# Patient Record
Sex: MALE | Race: WHITE | NOT HISPANIC OR LATINO | Employment: FULL TIME | URBAN - METROPOLITAN AREA
[De-identification: names, ages, dates, MRNs, and addresses within clinical notes are randomized per-mention and may not be internally consistent; named-entity substitution may affect disease eponyms.]

---

## 2017-03-13 ENCOUNTER — HISTORICAL (OUTPATIENT)
Dept: LAB | Facility: HOSPITAL | Age: 52
End: 2017-03-13

## 2017-09-05 ENCOUNTER — HISTORICAL (OUTPATIENT)
Dept: LAB | Facility: HOSPITAL | Age: 52
End: 2017-09-05

## 2017-09-05 LAB
ABS NEUT (OLG): 5.89
BASOPHILS # BLD AUTO: 0.04 X10(3)/MCL
BASOPHILS NFR BLD AUTO: 0.5 %
EOSINOPHIL # BLD AUTO: 0.07 X10(3)/MCL
EOSINOPHIL NFR BLD AUTO: 0.8 %
ERYTHROCYTE [DISTWIDTH] IN BLOOD BY AUTOMATED COUNT: 16 %
HCT VFR BLD AUTO: 41.9 % (ref 39–49)
HGB BLD-MCNC: 13.3 GM/DL (ref 12.6–16.6)
IMM GRANULOCYTES # BLD AUTO: 0.04 10*3/UL (ref 0–0.1)
IMM GRANULOCYTES NFR BLD AUTO: 0.5 % (ref 0–1)
LYMPHOCYTES # BLD AUTO: 1.62 X10(3)/MCL
LYMPHOCYTES NFR BLD AUTO: 19.3 %
MCH RBC QN AUTO: 24.7 PG (ref 27–33)
MCHC RBC AUTO-ENTMCNC: 31.7 GM/DL (ref 32–35)
MCV RBC AUTO: 77.7 FL (ref 84–97)
MONOCYTES # BLD AUTO: 0.73 X10(3)/MCL
MONOCYTES NFR BLD AUTO: 8.7 %
NEUTROPHILS # BLD AUTO: 5.89 X10(3)/MCL
NEUTROPHILS NFR BLD AUTO: 70.2 %
PLATELET # BLD AUTO: 228 X10(3)/MCL (ref 151–368)
PMV BLD AUTO: 10 FL
PSA SERPL-MCNC: 1.97 NG/ML (ref 0–4)
RBC # BLD AUTO: 5.39 X10(6)/MCL (ref 4.3–5.6)
TESTOST SERPL-MCNC: 3194.5 NG/DL (ref 241–827)
WBC # SPEC AUTO: 8.39 X10(3)/MCL (ref 3.4–9.2)

## 2017-09-26 ENCOUNTER — HISTORICAL (OUTPATIENT)
Dept: LAB | Facility: HOSPITAL | Age: 52
End: 2017-09-26

## 2017-09-26 LAB — TESTOST SERPL-MCNC: 127 NG/DL (ref 241–827)

## 2018-01-22 ENCOUNTER — HISTORICAL (OUTPATIENT)
Dept: PREADMISSION TESTING | Facility: HOSPITAL | Age: 53
End: 2018-01-22

## 2018-03-15 ENCOUNTER — HISTORICAL (OUTPATIENT)
Dept: LAB | Facility: HOSPITAL | Age: 53
End: 2018-03-15

## 2018-03-15 LAB
EST. AVERAGE GLUCOSE BLD GHB EST-MCNC: 114 MG/DL
HBA1C MFR BLD: 5.6 % (ref 4.5–6.2)
HCT VFR BLD AUTO: 41.8 % (ref 39–49)
HGB BLD-MCNC: 13.3 GM/DL (ref 12.6–16.6)
PSA SERPL-MCNC: 2.47 NG/ML (ref 0–4)
TESTOST SERPL-MCNC: 1275.1 NG/DL (ref 241–827)

## 2018-05-14 ENCOUNTER — CLINICAL SUPPORT (OUTPATIENT)
Dept: INTERNAL MEDICINE | Facility: CLINIC | Age: 53
End: 2018-05-14

## 2018-05-14 VITALS — HEIGHT: 70 IN

## 2018-05-14 DIAGNOSIS — Z00.00 PHYSICAL EXAM: Primary | ICD-10-CM

## 2018-05-14 PROCEDURE — 99201 PR OFFICE/OUTPT VISIT,NEW,LEVL I: CPT | Mod: ,,, | Performed by: INTERNAL MEDICINE

## 2018-05-14 NOTE — PROGRESS NOTES
Rinku Herzog has presented today on behalf of [employer]. Rinku has completed a Routine or Annual (5 year) Physical Exam. Rinku has also completed Non-DOT Physical.     Yuan Davis

## 2018-10-22 ENCOUNTER — HISTORICAL (OUTPATIENT)
Dept: LAB | Facility: HOSPITAL | Age: 53
End: 2018-10-22

## 2018-10-22 LAB
ABS NEUT (OLG): 4.52
ALBUMIN SERPL-MCNC: 3.9 GM/DL (ref 3.4–5)
ALBUMIN/GLOB SERPL: 1.2 RATIO (ref 1.1–2)
ALP SERPL-CCNC: 56 UNIT/L (ref 46–116)
ALT SERPL-CCNC: 57 UNIT/L (ref 12–78)
AST SERPL-CCNC: 25 UNIT/L (ref 10–37)
BASOPHILS # BLD AUTO: 0.04 X10(3)/MCL
BASOPHILS NFR BLD AUTO: 0.6 %
BILIRUB SERPL-MCNC: 0.7 MG/DL (ref 0.2–1)
BILIRUBIN DIRECT+TOT PNL SERPL-MCNC: 0.15 MG/DL (ref 0–0.2)
BILIRUBIN DIRECT+TOT PNL SERPL-MCNC: 0.55 MG/DL
BUN SERPL-MCNC: 20 MG/DL (ref 7–18)
CALCIUM SERPL-MCNC: 8.8 MG/DL (ref 8.5–10.1)
CHLORIDE SERPL-SCNC: 103 MMOL/L (ref 98–107)
CHOLEST SERPL-MCNC: 163 MG/DL (ref 50–200)
CHOLEST/HDLC SERPL: 6 {RATIO} (ref 0–5)
CO2 SERPL-SCNC: 25.5 MMOL/L (ref 21–32)
CREAT SERPL-MCNC: 1.13 MG/DL (ref 0.7–1.3)
EOSINOPHIL # BLD AUTO: 0.11 X10(3)/MCL
EOSINOPHIL NFR BLD AUTO: 1.6 %
ERYTHROCYTE [DISTWIDTH] IN BLOOD BY AUTOMATED COUNT: 17 %
GLOBULIN SER-MCNC: 3.2 GM/DL (ref 2.4–3.5)
GLUCOSE SERPL-MCNC: 99 MG/DL (ref 74–106)
HCT VFR BLD AUTO: 45.9 % (ref 39–49)
HDLC SERPL-MCNC: 28 MG/DL (ref 35–60)
HGB BLD-MCNC: 14.3 GM/DL (ref 12.6–16.6)
IMM GRANULOCYTES # BLD AUTO: 0.06 10*3/UL (ref 0–0.1)
IMM GRANULOCYTES NFR BLD AUTO: 0.9 % (ref 0–1)
LDLC SERPL CALC-MCNC: 97 MG/DL (ref 50–140)
LYMPHOCYTES # BLD AUTO: 1.55 X10(3)/MCL
LYMPHOCYTES NFR BLD AUTO: 22.3 %
MCH RBC QN AUTO: 24.2 PG (ref 27–33)
MCHC RBC AUTO-ENTMCNC: 31.2 GM/DL (ref 32–35)
MCV RBC AUTO: 77.8 FL (ref 84–97)
MONOCYTES # BLD AUTO: 0.68 X10(3)/MCL
MONOCYTES NFR BLD AUTO: 9.8 %
NEUTROPHILS # BLD AUTO: 4.52 X10(3)/MCL
NEUTROPHILS NFR BLD AUTO: 64.8 %
PLATELET # BLD AUTO: 214 X10(3)/MCL (ref 151–368)
PMV BLD AUTO: 10 FL
POTASSIUM SERPL-SCNC: 3.9 MMOL/L (ref 3.5–5.1)
PROT SERPL-MCNC: 7.1 GM/DL (ref 6.4–8.2)
PSA SERPL-MCNC: 2.55 NG/ML (ref 0–4)
RBC # BLD AUTO: 5.9 X10(6)/MCL (ref 4.3–5.6)
SODIUM SERPL-SCNC: 139 MMOL/L (ref 136–145)
TESTOST SERPL-MCNC: 772 NG/DL (ref 241–827)
TRIGL SERPL-MCNC: 190 MG/DL (ref 30–150)
VLDLC SERPL CALC-MCNC: 38 MG/DL
WBC # SPEC AUTO: 6.96 X10(3)/MCL (ref 3.4–9.2)

## 2019-02-08 ENCOUNTER — HISTORICAL (OUTPATIENT)
Dept: LAB | Facility: HOSPITAL | Age: 54
End: 2019-02-08

## 2019-02-08 LAB
ABS NEUT (OLG): 4.52
ALBUMIN SERPL-MCNC: 3.7 GM/DL (ref 3.4–5)
ALBUMIN/GLOB SERPL: 1.2 RATIO (ref 1.1–2)
ALP SERPL-CCNC: 60 UNIT/L (ref 46–116)
ALT SERPL-CCNC: 52 UNIT/L (ref 12–78)
APTT PPP: 24.9 SECOND(S) (ref 24.5–32.8)
AST SERPL-CCNC: 22 UNIT/L (ref 10–37)
BASOPHILS # BLD AUTO: 0.02 X10(3)/MCL
BASOPHILS NFR BLD AUTO: 0.3 %
BILIRUB SERPL-MCNC: 0.6 MG/DL (ref 0.2–1)
BILIRUBIN DIRECT+TOT PNL SERPL-MCNC: 0.16 MG/DL (ref 0–0.2)
BILIRUBIN DIRECT+TOT PNL SERPL-MCNC: 0.44 MG/DL
BUN SERPL-MCNC: 14 MG/DL (ref 7–18)
CALCIUM SERPL-MCNC: 8.9 MG/DL (ref 8.5–10.1)
CHLORIDE SERPL-SCNC: 107 MMOL/L (ref 98–107)
CO2 SERPL-SCNC: 30.3 MMOL/L (ref 21–32)
CREAT SERPL-MCNC: 1.25 MG/DL (ref 0.7–1.3)
EOSINOPHIL # BLD AUTO: 0.07 X10(3)/MCL
EOSINOPHIL NFR BLD AUTO: 1 %
ERYTHROCYTE [DISTWIDTH] IN BLOOD BY AUTOMATED COUNT: 19 %
GLOBULIN SER-MCNC: 3.1 GM/DL (ref 2.4–3.5)
GLUCOSE SERPL-MCNC: 100 MG/DL (ref 74–106)
HCT VFR BLD AUTO: 48.9 % (ref 39–49)
HGB BLD-MCNC: 15.2 GM/DL (ref 12.6–16.6)
IMM GRANULOCYTES # BLD AUTO: 0.02 10*3/UL (ref 0–0.1)
IMM GRANULOCYTES NFR BLD AUTO: 0.3 % (ref 0–1)
INR PPP: 1.1
LYMPHOCYTES # BLD AUTO: 1.57 X10(3)/MCL
LYMPHOCYTES NFR BLD AUTO: 23.4 %
MCH RBC QN AUTO: 25.6 PG (ref 27–33)
MCHC RBC AUTO-ENTMCNC: 31.1 GM/DL (ref 32–35)
MCV RBC AUTO: 82.5 FL (ref 84–97)
MONOCYTES # BLD AUTO: 0.5 X10(3)/MCL
MONOCYTES NFR BLD AUTO: 7.5 %
NEUTROPHILS # BLD AUTO: 4.52 X10(3)/MCL
NEUTROPHILS NFR BLD AUTO: 67.5 %
PLATELET # BLD AUTO: 214 X10(3)/MCL (ref 151–368)
PMV BLD AUTO: 10 FL
POTASSIUM SERPL-SCNC: 4.1 MMOL/L (ref 3.5–5.1)
PROT SERPL-MCNC: 6.8 GM/DL (ref 6.4–8.2)
PROTHROMBIN TIME: 10.3 SECOND(S) (ref 8.6–10.1)
RBC # BLD AUTO: 5.93 X10(6)/MCL (ref 4.3–5.6)
SODIUM SERPL-SCNC: 144 MMOL/L (ref 136–145)
WBC # SPEC AUTO: 6.7 X10(3)/MCL (ref 3.4–9.2)

## 2019-02-12 ENCOUNTER — HISTORICAL (OUTPATIENT)
Dept: LAB | Facility: HOSPITAL | Age: 54
End: 2019-02-12

## 2019-02-13 ENCOUNTER — HISTORICAL (OUTPATIENT)
Dept: MEDSURG UNIT | Facility: HOSPITAL | Age: 54
End: 2019-02-13

## 2019-03-12 ENCOUNTER — HISTORICAL (OUTPATIENT)
Dept: RADIOLOGY | Facility: HOSPITAL | Age: 54
End: 2019-03-12

## 2019-04-02 ENCOUNTER — HISTORICAL (OUTPATIENT)
Dept: LAB | Facility: HOSPITAL | Age: 54
End: 2019-04-02

## 2019-04-02 LAB
BUN SERPL-MCNC: 21 MG/DL (ref 7–18)
CALCIUM SERPL-MCNC: 8.4 MG/DL (ref 8.5–10.1)
CHLORIDE SERPL-SCNC: 105 MMOL/L (ref 98–107)
CO2 SERPL-SCNC: 28.6 MMOL/L (ref 21–32)
CREAT SERPL-MCNC: 1.45 MG/DL (ref 0.7–1.3)
CREAT/UREA NIT SERPL: 14
GLUCOSE SERPL-MCNC: 88 MG/DL (ref 74–106)
POTASSIUM SERPL-SCNC: 4 MMOL/L (ref 3.5–5.1)
SODIUM SERPL-SCNC: 140 MMOL/L (ref 136–145)

## 2019-04-24 ENCOUNTER — HISTORICAL (OUTPATIENT)
Dept: LAB | Facility: HOSPITAL | Age: 54
End: 2019-04-24

## 2019-04-24 LAB
ABS NEUT (OLG): 5.07 X10(3)/MCL (ref 2.1–9.2)
ALBUMIN SERPL-MCNC: 4 GM/DL (ref 3.4–5)
ALBUMIN/GLOB SERPL: 1.3 {RATIO}
ALP SERPL-CCNC: 75 UNIT/L (ref 50–136)
ALT SERPL-CCNC: 65 UNIT/L (ref 12–78)
AST SERPL-CCNC: 26 UNIT/L (ref 15–37)
BASOPHILS # BLD AUTO: 0 X10(3)/MCL (ref 0–0.2)
BASOPHILS NFR BLD AUTO: 0 %
BILIRUB SERPL-MCNC: 0.9 MG/DL (ref 0.2–1)
BILIRUBIN DIRECT+TOT PNL SERPL-MCNC: 0.2 MG/DL (ref 0–0.2)
BILIRUBIN DIRECT+TOT PNL SERPL-MCNC: 0.7 MG/DL (ref 0–0.8)
BUN SERPL-MCNC: 17 MG/DL (ref 7–18)
CALCIUM SERPL-MCNC: 8.6 MG/DL (ref 8.5–10.1)
CHLORIDE SERPL-SCNC: 104 MMOL/L (ref 98–107)
CO2 SERPL-SCNC: 27 MMOL/L (ref 21–32)
CREAT SERPL-MCNC: 1.32 MG/DL (ref 0.7–1.3)
EOSINOPHIL # BLD AUTO: 0.1 X10(3)/MCL (ref 0–0.9)
EOSINOPHIL NFR BLD AUTO: 1 %
ERYTHROCYTE [DISTWIDTH] IN BLOOD BY AUTOMATED COUNT: 16.4 % (ref 11.5–17)
GLOBULIN SER-MCNC: 3 GM/DL (ref 2.4–3.5)
GLUCOSE SERPL-MCNC: 85 MG/DL (ref 74–106)
HCT VFR BLD AUTO: 52.1 % (ref 42–52)
HGB BLD-MCNC: 16 GM/DL (ref 14–18)
LYMPHOCYTES # BLD AUTO: 1.3 X10(3)/MCL (ref 0.6–4.6)
LYMPHOCYTES NFR BLD AUTO: 18 %
MCH RBC QN AUTO: 26.2 PG (ref 27–31)
MCHC RBC AUTO-ENTMCNC: 30.7 GM/DL (ref 33–36)
MCV RBC AUTO: 85.4 FL (ref 80–94)
MONOCYTES # BLD AUTO: 0.6 X10(3)/MCL (ref 0.1–1.3)
MONOCYTES NFR BLD AUTO: 8 %
NEUTROPHILS # BLD AUTO: 5.07 X10(3)/MCL (ref 2.1–9.2)
NEUTROPHILS NFR BLD AUTO: 72 %
PLATELET # BLD AUTO: 199 X10(3)/MCL (ref 130–400)
PMV BLD AUTO: 10.6 FL (ref 9.4–12.4)
POTASSIUM SERPL-SCNC: 4 MMOL/L (ref 3.5–5.1)
PROT SERPL-MCNC: 7 GM/DL (ref 6.4–8.2)
RBC # BLD AUTO: 6.1 X10(6)/MCL (ref 4.7–6.1)
SODIUM SERPL-SCNC: 137 MMOL/L (ref 136–145)
TSH SERPL-ACNC: 2.61 MIU/L (ref 0.36–3.74)
WBC # SPEC AUTO: 7.1 X10(3)/MCL (ref 4.5–11.5)

## 2019-07-05 ENCOUNTER — HISTORICAL (OUTPATIENT)
Dept: LAB | Facility: HOSPITAL | Age: 54
End: 2019-07-05

## 2019-07-05 LAB
ABS NEUT (OLG): 3.48
ALBUMIN SERPL-MCNC: 3.7 GM/DL (ref 3.4–5)
ALBUMIN/GLOB SERPL: 1.2 RATIO (ref 1.1–2)
ALP SERPL-CCNC: 73 UNIT/L (ref 46–116)
ALT SERPL-CCNC: 47 UNIT/L (ref 12–78)
AST SERPL-CCNC: 16 UNIT/L (ref 10–37)
BASOPHILS # BLD AUTO: 0.01 X10(3)/MCL
BASOPHILS NFR BLD AUTO: 0.2 %
BILIRUB SERPL-MCNC: 0.4 MG/DL (ref 0.2–1)
BILIRUBIN DIRECT+TOT PNL SERPL-MCNC: 0.11 MG/DL (ref 0–0.2)
BILIRUBIN DIRECT+TOT PNL SERPL-MCNC: 0.29 MG/DL
BUN SERPL-MCNC: 19 MG/DL (ref 7–18)
CALCIUM SERPL-MCNC: 8.7 MG/DL (ref 8.5–10.1)
CHLORIDE SERPL-SCNC: 106 MMOL/L (ref 98–107)
CO2 SERPL-SCNC: 26 MMOL/L (ref 21–32)
CREAT SERPL-MCNC: 1.15 MG/DL (ref 0.7–1.3)
EOSINOPHIL # BLD AUTO: 0.12 X10(3)/MCL
EOSINOPHIL NFR BLD AUTO: 2.1 %
ERYTHROCYTE [DISTWIDTH] IN BLOOD BY AUTOMATED COUNT: 16 %
EST. AVERAGE GLUCOSE BLD GHB EST-MCNC: 114 MG/DL
GLOBULIN SER-MCNC: 3 GM/DL (ref 2.4–3.5)
GLUCOSE SERPL-MCNC: 97 MG/DL (ref 74–106)
HBA1C MFR BLD: 5.6 % (ref 4.5–6.2)
HCT VFR BLD AUTO: 45.1 % (ref 39–49)
HGB BLD-MCNC: 15.2 GM/DL (ref 12.6–16.6)
IMM GRANULOCYTES # BLD AUTO: 0.06 10*3/UL (ref 0–0.1)
IMM GRANULOCYTES NFR BLD AUTO: 1 % (ref 0–1)
LYMPHOCYTES # BLD AUTO: 1.51 X10(3)/MCL
LYMPHOCYTES NFR BLD AUTO: 26.4 %
MCH RBC QN AUTO: 29.2 PG (ref 27–33)
MCHC RBC AUTO-ENTMCNC: 33.7 GM/DL (ref 32–35)
MCV RBC AUTO: 86.6 FL (ref 84–97)
MONOCYTES # BLD AUTO: 0.55 X10(3)/MCL
MONOCYTES NFR BLD AUTO: 9.6 %
NEUTROPHILS # BLD AUTO: 3.48 X10(3)/MCL
NEUTROPHILS NFR BLD AUTO: 60.7 %
PLATELET # BLD AUTO: 161 X10(3)/MCL (ref 140–450)
PMV BLD AUTO: 10 FL
POTASSIUM SERPL-SCNC: 3.9 MMOL/L (ref 3.5–5.1)
PROT SERPL-MCNC: 6.7 GM/DL (ref 6.4–8.2)
PSA SERPL-MCNC: 2.24 NG/ML (ref 0–4)
RBC # BLD AUTO: 5.21 X10(6)/MCL (ref 4.3–5.6)
SODIUM SERPL-SCNC: 142 MMOL/L (ref 136–145)
TESTOST SERPL-MCNC: 372 NG/DL (ref 241–827)
WBC # SPEC AUTO: 5.73 X10(3)/MCL (ref 3.4–9.2)

## 2019-08-02 ENCOUNTER — HISTORICAL (OUTPATIENT)
Dept: LAB | Facility: HOSPITAL | Age: 54
End: 2019-08-02

## 2019-08-02 LAB
ABS NEUT (OLG): 5.19
ALBUMIN SERPL-MCNC: 4.1 GM/DL (ref 3.4–5)
ALBUMIN/GLOB SERPL: 1.2 RATIO (ref 1.1–2)
ALP SERPL-CCNC: 77 UNIT/L (ref 46–116)
ALT SERPL-CCNC: 35 UNIT/L (ref 12–78)
AST SERPL-CCNC: 18 UNIT/L (ref 10–37)
BASOPHILS # BLD AUTO: 0.02 X10(3)/MCL
BASOPHILS NFR BLD AUTO: 0.3 %
BILIRUB SERPL-MCNC: 0.7 MG/DL (ref 0.2–1)
BILIRUBIN DIRECT+TOT PNL SERPL-MCNC: 0.19 MG/DL (ref 0–0.2)
BILIRUBIN DIRECT+TOT PNL SERPL-MCNC: 0.51 MG/DL
BUN SERPL-MCNC: 11 MG/DL (ref 7–18)
CALCIUM SERPL-MCNC: 9.3 MG/DL (ref 8.5–10.1)
CHLORIDE SERPL-SCNC: 103 MMOL/L (ref 98–107)
CO2 SERPL-SCNC: 28.3 MMOL/L (ref 21–32)
CREAT SERPL-MCNC: 1.12 MG/DL (ref 0.7–1.3)
EOSINOPHIL # BLD AUTO: 0.04 X10(3)/MCL
EOSINOPHIL NFR BLD AUTO: 0.5 %
ERYTHROCYTE [DISTWIDTH] IN BLOOD BY AUTOMATED COUNT: 15 %
GLOBULIN SER-MCNC: 3.3 GM/DL (ref 2.4–3.5)
GLUCOSE SERPL-MCNC: 100 MG/DL (ref 74–106)
HCT VFR BLD AUTO: 47 % (ref 39–49)
HGB BLD-MCNC: 16.7 GM/DL (ref 12.6–16.6)
IMM GRANULOCYTES # BLD AUTO: 0.03 10*3/UL (ref 0–0.1)
IMM GRANULOCYTES NFR BLD AUTO: 0.4 % (ref 0–1)
LYMPHOCYTES # BLD AUTO: 1.81 X10(3)/MCL
LYMPHOCYTES NFR BLD AUTO: 22.9 %
MCH RBC QN AUTO: 30.5 PG (ref 27–33)
MCHC RBC AUTO-ENTMCNC: 35.5 GM/DL (ref 32–35)
MCV RBC AUTO: 85.8 FL (ref 84–97)
MONOCYTES # BLD AUTO: 0.82 X10(3)/MCL
MONOCYTES NFR BLD AUTO: 10.4 %
NEUTROPHILS # BLD AUTO: 5.19 X10(3)/MCL
NEUTROPHILS NFR BLD AUTO: 65.5 %
PLATELET # BLD AUTO: 208 X10(3)/MCL (ref 140–450)
PMV BLD AUTO: 10 FL
POTASSIUM SERPL-SCNC: 3.8 MMOL/L (ref 3.5–5.1)
PROT SERPL-MCNC: 7.4 GM/DL (ref 6.4–8.2)
RBC # BLD AUTO: 5.48 X10(6)/MCL (ref 4.3–5.6)
SODIUM SERPL-SCNC: 139 MMOL/L (ref 136–145)
WBC # SPEC AUTO: 7.91 X10(3)/MCL (ref 3.4–9.2)

## 2019-08-20 ENCOUNTER — HISTORICAL (OUTPATIENT)
Dept: OCCUPATIONAL THERAPY | Facility: HOSPITAL | Age: 54
End: 2019-08-20

## 2019-08-21 ENCOUNTER — HISTORICAL (OUTPATIENT)
Dept: LAB | Facility: HOSPITAL | Age: 54
End: 2019-08-21

## 2019-08-21 LAB
T4 FREE SERPL-MCNC: 1.11 NG/DL (ref 0.76–1.46)
TSH SERPL-ACNC: 2.33 MIU/ML (ref 0.35–3.75)

## 2019-08-22 ENCOUNTER — HISTORICAL (OUTPATIENT)
Dept: INTENSIVE CARE | Facility: HOSPITAL | Age: 54
End: 2019-08-22

## 2019-08-23 ENCOUNTER — HISTORICAL (OUTPATIENT)
Dept: OCCUPATIONAL THERAPY | Facility: HOSPITAL | Age: 54
End: 2019-08-23

## 2019-08-26 ENCOUNTER — HISTORICAL (OUTPATIENT)
Dept: OCCUPATIONAL THERAPY | Facility: HOSPITAL | Age: 54
End: 2019-08-26

## 2019-08-28 ENCOUNTER — HISTORICAL (OUTPATIENT)
Dept: OCCUPATIONAL THERAPY | Facility: HOSPITAL | Age: 54
End: 2019-08-28

## 2019-09-03 ENCOUNTER — HISTORICAL (OUTPATIENT)
Dept: OCCUPATIONAL THERAPY | Facility: HOSPITAL | Age: 54
End: 2019-09-03

## 2019-09-06 ENCOUNTER — HISTORICAL (OUTPATIENT)
Dept: OCCUPATIONAL THERAPY | Facility: HOSPITAL | Age: 54
End: 2019-09-06

## 2019-09-09 ENCOUNTER — HISTORICAL (OUTPATIENT)
Dept: OCCUPATIONAL THERAPY | Facility: HOSPITAL | Age: 54
End: 2019-09-09

## 2019-09-11 ENCOUNTER — HISTORICAL (OUTPATIENT)
Dept: OCCUPATIONAL THERAPY | Facility: HOSPITAL | Age: 54
End: 2019-09-11

## 2019-09-13 ENCOUNTER — HISTORICAL (OUTPATIENT)
Dept: OCCUPATIONAL THERAPY | Facility: HOSPITAL | Age: 54
End: 2019-09-13

## 2019-09-18 ENCOUNTER — HISTORICAL (OUTPATIENT)
Dept: OCCUPATIONAL THERAPY | Facility: HOSPITAL | Age: 54
End: 2019-09-18

## 2019-10-03 ENCOUNTER — HISTORICAL (OUTPATIENT)
Dept: OCCUPATIONAL THERAPY | Facility: HOSPITAL | Age: 54
End: 2019-10-03

## 2019-10-09 ENCOUNTER — HISTORICAL (OUTPATIENT)
Dept: OCCUPATIONAL THERAPY | Facility: HOSPITAL | Age: 54
End: 2019-10-09

## 2019-10-30 ENCOUNTER — HISTORICAL (OUTPATIENT)
Dept: OCCUPATIONAL THERAPY | Facility: HOSPITAL | Age: 54
End: 2019-10-30

## 2019-11-05 ENCOUNTER — HISTORICAL (OUTPATIENT)
Dept: OCCUPATIONAL THERAPY | Facility: HOSPITAL | Age: 54
End: 2019-11-05

## 2019-11-07 ENCOUNTER — HISTORICAL (OUTPATIENT)
Dept: OCCUPATIONAL THERAPY | Facility: HOSPITAL | Age: 54
End: 2019-11-07

## 2019-11-13 ENCOUNTER — HISTORICAL (OUTPATIENT)
Dept: OCCUPATIONAL THERAPY | Facility: HOSPITAL | Age: 54
End: 2019-11-13

## 2019-11-21 ENCOUNTER — HISTORICAL (OUTPATIENT)
Dept: OCCUPATIONAL THERAPY | Facility: HOSPITAL | Age: 54
End: 2019-11-21

## 2019-12-02 ENCOUNTER — HISTORICAL (OUTPATIENT)
Dept: OCCUPATIONAL THERAPY | Facility: HOSPITAL | Age: 54
End: 2019-12-02

## 2019-12-04 ENCOUNTER — HISTORICAL (OUTPATIENT)
Dept: OCCUPATIONAL THERAPY | Facility: HOSPITAL | Age: 54
End: 2019-12-04

## 2020-05-21 DIAGNOSIS — Z13.9 ENCOUNTER FOR SCREENING: Primary | ICD-10-CM

## 2021-05-18 ENCOUNTER — HISTORICAL (OUTPATIENT)
Dept: LAB | Facility: HOSPITAL | Age: 56
End: 2021-05-18

## 2021-05-18 LAB
ABS NEUT (OLG): 3.25
ALBUMIN SERPL-MCNC: 4 GM/DL (ref 3.5–5)
ALBUMIN/GLOB SERPL: 1.4 RATIO (ref 1.1–2)
ALP SERPL-CCNC: 68 UNIT/L (ref 40–150)
ALT SERPL-CCNC: 38 UNIT/L (ref 0–55)
AST SERPL-CCNC: 22 UNIT/L (ref 5–34)
BASOPHILS # BLD AUTO: 0.01 X10(3)/MCL
BASOPHILS NFR BLD AUTO: 0.2 %
BILIRUB SERPL-MCNC: 0.6 MG/DL
BILIRUBIN DIRECT+TOT PNL SERPL-MCNC: 0.2 MG/DL (ref 0–0.5)
BILIRUBIN DIRECT+TOT PNL SERPL-MCNC: 0.4 MG/DL
BUN SERPL-MCNC: 20 MG/DL (ref 8.4–25.7)
CALCIUM SERPL-MCNC: 9.2 MG/DL (ref 8.4–10.2)
CHLORIDE SERPL-SCNC: 109 MMOL/L (ref 98–107)
CHOLEST SERPL-MCNC: 151 MG/DL
CHOLEST/HDLC SERPL: 5 {RATIO} (ref 0–5)
CO2 SERPL-SCNC: 26 MEQ/L (ref 22–29)
CREAT SERPL-MCNC: 1.19 MG/DL (ref 0.73–1.18)
EOSINOPHIL # BLD AUTO: 0.08 X10(3)/MCL
EOSINOPHIL NFR BLD AUTO: 1.6 %
ERYTHROCYTE [DISTWIDTH] IN BLOOD BY AUTOMATED COUNT: 13 %
EST. AVERAGE GLUCOSE BLD GHB EST-MCNC: 97 MG/DL
GLOBULIN SER-MCNC: 2.8 GM/DL (ref 2.4–3.5)
GLUCOSE SERPL-MCNC: 104 MG/DL (ref 74–100)
HBA1C MFR BLD: 5 % (ref 4–6)
HCT VFR BLD AUTO: 48.1 % (ref 39–49)
HDLC SERPL-MCNC: 33 MG/DL (ref 35–60)
HGB BLD-MCNC: 15.8 GM/DL (ref 12.6–16.6)
IMM GRANULOCYTES # BLD AUTO: 0.02 10*3/UL (ref 0–0.1)
IMM GRANULOCYTES NFR BLD AUTO: 0.4 % (ref 0–1)
LDLC SERPL CALC-MCNC: 97 MG/DL (ref 50–140)
LYMPHOCYTES # BLD AUTO: 1.39 X10(3)/MCL
LYMPHOCYTES NFR BLD AUTO: 26.9 %
MCH RBC QN AUTO: 30.1 PG (ref 27–33)
MCHC RBC AUTO-ENTMCNC: 32.8 GM/DL (ref 32–35)
MCV RBC AUTO: 91.6 FL (ref 84–97)
MONOCYTES # BLD AUTO: 0.41 X10(3)/MCL
MONOCYTES NFR BLD AUTO: 7.9 %
NEUTROPHILS # BLD AUTO: 3.25 X10(3)/MCL
NEUTROPHILS NFR BLD AUTO: 63 %
PLATELET # BLD AUTO: 166 X10(3)/MCL (ref 140–450)
PMV BLD AUTO: 10 FL
POTASSIUM SERPL-SCNC: 4.2 MMOL/L (ref 3.5–5.1)
PROT SERPL-MCNC: 6.8 GM/DL (ref 6.4–8.3)
PSA SERPL-MCNC: 2.33 NG/ML
RBC # BLD AUTO: 5.25 X10(6)/MCL (ref 4.3–5.6)
SODIUM SERPL-SCNC: 144 MMOL/L (ref 136–145)
TESTOST SERPL-MCNC: 377.76 NG/DL (ref 220.91–715.81)
TRIGL SERPL-MCNC: 105 MG/DL (ref 34–140)
TSH SERPL-ACNC: 1.97 UIU/ML (ref 0.35–4.94)
VLDLC SERPL CALC-MCNC: 21 MG/DL
WBC # SPEC AUTO: 5.16 X10(3)/MCL (ref 3.4–9.2)

## 2021-08-02 ENCOUNTER — HISTORICAL (OUTPATIENT)
Dept: LAB | Facility: HOSPITAL | Age: 56
End: 2021-08-02

## 2021-08-02 LAB — SARS-COV-2 AG RESP QL IA.RAPID: NEGATIVE

## 2021-10-21 ENCOUNTER — HISTORICAL (OUTPATIENT)
Dept: RADIOLOGY | Facility: HOSPITAL | Age: 56
End: 2021-10-21

## 2021-11-26 ENCOUNTER — HISTORICAL (OUTPATIENT)
Dept: LAB | Facility: HOSPITAL | Age: 56
End: 2021-11-26

## 2021-11-26 LAB — SARS-COV-2 RNA RESP QL NAA+PROBE: NOT DETECTED

## 2021-12-29 ENCOUNTER — HISTORICAL (OUTPATIENT)
Dept: LAB | Facility: HOSPITAL | Age: 56
End: 2021-12-29

## 2021-12-29 LAB
FLUAV AG UPPER RESP QL IA.RAPID: NEGATIVE
FLUBV AG UPPER RESP QL IA.RAPID: NEGATIVE
SARS-COV-2 RNA RESP QL NAA+PROBE: DETECTED

## 2021-12-30 ENCOUNTER — HISTORICAL (OUTPATIENT)
Dept: RADIOLOGY | Facility: HOSPITAL | Age: 56
End: 2021-12-30

## 2022-01-05 ENCOUNTER — HISTORICAL (OUTPATIENT)
Dept: RADIOLOGY | Facility: HOSPITAL | Age: 57
End: 2022-01-05

## 2022-01-06 ENCOUNTER — HISTORICAL (OUTPATIENT)
Dept: ADMINISTRATIVE | Facility: HOSPITAL | Age: 57
End: 2022-01-06

## 2022-01-10 ENCOUNTER — HISTORICAL (OUTPATIENT)
Dept: LAB | Facility: HOSPITAL | Age: 57
End: 2022-01-10

## 2022-01-10 LAB
ABS NEUT (OLG): 6.17
ALBUMIN SERPL-MCNC: 3.5 GM/DL (ref 3.5–5)
ALBUMIN/GLOB SERPL: 1 RATIO (ref 1.1–2)
ALP SERPL-CCNC: 78 UNIT/L (ref 40–150)
ALT SERPL-CCNC: 70 UNIT/L (ref 0–55)
AST SERPL-CCNC: 30 UNIT/L (ref 5–34)
BASOPHILS # BLD AUTO: 0.02 X10(3)/MCL
BASOPHILS NFR BLD AUTO: 0.3 %
BILIRUB SERPL-MCNC: 0.9 MG/DL
BILIRUBIN DIRECT+TOT PNL SERPL-MCNC: 0.4 MG/DL (ref 0–0.5)
BILIRUBIN DIRECT+TOT PNL SERPL-MCNC: 0.5 MG/DL
BUN SERPL-MCNC: 12 MG/DL (ref 8.4–25.7)
CALCIUM SERPL-MCNC: 8.7 MG/DL (ref 8.7–10.5)
CHLORIDE SERPL-SCNC: 108 MMOL/L (ref 98–107)
CO2 SERPL-SCNC: 24 MEQ/L (ref 22–29)
CREAT SERPL-MCNC: 1.04 MG/DL (ref 0.73–1.18)
D DIMER PPP IA.FEU-MCNC: 0.45 MCG/ML FEU (ref 0–0.5)
EOSINOPHIL # BLD AUTO: 0.14 X10(3)/MCL
EOSINOPHIL NFR BLD AUTO: 1.8 %
ERYTHROCYTE [DISTWIDTH] IN BLOOD BY AUTOMATED COUNT: 13 %
GLOBULIN SER-MCNC: 3.4 GM/DL (ref 2.4–3.5)
GLUCOSE SERPL-MCNC: 100 MG/DL (ref 74–100)
HCT VFR BLD AUTO: 45.2 % (ref 39–49)
HGB BLD-MCNC: 15.3 GM/DL (ref 12.6–16.6)
IMM GRANULOCYTES # BLD AUTO: 0.04 10*3/UL (ref 0–0.1)
IMM GRANULOCYTES NFR BLD AUTO: 0.5 % (ref 0–1)
LYMPHOCYTES # BLD AUTO: 0.79 X10(3)/MCL
LYMPHOCYTES NFR BLD AUTO: 10 %
MCH RBC QN AUTO: 31.2 PG (ref 27–33)
MCHC RBC AUTO-ENTMCNC: 33.8 GM/DL (ref 32–35)
MCV RBC AUTO: 92.2 FL (ref 84–97)
MONOCYTES # BLD AUTO: 0.76 X10(3)/MCL
MONOCYTES NFR BLD AUTO: 9.6 %
NEUTROPHILS # BLD AUTO: 6.17 X10(3)/MCL
NEUTROPHILS NFR BLD AUTO: 77.8 %
PLATELET # BLD AUTO: 169 X10(3)/MCL (ref 140–450)
PMV BLD AUTO: 10 FL
POTASSIUM SERPL-SCNC: 4.2 MMOL/L (ref 3.5–5.1)
PROT SERPL-MCNC: 6.9 GM/DL (ref 6.4–8.3)
RBC # BLD AUTO: 4.9 X10(6)/MCL (ref 4.3–5.6)
SODIUM SERPL-SCNC: 141 MMOL/L (ref 136–145)
WBC # SPEC AUTO: 7.92 X10(3)/MCL (ref 3.4–9.2)

## 2022-01-18 ENCOUNTER — HISTORICAL (OUTPATIENT)
Dept: RADIOLOGY | Facility: HOSPITAL | Age: 57
End: 2022-01-18

## 2022-01-28 ENCOUNTER — HISTORICAL (OUTPATIENT)
Dept: ADMINISTRATIVE | Facility: HOSPITAL | Age: 57
End: 2022-01-28

## 2022-01-28 ENCOUNTER — HISTORICAL (OUTPATIENT)
Dept: RADIOLOGY | Facility: HOSPITAL | Age: 57
End: 2022-01-28

## 2022-02-21 ENCOUNTER — HISTORICAL (OUTPATIENT)
Dept: RADIOLOGY | Facility: HOSPITAL | Age: 57
End: 2022-02-21

## 2022-02-21 ENCOUNTER — HISTORICAL (OUTPATIENT)
Dept: ADMINISTRATIVE | Facility: HOSPITAL | Age: 57
End: 2022-02-21

## 2022-03-25 ENCOUNTER — HISTORICAL (OUTPATIENT)
Dept: ADMINISTRATIVE | Facility: HOSPITAL | Age: 57
End: 2022-03-25

## 2022-03-25 ENCOUNTER — HISTORICAL (OUTPATIENT)
Dept: RADIOLOGY | Facility: HOSPITAL | Age: 57
End: 2022-03-25

## 2022-04-10 ENCOUNTER — HISTORICAL (OUTPATIENT)
Dept: ADMINISTRATIVE | Facility: HOSPITAL | Age: 57
End: 2022-04-10
Payer: COMMERCIAL

## 2022-04-25 VITALS
WEIGHT: 243 LBS | HEIGHT: 72 IN | BODY MASS INDEX: 32.91 KG/M2 | DIASTOLIC BLOOD PRESSURE: 80 MMHG | SYSTOLIC BLOOD PRESSURE: 130 MMHG

## 2022-04-30 NOTE — H&P
Patient:   Rinku Herzog            MRN: [id number]            FIN: [id number]               Age:   53 years     Sex:  Male     :  1965   Associated Diagnoses:   None   Author:   Jaylon Mishra MD          Chief Complaint COLONOSCOPY   History of Present Illness colon cancer screening   Review of Systems Constitutional: negative  Respiratory: negative  Cardiovascular: negative  Gastrointestinal: negative, no family hx of colon cancer; hx of prior hemorrhoids, recent flare  Genitourinary: negative  Heme/Lymph: negative  Endocrine: negative  Musculoskeletal: negative  Integumentary: negative  Neurologic: negative  Psych: negative    Physical Exam Vitals & Measurements T: 36.3 °C (Tympanic) HR: 70(Peripheral) RR: 18 BP: 110/80   HT: 182 cm WT: 117 kg BMI: 35.32   General: Alert and oriented, no acute distress, C/C/E  HEENT: Tympanic membranes clear, moist mucosa, no pharyngitis, no sinus tenderness  Respiratory: Lungs are clear to auscultation, breath sounds are equal, symmetrical chest wall expansion, no wheezes/rales/rhonchi  Cardiovascular: Regular rate and rhythm, no murmur, no gallop, no rub  Abdomen: Soft, nontender, normal distention, positive bowel sounds  Integumentary: Warm  Musculoskeletal: Normal range of motion  Neurologic: Alert, oriented  Psychiatric: Cooperative, appropriate mood and affect, normal judgment     Assessment/Plan   1. Colon cancer screening Z12.11   Procedure discussed at length with the patient  Risk and other choices were discussed  Patient agreed to go forward with the procedure  Colon Preparation sheet was given  Prescription for the colon prep was given  Patient gives consent to discuss the results with any family members on the morning of the procedure  Procedure consents were signed  Admit orders were given  Procedure was scheduled for 19    I have examined the patient and there is no change in the patient's condition since the recent history and  physical exam was performed.

## 2022-06-01 ENCOUNTER — LAB VISIT (OUTPATIENT)
Dept: LAB | Facility: HOSPITAL | Age: 57
End: 2022-06-01
Attending: FAMILY MEDICINE
Payer: COMMERCIAL

## 2022-06-01 DIAGNOSIS — I10 HYPERTENSION, ESSENTIAL: Primary | ICD-10-CM

## 2022-06-01 DIAGNOSIS — R42 DIZZINESS AND GIDDINESS: ICD-10-CM

## 2022-06-01 DIAGNOSIS — Z12.5 SPECIAL SCREENING FOR MALIGNANT NEOPLASM OF PROSTATE: ICD-10-CM

## 2022-06-01 LAB
ALBUMIN SERPL-MCNC: 4.1 GM/DL (ref 3.5–5)
ALBUMIN/GLOB SERPL: 1.3 RATIO (ref 1.1–2)
ALP SERPL-CCNC: 89 UNIT/L (ref 40–150)
ALT SERPL-CCNC: 60 UNIT/L (ref 0–55)
AST SERPL-CCNC: 30 UNIT/L (ref 5–34)
BASOPHILS # BLD AUTO: 0.02 X10(3)/MCL (ref 0–0.2)
BASOPHILS NFR BLD AUTO: 0.4 %
BILIRUBIN DIRECT+TOT PNL SERPL-MCNC: 0.9 MG/DL
BUN SERPL-MCNC: 23 MG/DL (ref 8.4–25.7)
CALCIUM SERPL-MCNC: 9.2 MG/DL (ref 8.4–10.2)
CHLORIDE SERPL-SCNC: 106 MMOL/L (ref 98–107)
CHOLEST SERPL-MCNC: 150 MG/DL
CHOLEST/HDLC SERPL: 4 {RATIO} (ref 0–5)
CO2 SERPL-SCNC: 26 MMOL/L (ref 22–29)
CREAT SERPL-MCNC: 1.06 MG/DL (ref 0.73–1.18)
EOSINOPHIL # BLD AUTO: 0.04 X10(3)/MCL (ref 0–0.9)
EOSINOPHIL NFR BLD AUTO: 0.8 %
ERYTHROCYTE [DISTWIDTH] IN BLOOD BY AUTOMATED COUNT: 12.2 % (ref 11.5–17)
GLOBULIN SER-MCNC: 3.1 GM/DL (ref 2.4–3.5)
GLUCOSE SERPL-MCNC: 105 MG/DL (ref 74–100)
HCT VFR BLD AUTO: 47.3 % (ref 42–52)
HDLC SERPL-MCNC: 35 MG/DL (ref 35–60)
HGB BLD-MCNC: 16.2 GM/DL (ref 14–18)
IMM GRANULOCYTES # BLD AUTO: 0.04 X10(3)/MCL (ref 0–0.02)
IMM GRANULOCYTES NFR BLD AUTO: 0.8 % (ref 0–0.43)
LDLC SERPL CALC-MCNC: 79 MG/DL (ref 50–140)
LYMPHOCYTES # BLD AUTO: 1.14 X10(3)/MCL (ref 0.6–4.6)
LYMPHOCYTES NFR BLD AUTO: 21.7 %
MCH RBC QN AUTO: 30.7 PG (ref 27–31)
MCHC RBC AUTO-ENTMCNC: 34.2 MG/DL (ref 33–36)
MCV RBC AUTO: 89.6 FL (ref 80–94)
MONOCYTES # BLD AUTO: 0.34 X10(3)/MCL (ref 0.1–1.3)
MONOCYTES NFR BLD AUTO: 6.5 %
NEUTROPHILS # BLD AUTO: 3.7 X10(3)/MCL (ref 2.1–9.2)
NEUTROPHILS NFR BLD AUTO: 69.8 %
NRBC BLD AUTO-RTO: 0 %
PLATELET # BLD AUTO: 147 X10(3)/MCL (ref 130–400)
PMV BLD AUTO: 10.1 FL (ref 9.4–12.4)
POTASSIUM SERPL-SCNC: 3.8 MMOL/L (ref 3.5–5.1)
PROT SERPL-MCNC: 7.2 GM/DL (ref 6.4–8.3)
PSA SERPL-MCNC: 2.77 NG/ML
RBC # BLD AUTO: 5.28 X10(6)/MCL (ref 4.7–6.1)
SODIUM SERPL-SCNC: 140 MMOL/L (ref 136–145)
TRIGL SERPL-MCNC: 178 MG/DL (ref 34–140)
TSH SERPL-ACNC: 2.34 UIU/ML (ref 0.35–4.94)
VLDLC SERPL CALC-MCNC: 36 MG/DL
WBC # SPEC AUTO: 5.3 X10(3)/MCL (ref 4.5–11.5)

## 2022-06-01 PROCEDURE — 84443 ASSAY THYROID STIM HORMONE: CPT

## 2022-06-01 PROCEDURE — 84153 ASSAY OF PSA TOTAL: CPT

## 2022-06-01 PROCEDURE — 80053 COMPREHEN METABOLIC PANEL: CPT

## 2022-06-01 PROCEDURE — 36415 COLL VENOUS BLD VENIPUNCTURE: CPT

## 2022-06-01 PROCEDURE — 85025 COMPLETE CBC W/AUTO DIFF WBC: CPT

## 2022-06-01 PROCEDURE — 80061 LIPID PANEL: CPT

## 2022-06-03 ENCOUNTER — HOSPITAL ENCOUNTER (OUTPATIENT)
Dept: CARDIOLOGY | Facility: HOSPITAL | Age: 57
Discharge: HOME | End: 2022-06-03
Attending: FAMILY MEDICINE
Payer: COMMERCIAL

## 2022-06-03 DIAGNOSIS — R42 DIZZINESS AND GIDDINESS: ICD-10-CM

## 2022-06-03 DIAGNOSIS — R42 DIZZINESS AND GIDDINESS: Primary | ICD-10-CM

## 2022-06-03 PROCEDURE — 93226 XTRNL ECG REC<48 HR SCAN A/R: CPT

## 2022-06-03 PROCEDURE — 99900031 HC PATIENT EDUCATION (STAT)

## 2022-06-03 PROCEDURE — 99900035 HC TECH TIME PER 15 MIN (STAT)

## 2022-06-06 LAB
OHS CV EVENT MONITOR DAY: 0
OHS CV HOLTER LENGTH DECIMAL HOURS: 24
OHS CV HOLTER LENGTH HOURS: 24
OHS CV HOLTER LENGTH MINUTES: 0
OHS CV HOLTER SINUS AVERAGE HR: 85
OHS CV HOLTER SINUS MAX HR: 131
OHS CV HOLTER SINUS MIN HR: 64

## 2022-06-07 ENCOUNTER — LAB VISIT (OUTPATIENT)
Dept: LAB | Facility: HOSPITAL | Age: 57
End: 2022-06-07
Attending: FAMILY MEDICINE
Payer: COMMERCIAL

## 2022-06-07 DIAGNOSIS — R74.01 NONSPECIFIC ELEVATION OF LEVELS OF TRANSAMINASE OR LACTIC ACID DEHYDROGENASE (LDH): Primary | ICD-10-CM

## 2022-06-07 DIAGNOSIS — R74.02 NONSPECIFIC ELEVATION OF LEVELS OF TRANSAMINASE OR LACTIC ACID DEHYDROGENASE (LDH): Primary | ICD-10-CM

## 2022-06-07 LAB
HAV IGM SERPL QL IA: NONREACTIVE
HBV CORE IGM SERPL QL IA: NONREACTIVE
HBV SURFACE AG SERPL QL IA: NONREACTIVE
HCV AB SERPL QL IA: NONREACTIVE

## 2022-06-07 PROCEDURE — 80074 ACUTE HEPATITIS PANEL: CPT

## 2022-06-07 PROCEDURE — 36415 COLL VENOUS BLD VENIPUNCTURE: CPT

## 2022-06-10 LAB — PATH REV: NORMAL

## 2022-06-24 ENCOUNTER — HOSPITAL ENCOUNTER (OUTPATIENT)
Dept: RADIOLOGY | Facility: HOSPITAL | Age: 57
Discharge: HOME | End: 2022-06-24
Attending: FAMILY MEDICINE
Payer: COMMERCIAL

## 2022-06-24 DIAGNOSIS — R74.01 ELEVATION OF LEVELS OF LIVER TRANSAMINASE LEVELS: ICD-10-CM

## 2022-06-24 PROCEDURE — 76705 ECHO EXAM OF ABDOMEN: CPT | Mod: TC

## 2022-07-05 ENCOUNTER — LAB VISIT (OUTPATIENT)
Dept: LAB | Facility: HOSPITAL | Age: 57
End: 2022-07-05
Attending: FAMILY MEDICINE
Payer: COMMERCIAL

## 2022-07-05 DIAGNOSIS — R74.01 ELEVATION OF LEVELS OF LIVER TRANSAMINASE LEVELS: ICD-10-CM

## 2022-07-05 DIAGNOSIS — E29.1 TESTICULAR HYPOFUNCTION: Primary | ICD-10-CM

## 2022-07-05 LAB
ALBUMIN SERPL-MCNC: 4.2 GM/DL (ref 3.5–5)
ALP SERPL-CCNC: 102 UNIT/L (ref 40–150)
ALT SERPL-CCNC: 61 UNIT/L (ref 0–55)
AST SERPL-CCNC: 27 UNIT/L (ref 5–34)
BILIRUBIN DIRECT+TOT PNL SERPL-MCNC: 0.3 MG/DL (ref 0–0.5)
BILIRUBIN DIRECT+TOT PNL SERPL-MCNC: 0.6 MG/DL (ref 0–0.8)
BILIRUBIN DIRECT+TOT PNL SERPL-MCNC: 0.9 MG/DL
PROT SERPL-MCNC: 6.9 GM/DL (ref 6.4–8.3)
TESTOST SERPL-MCNC: 443.1 NG/DL (ref 220.91–715.81)

## 2022-07-05 PROCEDURE — 36415 COLL VENOUS BLD VENIPUNCTURE: CPT

## 2022-07-05 PROCEDURE — 80076 HEPATIC FUNCTION PANEL: CPT

## 2022-07-05 PROCEDURE — 84403 ASSAY OF TOTAL TESTOSTERONE: CPT

## 2022-08-22 DIAGNOSIS — M76.62 TENDONITIS, ACHILLES, LEFT: Primary | ICD-10-CM

## 2022-08-23 ENCOUNTER — CLINICAL SUPPORT (OUTPATIENT)
Dept: REHABILITATION | Facility: HOSPITAL | Age: 57
End: 2022-08-23
Payer: COMMERCIAL

## 2022-08-23 DIAGNOSIS — M79.672 PAIN OF LEFT HEEL: Primary | ICD-10-CM

## 2022-08-23 PROCEDURE — 97110 THERAPEUTIC EXERCISES: CPT

## 2022-08-23 PROCEDURE — 97162 PT EVAL MOD COMPLEX 30 MIN: CPT

## 2022-08-23 NOTE — PROGRESS NOTES
[facility]  Physical Therapy Initial Evaluation    Name: Rinku Herzog  [facility] Number: [id number]    Therapy Diagnosis:   Encounter Diagnosis   Name Primary?    Pain of left heel Yes     Physician: Virgil Lujan MD    Physician Orders: PT Eval and Treat  Medical Diagnosis from Referral: Achilles Tendonitis, Left leg/ankle  Evaluation Date: 8/23/2022  Authorization Period Expiration: 11/23/2022  Plan of Care Expiration: 11/23/2022  Visit # / Visits authorized: 88 visits max    Time In: 1101  Time Out: 1152  Total Appointment Time (timed & untimed codes): 51 minutes  Total Treatment time (time-based codes) separate from Evaluation: 20 minutes    Surgery: none  Orthopedic Precautions: none  Pertinent History: Back Surgery, HTN,     Subjective   Rinku reports: about a 1 year history of on and off  left ankle/achilles pain. Today reports 5/10 level pain. He denies any trauma but rather a gradual onset over time. He states that his pain varies being worse in the mornings or after activity. Navigating stairs, prolonged standing, weight bearing or some positions will cause it to flare. He has to limit his activity because the more he does the more pain he will have. He describes his pain as burning with a sensation of tingling. He relates a story of having COVID and doing nothing for approximately 30 days and his symptoms went away. After resuming his normal activities the symptoms returned. He has been treated by podiatry and was given a heel lift but this made his knee hurt so he discontinued it. He was given a walking boot but did not use it. He does have a positional brace for at night to sleep and he feels this is beneficial. He has a TENS unit along with the use of ice seems to help. 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    CHARLY: gradual onset    Reason for visit: Left Achilles pain  Onset time and any changes: July 2021  Pain level and location:  "5/10  Describe pain:  Constant burning aching  Numbness/tingling: "tingling sensation" posterior heel  Agg factors: increase activity, stairs, prolonged standing, weight bearing  Ease factors: rest, ice, TENS  Worse when: in the mornings  Hx of adjacent joint pain: low back with subsequent surgery  Red flags: none  Functional Limitations: unable to   Goals: get rid of pain and return to PLOF  Prior therapy/intervention: yes, saw podiatrist for treatment    Medical History:   No past medical history on file.    Surgical History:   Rinku Herzog  has no past surgical history on file.    Medications:   Rinku currently has no medications in their medication list.    Allergies:   Review of patient's allergies indicates:  Not on File     Imaging,x-rays: posterior calcaneal spur, intact ankle mortise, mild hallux valgus    Outcome Measure:  Eval: LEFS 54/80 = 32.5% disability, 67.5% functional    Objective          Posture/Appearance    Right -normal  Left - mild swelling noted at the Achilles tendon; no deformity, no atrophy, no erythema      Gait: WNL's, no visible limp or antalgic gait pattern     Palpation    Right - ttp mild to moderate tenderness Achilles tendon more lateral aspect than medial                 No crepitus noted    Left - ttp none       Dermatomes    BLEs intact to light touch     ROM    Right -DF 10 degrees; PF 40 degrees, INV 35 degrees, EV 12 degrees     Left - -DF 8 degrees; PF 40 degrees, INV 30 degrees, EV 10 degrees    Strength    Right - Ankle: DF 5/5; PF 5/5; INV 5/5; EV 5/5              Knee: Flex 5/5; Ext 5/5              Hip: flex 5/5; Ext 5/5; Abd 5/5; Add 5/5    Left - Ankle: DF 5/5; PF 4+/5; INV 5/5; EV 5/5            Knee: Flex 5/5; Ext 5/5            Hip: flex 5/5; Ext 5/5; Abd 5/5; Add 5/5       Lumbar/SI Exam    Lumbar ROM: WFL's    Knee ROM is WNL's without pain    Sacral Provocation Left - negative    Sacral Provocation Right - negative     Special Tests    Anterior Drawer-negative "   Whitley's Test-negative  ARC Sign-negative   Squat    Double Leg  Right - normal alignment without pain  Left - mild flare of knee outward, mild achilles pain                         TREATMENT     Rinku received the treatments listed below:       Time Activities   Manual     TherAct     TherEx  HEP; had patient perform separate eccentric loading for the gastroc and soleus muscles   Gait     Neuro Re-ed     Modalities     E-Stim     Dry Needling     Canalith Repositioning       Home Exercises and Patient Education Provided    Education provided:   -Plan of care, HEP, orthopedic precautions, assistive device management     Written Home Exercises Provided: yes.  Exercises were reviewed and Rinku was able to demonstrate them prior to the end of the session. Rinku demonstrated good  understanding of the education provided.     Assessment   Rinku is a 56 y.o. male referred to outpatient Physical Therapy with a medical diagnosis of Achilles Tendonitis, Left leg/ankle. Patient presents with Achilles pain, mild decrease ROM left ankle compared to right, mild PF strength deficit left compared to right, mild swelling and mild pronation left compared to right. Patient will benefit from skilled outpatient Physical Therapy to address the deficits stated above and in the chart below, provide education, and to maximize patient's level of independence.     Patient prognosis is good      Plan of care discussed with patient: yes  Patient's spiritual, cultural and educational needs considered and patient is agreeable to the plan of care and goals as stated below:     Anticipated Barriers for therapy: none      Goals:  Short Term Goals: 6 weeks   1. Patient will report at least 20% disability reduction LEFS to indicate clinically significant functional improvement  2. Patient will demonstrate complete ankle ROM to allow for improved left ankle biomechanics  3. Patient will demonstrate 5/5 strength to allow return to function without  imbalance  4. Patient will demonstrate independence with HEP    Long Term Goals: 12 weeks   1. Patient will report at least 40% disability reduction LEFS to indicate clinically significant functional improvement  2. Patient will demonstrate return to PLOF prior to current diagnosis    Plan   Plan of care Certification: 8/23/2022 to 11/23/2022    Outpatient Physical Therapy 2-3 times weekly for 12 weeks to include the following interventions: Gait Training, Manual Therapy, Moist Heat/ Ice, Neuromuscular Re-ed, Patient Education, Self Care, Therapeutic Activities and Therapeutic Exercise.     Chandler Castellon, PT

## 2022-08-25 ENCOUNTER — CLINICAL SUPPORT (OUTPATIENT)
Dept: REHABILITATION | Facility: HOSPITAL | Age: 57
End: 2022-08-25
Payer: COMMERCIAL

## 2022-08-25 ENCOUNTER — LAB VISIT (OUTPATIENT)
Dept: LAB | Facility: HOSPITAL | Age: 57
End: 2022-08-25
Attending: UROLOGY
Payer: COMMERCIAL

## 2022-08-25 DIAGNOSIS — E29.1 3-OXO-5 ALPHA-STEROID DELTA 4-DEHYDROGENASE DEFICIENCY: Primary | ICD-10-CM

## 2022-08-25 DIAGNOSIS — M76.62 TENDONITIS, ACHILLES, LEFT: Primary | ICD-10-CM

## 2022-08-25 LAB
ALBUMIN SERPL-MCNC: 4.2 GM/DL (ref 3.5–5)
ALBUMIN/GLOB SERPL: 1.4 RATIO (ref 1.1–2)
ALP SERPL-CCNC: 88 UNIT/L (ref 40–150)
ALT SERPL-CCNC: 41 UNIT/L (ref 0–55)
AST SERPL-CCNC: 23 UNIT/L (ref 5–34)
BASOPHILS # BLD AUTO: 0.05 X10(3)/MCL (ref 0–0.2)
BASOPHILS NFR BLD AUTO: 0.7 %
BILIRUBIN DIRECT+TOT PNL SERPL-MCNC: 0.4 MG/DL
BUN SERPL-MCNC: 20 MG/DL (ref 8.4–25.7)
CALCIUM SERPL-MCNC: 9.7 MG/DL (ref 8.4–10.2)
CHLORIDE SERPL-SCNC: 109 MMOL/L (ref 98–107)
CO2 SERPL-SCNC: 22 MMOL/L (ref 22–29)
CREAT SERPL-MCNC: 1.04 MG/DL (ref 0.73–1.18)
EOSINOPHIL # BLD AUTO: 0.64 X10(3)/MCL (ref 0–0.9)
EOSINOPHIL NFR BLD AUTO: 8.8 %
ERYTHROCYTE [DISTWIDTH] IN BLOOD BY AUTOMATED COUNT: 13.3 % (ref 11.5–17)
GFR SERPLBLD CREATININE-BSD FMLA CKD-EPI: >60 MLS/MIN/1.73/M2
GLOBULIN SER-MCNC: 3 GM/DL (ref 2.4–3.5)
GLUCOSE SERPL-MCNC: 119 MG/DL (ref 74–100)
HCT VFR BLD AUTO: 45.3 % (ref 42–52)
HGB BLD-MCNC: 15.5 GM/DL (ref 14–18)
IMM GRANULOCYTES # BLD AUTO: 0.08 X10(3)/MCL (ref 0–0.04)
IMM GRANULOCYTES NFR BLD AUTO: 1.1 %
LYMPHOCYTES # BLD AUTO: 1.59 X10(3)/MCL (ref 0.6–4.6)
LYMPHOCYTES NFR BLD AUTO: 22 %
MCH RBC QN AUTO: 30.9 PG (ref 27–31)
MCHC RBC AUTO-ENTMCNC: 34.2 MG/DL (ref 33–36)
MCV RBC AUTO: 90.2 FL (ref 80–94)
MONOCYTES # BLD AUTO: 0.52 X10(3)/MCL (ref 0.1–1.3)
MONOCYTES NFR BLD AUTO: 7.2 %
NEUTROPHILS # BLD AUTO: 4.4 X10(3)/MCL (ref 2.1–9.2)
NEUTROPHILS NFR BLD AUTO: 60.2 %
NRBC BLD AUTO-RTO: 0 %
PLATELET # BLD AUTO: 174 X10(3)/MCL (ref 130–400)
PMV BLD AUTO: 10.1 FL (ref 7.4–10.4)
POTASSIUM SERPL-SCNC: 3.9 MMOL/L (ref 3.5–5.1)
PROT SERPL-MCNC: 7.2 GM/DL (ref 6.4–8.3)
RBC # BLD AUTO: 5.02 X10(6)/MCL (ref 4.7–6.1)
SODIUM SERPL-SCNC: 143 MMOL/L (ref 136–145)
WBC # SPEC AUTO: 7.2 X10(3)/MCL (ref 4.5–11.5)

## 2022-08-25 PROCEDURE — 82670 ASSAY OF TOTAL ESTRADIOL: CPT

## 2022-08-25 PROCEDURE — 97110 THERAPEUTIC EXERCISES: CPT

## 2022-08-25 PROCEDURE — 84403 ASSAY OF TOTAL TESTOSTERONE: CPT

## 2022-08-25 PROCEDURE — 85025 COMPLETE CBC W/AUTO DIFF WBC: CPT

## 2022-08-25 PROCEDURE — 80053 COMPREHEN METABOLIC PANEL: CPT

## 2022-08-25 PROCEDURE — 97140 MANUAL THERAPY 1/> REGIONS: CPT

## 2022-08-25 PROCEDURE — 84402 ASSAY OF FREE TESTOSTERONE: CPT

## 2022-08-25 PROCEDURE — 36415 COLL VENOUS BLD VENIPUNCTURE: CPT

## 2022-08-25 NOTE — PLAN OF CARE
Physical Therapy Treatment Note     Name: Rinku Herzog  Clinic Number: [id number]    Therapy Diagnosis: No diagnosis found.  Physician: Virgil Luajn MD    Visit Date: 8/25/2022    Physician Orders: PT Eval and Treat  Medical Diagnosis from Referral: Achilles Tendonitis, Left leg/ankle  Evaluation Date: 8/23/2022  Authorization Period Expiration: 11/23/2022  Plan of Care Expiration: 11/23/2022  Visit # / Visits authorized: 1/88     Time In: 0844  Time Out: 0939  Total Billable Time: 55 minutes    Surgery: none  Orthopedic Precautions: none  Pertinent History: Back Surgery, HTN,     Subjective     Patient reports: more posterior achilles pain today that he rates as 5/10. Using ice, TENS, and a positional sock/boot which he feels is beneficial. He agrees to PT session.     Response to previous treatment: good    Pain: 510  Location: Left Achilles Tendon     Outcome Measure:  Eval: LEFS 54/80 = 32.5% disability, 67.5% functional    Objective     Rinku received the following treatment:     Time Activities   Manual 30 Mobilizations and distractions of the calcaneus and subtalar joint; IASTM cross frictional and longitudinal; PROM, KT tape   TherAct     TherEx 25 Seated heel raises, single leg heel raises, Eccentric heel on stairs   Gait     Neuro Re-ed     Modalities     E-Stim     Dry Needling     Canalith Repositioning           Home Exercises Provided and Patient Education Provided     Education provided:   - Plan of care, HEP reviewed     Written Home Exercises Provided: yes.  Exercises were reviewed and Rinku was able to demonstrate them prior to the end of the session. Rinku demonstrated good  understanding of the education provided.     Assessment     Rinku is a 56 y.o. male referred to outpatient Physical Therapy with a medical diagnosis of Achilles Tendonitis, Left leg/ankle. Today with insertional Achilles pain that he rates about 3/10. Tolerated the exercise program really well that focused on eccentric  loading/strengthening. His pain level stayed below 5/10 Afterwards KT tape was applied for decompression and support which patient felt was helpful. We will continue with repetitive stretching and lengthening of the muscle tendon with goal to progress to the musculotendinous unit being able to effectively absorb the load without pain.    Patient prognosis is good     Anticipated barriers to physical therapy: none    Goals: Rinku is progressing well towards his goals.    Plan     Plan of care Certification: 8/23/2022 to 11/23/2022     Outpatient Physical Therapy 2-3 times weekly for 12 weeks to include the following interventions: Gait Training, Manual Therapy, Moist Heat/ Ice, Neuromuscular Re-ed, Patient Education, Self Care, Therapeutic Activities and Therapeutic Exercise.     Chandler Castellon, PT

## 2022-08-26 LAB
ESTRADIOL SERPL HS-MCNC: <24 PG/ML
TESTOST SERPL-MCNC: 255.43 NG/DL (ref 220.91–715.81)

## 2022-08-30 LAB — TESTOST FREE SERPL-MCNC: 35 PG/ML

## 2022-09-06 ENCOUNTER — CLINICAL SUPPORT (OUTPATIENT)
Dept: REHABILITATION | Facility: HOSPITAL | Age: 57
End: 2022-09-06
Payer: COMMERCIAL

## 2022-09-06 DIAGNOSIS — M76.62 TENDONITIS, ACHILLES, LEFT: Primary | ICD-10-CM

## 2022-09-06 PROCEDURE — 97110 THERAPEUTIC EXERCISES: CPT

## 2022-09-06 PROCEDURE — 97140 MANUAL THERAPY 1/> REGIONS: CPT

## 2022-09-06 NOTE — PLAN OF CARE
Physical Therapy Treatment Note     Name: Rinku Herzog  Clinic Number: [id number]    Therapy Diagnosis: No diagnosis found.  Physician: Virgil Lujan MD    Visit Date: 9/6/2022    Physician Orders: PT Eval and Treat  Medical Diagnosis from Referral: Achilles Tendonitis, Left leg/ankle  Evaluation Date: 8/23/2022  Authorization Period Expiration: 11/23/2022  Plan of Care Expiration: 11/23/2022  Visit # / Visits authorized: 1/88     Time In: 1341  Time Out: 1426  Total Billable Time: 45 minutes    Surgery: none  Orthopedic Precautions: none  Pertinent History: Back Surgery, HTN,     Subjective     Patient reports: not really having any achilles pain but is pretty sore. Went to the [employer] game over the weekend and did some walking. Using ice, TENS, and a positional sock/boot which he feels is beneficial. He agrees to PT session.     Response to previous treatment: good    Pain: 3/10  Location: Left Achilles Tendon     Outcome Measure:  Eval: LEFS 54/80 = 32.5% disability, 67.5% functional    Objective     Rinku received the following treatment:     Time Activities   Manual 20 Mobilizations and distractions of the calcaneus and subtalar joint; IASTM cross frictional and longitudinal; PROM, KT tape   TherAct     TherEx 25 Seated heel raises, single leg heel raises, Eccentric heel on stairs   Gait     Neuro Re-ed     Modalities     E-Stim     Dry Needling     Canalith Repositioning           Home Exercises Provided and Patient Education Provided     Education provided:   - Plan of care, HEP reviewed     Written Home Exercises Provided: yes.  Exercises were reviewed and Rinku was able to demonstrate them prior to the end of the session. Rinku demonstrated good  understanding of the education provided.     Assessment     Rinku is a 56 y.o. male referred to outpatient Physical Therapy with a medical diagnosis of Achilles Tendonitis, Left leg/ankle. Today we could definitely isolate the area  and it is insertional Achilles  "just lateral to center. His pain was 3/10 inititally and he did complain of some "burning" along the Achilles but settled down once stopping the exercise. Afterwards KT tape was applied for decompression and support which patient felt was helpful. We will continue with repetitive stretching and lengthening of the muscle tendon with goal to progress to the musculotendinous unit being able to effectively absorb the load without pain.    Patient prognosis is good     Anticipated barriers to physical therapy: none    Goals: Rinku is progressing well towards his goals.    Plan     Plan of care Certification: 8/23/2022 to 11/23/2022     Outpatient Physical Therapy 2-3 times weekly for 12 weeks to include the following interventions: Gait Training, Manual Therapy, Moist Heat/ Ice, Neuromuscular Re-ed, Patient Education, Self Care, Therapeutic Activities and Therapeutic Exercise.     Chandler Castellon, PT   "

## 2022-09-08 ENCOUNTER — CLINICAL SUPPORT (OUTPATIENT)
Dept: REHABILITATION | Facility: HOSPITAL | Age: 57
End: 2022-09-08
Payer: COMMERCIAL

## 2022-09-08 DIAGNOSIS — M76.62 TENDONITIS, ACHILLES, LEFT: Primary | ICD-10-CM

## 2022-09-08 PROCEDURE — 97035 APP MDLTY 1+ULTRASOUND EA 15: CPT

## 2022-09-08 PROCEDURE — 97110 THERAPEUTIC EXERCISES: CPT

## 2022-09-08 PROCEDURE — 97140 MANUAL THERAPY 1/> REGIONS: CPT

## 2022-09-08 NOTE — PLAN OF CARE
Physical Therapy Treatment Note     Name: Rinku Herzog  Clinic Number: [id number]    Therapy Diagnosis: No diagnosis found.  Physician: Virgil Lujan MD    Visit Date: 9/8/2022    Physician Orders: PT Eval and Treat  Medical Diagnosis from Referral: Achilles Tendonitis, Left leg/ankle  Evaluation Date: 8/23/2022  Authorization Period Expiration: 11/23/2022  Plan of Care Expiration: 11/23/2022  Visit # / Visits authorized: 1/88     Time In: 0744  Time Out: 0837  Total Billable Time: 53 minutes    Surgery: none  Orthopedic Precautions: none  Pertinent History: Back Surgery, HTN,     Subjective     Patient reports:  Soreness in his calf today from the exercises last time, same soreness in the Achilles. He agrees to PT session.     Response to previous treatment: good    Pain: 3/10  Location: Left Achilles Tendon     Outcome Measure:  Eval: LEFS 54/80 = 32.5% disability, 67.5% functional    Objective     Rinku received the following treatment:     Time Activities   Manual 20 Mobilizations and distractions of the calcaneus and subtalar joint; IASTM cross frictional and longitudinal; PROM, KT tape   TherAct     TherEx 25 Seated heel raises, gastroc stretch, Soleus stretch, single leg heel raises, Eccentric heel on stairs, heel drops Gastroc and Soleus, quick rebound heel raises,   Gait     Neuro Re-ed     Modalities 8 Ultrasound to insertion Achilles   E-Stim     Dry Needling     Canalith Repositioning           Home Exercises Provided and Patient Education Provided     Education provided:   - Plan of care, HEP reviewed     Written Home Exercises Provided: yes.  Exercises were reviewed and Rinku was able to demonstrate them prior to the end of the session. Rinku demonstrated good  understanding of the education provided.     Assessment     Rinku is a 56 y.o. male referred to outpatient Physical Therapy with a medical diagnosis of Achilles Tendonitis, Left leg/ankle. Today we could definitely isolate the area  and it  "is insertional Achilles just lateral to center. His pain was 3/10 inititally and he did complain of some "burning" along the Achilles but settled down once stopping the exercise. Afterwards KT tape was applied for decompression and support which patient felt was helpful. We will continue with repetitive stretching and lengthening of the muscle tendon with goal to progress to the musculotendinous unit being able to effectively absorb the load without pain.    Patient prognosis is good     Anticipated barriers to physical therapy: none    Goals: Rinku is progressing well towards his goals.    Plan     Plan of care Certification: 8/23/2022 to 11/23/2022     Outpatient Physical Therapy 2-3 times weekly for 12 weeks to include the following interventions: Gait Training, Manual Therapy, Moist Heat/ Ice, Neuromuscular Re-ed, Patient Education, Self Care, Therapeutic Activities and Therapeutic Exercise.     Chandler Castellon, PT   "

## 2022-09-13 ENCOUNTER — CLINICAL SUPPORT (OUTPATIENT)
Dept: REHABILITATION | Facility: HOSPITAL | Age: 57
End: 2022-09-13
Payer: COMMERCIAL

## 2022-09-13 DIAGNOSIS — M76.62 TENDONITIS, ACHILLES, LEFT: Primary | ICD-10-CM

## 2022-09-13 PROCEDURE — 97110 THERAPEUTIC EXERCISES: CPT

## 2022-09-13 PROCEDURE — 97035 APP MDLTY 1+ULTRASOUND EA 15: CPT

## 2022-09-13 PROCEDURE — 97140 MANUAL THERAPY 1/> REGIONS: CPT

## 2022-09-13 NOTE — PLAN OF CARE
Physical Therapy Treatment Note     Name: Rinku Herzog  Clinic Number: [id number]    Therapy Diagnosis: No diagnosis found.  Physician: Virgil Lujan MD    Visit Date: 9/13/2022    Physician Orders: PT Eval and Treat  Medical Diagnosis from Referral: Achilles Tendonitis, Left leg/ankle  Evaluation Date: 8/23/2022  Authorization Period Expiration: 11/23/2022  Plan of Care Expiration: 11/23/2022  Visit # / Visits authorized: 1/88     Time In: 1202  Time Out: 1255  Total Billable Time: 53 minutes    Surgery: none  Orthopedic Precautions: none  Pertinent History: Back Surgery, HTN,     Subjective     Rinku comes to therapy today reporting 3/10 pain. Continues to be worse when getting out of bed in the mornings. Prolong standing is a trigger that will bring on the pain. Feels the KT tape is beneficial. He agrees to PT session.     Response to previous treatment: good    Pain: 3/10  Location: Left Achilles Tendon     Outcome Measure:  Eval: LEFS 54/80 = 32.5% disability, 67.5% functional    Objective     Rinku received the following treatment:     Time Activities   Manual 30 Mobilizations and distractions of the calcaneus and subtalar joint; IASTM cross frictional and longitudinal; PROM, KT tape, Cold pack   TherAct     TherEx 15 Seated heel raises, gastroc stretch, Soleus stretch, Eccentric heel on stairs, heel drops    Gait     Neuro Re-ed     Modalities 8 Ultrasound to insertion Achilles   E-Stim     Dry Needling     Canalith Repositioning           Home Exercises Provided and Patient Education Provided     Education provided:   - Plan of care, HEP reviewed     Written Home Exercises Provided: yes.  Exercises were reviewed and Rinku was able to demonstrate them prior to the end of the session. Rinku demonstrated good  understanding of the education provided.     Assessment     Rinku is a 56 y.o. male referred to outpatient Physical Therapy with a medical diagnosis of Achilles Tendonitis, Left leg/ankle.     Rinku's  pain remains insertional Achilles just lateral to center and has mild tissue sensitivity. His pain was 3/10 coming in but during eccentric heel drops pain increased to 5/10 and cold pack was applied post exercise. There is some mild atrophy left calf compared to the right but strength is pretty equal. There is no pronation deformity at the ankle as it appears in good alignment. Following cold pack KT tape was applied for decompression and support which patient feels is helpful. We will continue with repetitive stretching and lengthening of the muscle tendon with goal to progress to the musculotendinous unit being able to effectively absorb the load without pain.    Patient prognosis is good     Anticipated barriers to physical therapy: none    Goals: Rinku is progressing well towards his goals.    Plan     Plan of care Certification: 8/23/2022 to 11/23/2022     Outpatient Physical Therapy 2-3 times weekly for 12 weeks to include the following interventions: Gait Training, Manual Therapy, Moist Heat/ Ice, Neuromuscular Re-ed, Patient Education, Self Care, Therapeutic Activities and Therapeutic Exercise.     Chandler Castellon, PT

## 2022-09-15 ENCOUNTER — CLINICAL SUPPORT (OUTPATIENT)
Dept: REHABILITATION | Facility: HOSPITAL | Age: 57
End: 2022-09-15
Payer: COMMERCIAL

## 2022-09-15 PROCEDURE — 97035 APP MDLTY 1+ULTRASOUND EA 15: CPT

## 2022-09-15 PROCEDURE — 97140 MANUAL THERAPY 1/> REGIONS: CPT

## 2022-09-15 PROCEDURE — 97110 THERAPEUTIC EXERCISES: CPT

## 2022-09-15 NOTE — PLAN OF CARE
Physical Therapy Treatment Note     Name: Rinku Herzog  Clinic Number: [id number]    Therapy Diagnosis: No diagnosis found.  Physician: Virgil Lujan MD    Visit Date: 9/15/2022    Physician Orders: PT Eval and Treat  Medical Diagnosis from Referral: Achilles Tendonitis, Left leg/ankle  Evaluation Date: 8/23/2022  Authorization Period Expiration: 11/23/2022  Plan of Care Expiration: 11/23/2022  Visit # / Visits authorized: 1/88     Time In: 0929  Time Out: 1255  Total Billable Time: 47 minutes    Surgery: none  Orthopedic Precautions: none  Pertinent History: Back Surgery, HTN,     Subjective     Rinku comes to therapy today reporting that the heel is doing better having 1-2 level pain.  Remains at is worse when getting out of bed in the mornings but states getting out of bed this morning was much better. Prolong standing is a trigger that will bring on the pain. Feels the KT tape is beneficial. He agrees to PT session.     Response to previous treatment: good    Pain: 1-2/10  Location: Left Achilles Tendon     Outcome Measure:  Eval: LEFS 54/80 = 32.5% disability, 67.5% functional    Objective     Rinku received the following treatment:     Time Activities   Manual 24 Mobilizations and distractions of the calcaneus and subtalar joint; IASTM cross frictional and longitudinal; PROM, KT tape, Cold pack   TherAct     TherEx 15 Seated heel raises, gastroc stretch, Soleus stretch, Eccentric heel on stairs, heel drops    Gait     Neuro Re-ed     Modalities 8 Ultrasound to insertion Achilles   E-Stim     Dry Needling     Canalith Repositioning           Home Exercises Provided and Patient Education Provided     Education provided:   - Plan of care, HEP reviewed     Written Home Exercises Provided: yes.  Exercises were reviewed and Rinku was able to demonstrate them prior to the end of the session. Rinku demonstrated good  understanding of the education provided.     Assessment     Rinku is a 56 y.o. male referred to  outpatient Physical Therapy with a medical diagnosis of Achilles Tendonitis, Left leg/ankle.     Rinku's pain remains insertional Achilles just lateral to center and has mild tissue sensitivity. His pain was 1-2/10 coming in and left with 2-3 level. Had no issues with any of the exercises today other than calf muscle fatigue.There remains some mild atrophy left calf compared to the right but strength is pretty equal. There is no pronation deformity at the ankle as it appears in good alignment. Following cold pack KT tape was applied for decompression and support which patient feels is helpful. We will continue with repetitive stretching and lengthening of the muscle tendon with goal to progress to the musculotendinous unit being able to effectively absorb the load without pain.    Patient prognosis is good     Anticipated barriers to physical therapy: none    Goals: Rinku is progressing well towards his goals.    Plan     Plan of care Certification: 8/23/2022 to 11/23/2022     Outpatient Physical Therapy 2-3 times weekly for 12 weeks to include the following interventions: Gait Training, Manual Therapy, Moist Heat/ Ice, Neuromuscular Re-ed, Patient Education, Self Care, Therapeutic Activities and Therapeutic Exercise.     Chandler Castellon, PT

## 2022-09-19 ENCOUNTER — CLINICAL SUPPORT (OUTPATIENT)
Dept: REHABILITATION | Facility: HOSPITAL | Age: 57
End: 2022-09-19
Payer: COMMERCIAL

## 2022-09-19 DIAGNOSIS — M76.62 TENDONITIS, ACHILLES, LEFT: Primary | ICD-10-CM

## 2022-09-19 PROCEDURE — 97140 MANUAL THERAPY 1/> REGIONS: CPT

## 2022-09-19 PROCEDURE — 97110 THERAPEUTIC EXERCISES: CPT

## 2022-09-19 PROCEDURE — 97035 APP MDLTY 1+ULTRASOUND EA 15: CPT

## 2022-09-19 NOTE — PLAN OF CARE
Physical Therapy Treatment Note     Name: Rinku Herzog  Clinic Number: [id number]    Therapy Diagnosis: No diagnosis found.  Physician: Virgil Lujan MD    Visit Date: 9/19/2022    Physician Orders: PT Eval and Treat  Medical Diagnosis from Referral: Achilles Tendonitis, Left leg/ankle  Evaluation Date: 8/23/2022  Authorization Period Expiration: 11/23/2022  Plan of Care Expiration: 11/23/2022  Visit # / Visits authorized: 1/88     Time In: 1050  Time Out: 1138  Total Billable Time: 48 minutes    Surgery: none  Orthopedic Precautions: none  Pertinent History: Back Surgery, HTN,     Subjective     Rinku comes to therapy today reporting that the heel is doing better having 1-2 level pain where he had no pain. Still worse in the mornings getting out of bed. Continues to feel the KT tape is beneficial. He agrees to PT session.     Response to previous treatment: good    Pain: 1-2/10  Location: Left Achilles Tendon     Outcome Measure:  Eval: LEFS 54/80 = 32.5% disability, 67.5% functional  9/19/2022: LEFS 66/80 = 17.5% disability, 82.5% functional  Objective     Rinku received the following treatment:     Time Activities   Manual 25 Mobilizations and distractions of the calcaneus and subtalar joint; IASTM cross frictional and longitudinal; PROM, KT tape, Cold pack   TherAct     TherEx 15 Seated heel raises, gastroc stretch, Soleus stretch, Eccentric heel on stairs, heel drops    Gait     Neuro Re-ed     Modalities 8 Ultrasound to insertion Achilles   E-Stim     Dry Needling     Canalith Repositioning           Home Exercises Provided and Patient Education Provided     Education provided:   - Plan of care, HEP reviewed     Written Home Exercises Provided: yes.  Exercises were reviewed and Rinku was able to demonstrate them prior to the end of the session. Rinku demonstrated good  understanding of the education provided.     Assessment     Rinku is a 56 y.o. male referred to outpatient Physical Therapy with a medical  diagnosis of Achilles Tendonitis, Left leg/ankle.     Rinku is making progress evidenced by periods of time over the weekend where he had no pain his improved score on the LEFS. He went from 32.5 disability to 17.5%, he is more functional. Area remains insertional Achilles. Again no issues with any of the exercises other than calf muscle fatigue. Following cold pack KT tape was applied for decompression and support which patient feels is helpful. We will continue with repetitive stretching and lengthening of the muscle tendon with goal to progress to the musculotendinous unit being able to effectively absorb the load without pain.    Patient prognosis is good     Anticipated barriers to physical therapy: none    Goals: Rinku is progressing well towards his goals.    Plan     Plan of care Certification: 8/23/2022 to 11/23/2022     Outpatient Physical Therapy 2-3 times weekly for 12 weeks to include the following interventions: Gait Training, Manual Therapy, Moist Heat/ Ice, Neuromuscular Re-ed, Patient Education, Self Care, Therapeutic Activities and Therapeutic Exercise.     Chandler Castellon, PT

## 2022-09-22 ENCOUNTER — CLINICAL SUPPORT (OUTPATIENT)
Dept: REHABILITATION | Facility: HOSPITAL | Age: 57
End: 2022-09-22
Payer: COMMERCIAL

## 2022-09-22 DIAGNOSIS — M76.62 TENDONITIS, ACHILLES, LEFT: Primary | ICD-10-CM

## 2022-09-22 PROCEDURE — 97035 APP MDLTY 1+ULTRASOUND EA 15: CPT

## 2022-09-22 PROCEDURE — 97140 MANUAL THERAPY 1/> REGIONS: CPT

## 2022-09-22 PROCEDURE — 97110 THERAPEUTIC EXERCISES: CPT

## 2022-09-22 NOTE — PLAN OF CARE
Physical Therapy Treatment Note     Name: Rinku Herzog  Clinic Number: [id number]    Therapy Diagnosis: No diagnosis found.  Physician: Virgil Lujan MD    Visit Date: 9/22/2022    Physician Orders: PT Eval and Treat  Medical Diagnosis from Referral: Achilles Tendonitis, Left leg/ankle  Evaluation Date: 8/23/2022  Authorization Period Expiration: 11/23/2022  Plan of Care Expiration: 11/23/2022  Visit # / Visits authorized: 1/88     Time In: 0726  Time Out: 0814  Total Billable Time: 48 minutes    Surgery: none  Orthopedic Precautions: none  Pertinent History: Back Surgery, HTN,     Subjective     Rinku saw his doctor yesterday. He has gone from 5-6/10 pain to 2-3/10 pain so his doctor was pleased with the progress and told him to continue with his therapy. He agrees to PT session.     Response to previous treatment: good    Pain: 1-2/10  Location: Left Achilles Tendon     Outcome Measure:  Eval: LEFS 54/80 = 32.5% disability, 67.5% functional  9/19/2022: LEFS 66/80 = 17.5% disability, 82.5% functional  Objective     Rinku received the following treatment:     Time Activities   Manual 25 Mobilizations and distractions of the calcaneus and subtalar joint; IASTM cross frictional and longitudinal; PROM, KT tape, Cold pack   TherAct     TherEx 15 Seated heel raises, gastroc stretch, Soleus stretch, Eccentric heel on stairs, heel drops    Gait     Neuro Re-ed     Modalities 8 Ultrasound to insertion Achilles   E-Stim     Dry Needling     Canalith Repositioning           Home Exercises Provided and Patient Education Provided     Education provided:   - Plan of care, HEP reviewed     Written Home Exercises Provided: yes.  Exercises were reviewed and Rinku was able to demonstrate them prior to the end of the session. Rinku demonstrated good  understanding of the education provided.     Assessment     Rinku is a 56 y.o. male referred to outpatient Physical Therapy with a medical diagnosis of Achilles Tendonitis, Left  leg/ankle.     Rinku has made progress but not yet where we need to be. No real tissue tenderness with palpation today. Doing well with all exercises having no pain but just some general calf burning due to fatigue. Ankle ROM remains good and where are continuing to stretch both the gastroc and soleus. KT tape remains beneficial to this point.  We will continue with eccentric loading, repetitive stretching and lengthening of the muscle tendon with goal to progress to the musculotendinous unit being able to effectively absorb the load without pain.    Patient prognosis is good     Anticipated barriers to physical therapy: none    Goals: Rinku is progressing well towards his goals.    Plan     Plan of care Certification: 8/23/2022 to 11/23/2022     Outpatient Physical Therapy 2-3 times weekly for 12 weeks to include the following interventions: Gait Training, Manual Therapy, Moist Heat/ Ice, Neuromuscular Re-ed, Patient Education, Self Care, Therapeutic Activities and Therapeutic Exercise.     Chandler Castellon, PT

## 2022-10-04 ENCOUNTER — CLINICAL SUPPORT (OUTPATIENT)
Dept: REHABILITATION | Facility: HOSPITAL | Age: 57
End: 2022-10-04
Payer: COMMERCIAL

## 2022-10-04 DIAGNOSIS — M76.62 TENDONITIS, ACHILLES, LEFT: Primary | ICD-10-CM

## 2022-10-04 DIAGNOSIS — M79.672 PAIN OF LEFT HEEL: ICD-10-CM

## 2022-10-04 PROCEDURE — 97110 THERAPEUTIC EXERCISES: CPT

## 2022-10-04 PROCEDURE — 97530 THERAPEUTIC ACTIVITIES: CPT

## 2022-10-04 PROCEDURE — 97140 MANUAL THERAPY 1/> REGIONS: CPT

## 2022-10-04 NOTE — PLAN OF CARE
Physical Therapy Treatment Note     Name: Rinku Herzog  Clinic Number: [id number]    Therapy Diagnosis:   Encounter Diagnoses   Name Primary?    Tendonitis, Achilles, left Yes    Pain of left heel      Physician: Virgil Lujan MD    Visit Date: 10/4/2022    Physician Orders: PT Eval and Treat  Medical Diagnosis from Referral: Achilles Tendonitis, Left leg/ankle  Evaluation Date: 8/23/2022  Authorization Period Expiration: 11/23/2022  Plan of Care Expiration: 11/23/2022  Visit # / Visits authorized: 1/88     Time In: 0945  Time Out: 1035  Total Billable Time: 50 minutes    Surgery: none  Orthopedic Precautions: none  Pertinent History: Back Surgery, HTN,     Subjective     Rinku reports pain is getting better but still hurts with prolonged standing or walking; only with [place].  Response to previous treatment: good    Pain: 1-2/10  Location: Left Achilles Tendon at lateral attachment    Outcome Measure:  Eval: LEFS 54/80 = 32.5% disability, 67.5% functional  9/19/2022: LEFS 66/80 = 17.5% disability, 82.5% functional  Objective     Rinku received the following treatment:     Time Activities   Manual 12 Mobilizations and distractions of the calcaneus and subtalar joint as well as forefoot; STM cross frictional and longitudinal lower leg and foot; PROM   TherAct 10 Forward and backward stepping with exaggerated L toe off; toe walking with/without knee flexion   TherEx 18 Seated heel raises, gastroc stretch, Soleus stretch, Eccentric gastroc in standing and peroneals SL with 5#   Gait     Neuro Re-ed     Modalities  Ultrasound to insertion Achilles   E-Stim     Dry Needling 10 Achilles tendonitis and peroneal protocol   Canalith Repositioning           Home Exercises Provided and Patient Education Provided     Education provided:   - Plan of care, HEP reviewed     Written Home Exercises Provided: yes.  Exercises were reviewed and Rinku was able to demonstrate them prior to the end of the session. Rinku demonstrated  good  understanding of the education provided.     Assessment     Rinku is a 56 y.o. male referred to outpatient Physical Therapy with a medical diagnosis of Achilles Tendonitis, Left leg/ankle.     Rinku has made progress but continues with discomfort with prolonged weight bearing. He has limited eversion and peroneal weakness which may be contributing to continued pain and lack of toe off with ambulation. Pt responded well to dry needling for achilles and peroneal protocol with good twitch response of peroneals.    Patient prognosis is good     Anticipated barriers to physical therapy: none    Goals: Rinku is progressing well towards his goals.    Plan     Plan of care Certification: 8/23/2022 to 11/23/2022     Outpatient Physical Therapy 2-3 times weekly for 12 weeks to include the following interventions: Gait Training, Manual Therapy, Moist Heat/ Ice, Neuromuscular Re-ed, Patient Education, Self Care, Therapeutic Activities and Therapeutic Exercise.     Petty Wood, PT

## 2022-10-13 ENCOUNTER — CLINICAL SUPPORT (OUTPATIENT)
Dept: REHABILITATION | Facility: HOSPITAL | Age: 57
End: 2022-10-13
Payer: COMMERCIAL

## 2022-10-13 DIAGNOSIS — M76.62 TENDONITIS, ACHILLES, LEFT: Primary | ICD-10-CM

## 2022-10-13 PROCEDURE — 97140 MANUAL THERAPY 1/> REGIONS: CPT

## 2022-10-13 PROCEDURE — 97110 THERAPEUTIC EXERCISES: CPT

## 2022-10-13 PROCEDURE — 97530 THERAPEUTIC ACTIVITIES: CPT

## 2022-10-13 PROCEDURE — 97035 APP MDLTY 1+ULTRASOUND EA 15: CPT

## 2022-10-13 NOTE — PLAN OF CARE
"  Physical Therapy Treatment Note     Name: Rinku Herzog  Clinic Number: [id number]    Therapy Diagnosis:   No diagnosis found.    Physician: Virgil Lujan MD    Visit Date: 10/13/2022    Physician Orders: PT Eval and Treat  Medical Diagnosis from Referral: Achilles Tendonitis, Left leg/ankle  Evaluation Date: 8/23/2022  Authorization Period Expiration: 11/23/2022  Plan of Care Expiration: 11/23/2022  Visit # / Visits authorized: 1/88     Time In: 1201  Time Out: 1300  Total Billable Time: 59 minutes    Surgery: none  Orthopedic Precautions: none  Pertinent History: Back Surgery, HTN,     Subjective     Rinku comes to therapy today reporting that "its feeling the best its felt" and feels that the dry needling was beneficial. Had some soreness when [place] and had to climb stairs but resolved with rest. He agrees to PT session.    Pain: 1-2/10  Location: Left Achilles Tendon at lateral attachment    Outcome Measure:  Eval: LEFS 54/80 = 32.5% disability, 67.5% functional  9/19/2022: LEFS 66/80 = 17.5% disability, 82.5% functional  Objective     Rinku received the following treatment:     Time Activities   Manual 12 Mobilizations and distractions of the calcaneus and subtalar joint as well as forefoot; STM cross frictional and longitudinal lower leg and foot; PROM   TherAct 10 Forward and backward stepping with exaggerated L toe off; toe walking with/without knee flexion   TherEx 18 Seated heel raises, gastroc stretch, Soleus stretch, Eccentric gastroc in standing and peroneals SL with 5#   Gait     Neuro Re-ed     Modalities 8 Ultrasound to insertion Achilles   E-Stim     Dry Needling     Canalith Repositioning           Home Exercises Provided and Patient Education Provided     Education provided:   - Plan of care, HEP reviewed     Written Home Exercises Provided: yes.  Exercises were reviewed and Rinku was able to demonstrate them prior to the end of the session. Rinku demonstrated good  " understanding of the education provided.     Assessment     Rinku is a 56 y.o. male referred to outpatient Physical Therapy with a medical diagnosis of Achilles Tendonitis, Left leg/ankle.     Rinku has made progress but continues with discomfort with prolonged weight bearing. He has limited eversion and peroneal weakness which may be contributing to continued pain and lack of toe off with ambulation. Patient appears to have responded well to dry needling for achilles and peroneal protocol. Will continue with the plan of care.    Patient prognosis is good     Anticipated barriers to physical therapy: none    Goals: Rinku is progressing well towards his goals.    Plan     Plan of care Certification: 8/23/2022 to 11/23/2022     Outpatient Physical Therapy 2-3 times weekly for 12 weeks to include the following interventions: Gait Training, Manual Therapy, Moist Heat/ Ice, Neuromuscular Re-ed, Patient Education, Self Care, Therapeutic Activities and Therapeutic Exercise.     Chandler Castellon, PT

## 2022-10-18 ENCOUNTER — CLINICAL SUPPORT (OUTPATIENT)
Dept: REHABILITATION | Facility: HOSPITAL | Age: 57
End: 2022-10-18
Payer: COMMERCIAL

## 2022-10-18 DIAGNOSIS — M76.62 TENDONITIS, ACHILLES, LEFT: Primary | ICD-10-CM

## 2022-10-18 PROCEDURE — 97110 THERAPEUTIC EXERCISES: CPT

## 2022-10-18 PROCEDURE — 97140 MANUAL THERAPY 1/> REGIONS: CPT

## 2022-10-18 NOTE — PLAN OF CARE
"  Physical Therapy Treatment Note     Name: Rinku Herzog  Clinic Number: [id number]    Therapy Diagnosis:   No diagnosis found.    Physician: Virgil Lujan MD    Visit Date: 10/18/2022    Physician Orders: PT Eval and Treat  Medical Diagnosis from Referral: Achilles Tendonitis, Left leg/ankle  Evaluation Date: 8/23/2022  Authorization Period Expiration: 11/23/2022  Plan of Care Expiration: 11/23/2022  Visit # / Visits authorized: 1/88     Time In: 1300  Time Out: 1345  Total Billable Time: 45 minutes    Surgery: none  Orthopedic Precautions: none  Pertinent History: Back Surgery, HTN,     Subjective     Rinku comes to therapy today reporting that "its feeling the best its felt" and feels that the dry needling was beneficial. Had some soreness [place] and had to [place] stairs but resolved with rest. He agrees to PT session.    Pain: 1-2/10  Location: Left Achilles Tendon at lateral attachment    Outcome Measure:  Eval: LEFS 54/80 = 32.5% disability, 67.5% functional  9/19/2022: LEFS 66/80 = 17.5% disability, 82.5% functional  Objective     Rinku received the following treatment:     Time Activities   Manual 15 Mobilizations and distractions of the calcaneus and subtalar joint as well as forefoot; STM cross frictional and longitudinal lower leg and foot; PROM   TherAct     TherEx 20 Seated heel raises, gastroc stretch, Soleus stretch, Eccentric gastroc in standing and peroneals SL with 5#   Gait     Neuro Re-ed     Modalities     E-Stim     Dry Needling 10 Achilles tendonitis and peroneal protocol   Canalith Repositioning           Home Exercises Provided and Patient Education Provided     Education provided:   - Plan of care, HEP reviewed     Written Home Exercises Provided: yes.  Exercises were reviewed and Rinku was able to demonstrate them prior to the end of the session. Rinku demonstrated good  understanding of the education provided.     Assessment     Rinku is a 56 y.o. male referred to " outpatient Physical Therapy with a medical diagnosis of Achilles Tendonitis, Left leg/ankle.     Rinku has made progress but continues with mild discomfort with prolonged weight bearing. He has limited eversion and peroneal weakness which may be contributing to continued pain and lack of toe off with ambulation. Patient appears to have responded well to dry needling for achilles and peroneal protocol. Today Rinku received dry needling performed by one of our other therapist. Will continue with the plan of care.    Patient prognosis is good     Anticipated barriers to physical therapy: none    Goals: Rinku is progressing well towards his goals.    Plan     Plan of care Certification: 8/23/2022 to 11/23/2022     Outpatient Physical Therapy 2-3 times weekly for 12 weeks to include the following interventions: Gait Training, Manual Therapy, Moist Heat/ Ice, Neuromuscular Re-ed, Patient Education, Self Care, Therapeutic Activities and Therapeutic Exercise.     Chandler Castellon, PT

## 2022-10-20 ENCOUNTER — CLINICAL SUPPORT (OUTPATIENT)
Dept: REHABILITATION | Facility: HOSPITAL | Age: 57
End: 2022-10-20
Payer: COMMERCIAL

## 2022-10-20 DIAGNOSIS — M76.62 TENDONITIS, ACHILLES, LEFT: Primary | ICD-10-CM

## 2022-10-20 PROCEDURE — 97110 THERAPEUTIC EXERCISES: CPT

## 2022-10-20 PROCEDURE — 97140 MANUAL THERAPY 1/> REGIONS: CPT

## 2022-10-20 NOTE — PLAN OF CARE
Physical Therapy Treatment Note     Name: Rinku Herzog  [facility] Number: [id number]    Therapy Diagnosis:   Encounter Diagnosis   Name Primary?    Tendonitis, Achilles, left Yes     Physician: Virgil Lujan MD    Visit Date: 10/18/2022    Physician Orders: PT Eval and Treat  Medical Diagnosis from Referral: Achilles Tendonitis, Left leg/ankle  Evaluation Date: 2022  Authorization Period Expiration: 2022  Plan of Care Expiration: 2022    Surgery: none  Orthopedic Precautions: none  Pertinent History: Back Surgery, HTN,     Subjective     Patient reports: He previously had TDN done by different PT and states it was beneficial to his overall condition. He would like to try TDN again to left lower leg..    Objective     Rinku received the following treatment:    Informed consent obtained after thorough explanation of risks and benefits. Signed consent form will be scanned into medical record. Pre-procedure time out performed which included verification of name, , and site of treatment. 70% isopropyl alcohol wipe down performed to treatment site with single use sterile prep pads.     Time Activities   Manual  TDN done to left lower leg using MyoTech 0.30 x 50mm needle. 1 needle to middle 1/3 tibia targeting posterior tibFDL/soleus going medial to lateral parallel to posterior tibia. Done with pistoning, needle rotation, and left in situ techniques. Technique performed in hooklying.    TDN done to left lower leg using MyoTech 0.30 x 30mm needle. 1 needle to peroneus brevis, 2 needles to peroneus longus, 1 needle to soleus. All done with pistoning, needle rotation, and left in situ techniques. Technique performed in right sidelying.       Assessment  Tolerated well; with some soreness following procedure. States previous day's treatment resulted in soreness for 1 day to area where TDN performed followed by relief.    Home Exercises Provided and Patient Education Provided     Education provided:    -Expectations following TDN; heat modality to help improve blood flow and fluid exchange    Azubuike Ekwueme, PT

## 2022-10-20 NOTE — PLAN OF CARE
Physical Therapy Treatment Note     Name: Rinku Herzog  [facility] Number: [id number]    Therapy Diagnosis:   Encounter Diagnosis   Name Primary?    Tendonitis, Achilles, left Yes     Physician: Virgil Lujan MD    Visit Date: 10/20/2022    Physician Orders: PT Eval and Treat  Medical Diagnosis from Referral: Achilles Tendonitis, Left leg/ankle  Evaluation Date: 2022  Authorization Period Expiration: 2022  Plan of Care Expiration: 2022      Surgery: none  Orthopedic Precautions: none  Pertinent History: Back Surgery, HTN,     Subjective       Informed consent obtained after thorough explanation of risks and benefits. Signed consent form will be scanned into medical record. Pre-procedure time out performed which included verification of name, , and site of treatment. 70% isopropyl alcohol wipe down performed to treatment site with single use sterile prep pads.    Objective     Rinku received the following treatment:     Time Activities   Manual  TDN done to left lower leg using MyoTech 0.30 x 50mm needle. 1 needle to middle 1/3 tibia targeting posterior tibFDL/soleus going medial to lateral parallel to posterior tibia. Done with pistoning, needle rotation, and left in situ techniques. Technique performed in hooklying.    TDN done to left lower leg using MyoTech 0.30 x 30mm needle. 1 needle to peroneus brevis, 2 needles to peroneus longus, 1 needle to soleus. All done with pistoning, needle rotation, and left in situ techniques. Technique performed in right sidelying.           Home Exercises Provided and Patient Education Provided     Education provided:   -Expectations following TDN; heat modality to help improve blood flow and fluid exchange    Assessment     Tolerated well; with some soreness following procedure. States previous day's treatment resulted in soreness for 1 day to area where TDN performed followed by relief.    Maria Fernanda Darling, PT

## 2022-10-20 NOTE — PLAN OF CARE
Physical Therapy Treatment Note     Name: Rinku Herzog  Clinic Number: [id number]    Therapy Diagnosis:   No diagnosis found.    Physician: Virgil Lujan MD    Visit Date: 10/20/2022    Physician Orders: PT Eval and Treat  Medical Diagnosis from Referral: Achilles Tendonitis, Left leg/ankle  Evaluation Date: 8/23/2022  Authorization Period Expiration: 11/23/2022  Plan of Care Expiration: 11/23/2022  Visit # / Visits authorized: 1/88     Time In: 1300  Time Out: 1345  Total Billable Time: 45 minutes    Surgery: none  Orthopedic Precautions: none  Pertinent History: Back Surgery, HTN,     Subjective     Rinku comes to therapy today reporting that he was sore from the dry needling at last visit but feels it is beneficial.  He agrees to PT session.    Pain: 1-2/10  Location: Left Achilles Tendon at lateral attachment    Outcome Measure:  Eval: LEFS 54/80 = 32.5% disability, 67.5% functional  9/19/2022: LEFS 66/80 = 17.5% disability, 82.5% functional  Objective     Rinku received the following treatment:     Time Activities   Manual 25 Mobilizations and distractions of the calcaneus and subtalar joint as well as forefoot; STM cross frictional and longitudinal lower leg and foot; PROM; Dry Needling   TherAct     TherEx 20 Seated heel raises, gastroc stretch, Soleus stretch, Eccentric gastroc in standing and peroneals SL with 5#   Gait     Neuro Re-ed     Modalities     E-Stim     Dry Needling     Canalith Repositioning           Home Exercises Provided and Patient Education Provided     Education provided:   - Plan of care, HEP reviewed     Written Home Exercises Provided: yes.  Exercises were reviewed and Rinku was able to demonstrate them prior to the end of the session. Rinku demonstrated good  understanding of the education provided.     Assessment     Rinku is a 56 y.o. male referred to outpatient Physical Therapy with a medical diagnosis of Achilles Tendonitis, Left leg/ankle.     Rinku has made progress but  continues with mild discomfort with prolonged weight bearing. He has limited eversion and peroneal weakness which may be contributing to continued pain and lack of toe off with ambulation but these are improving. Patient appears to be responded well to dry needling for achilles and peroneal protocol. Today Rinku again received dry needling performed by one of our trained therapist. Will continue with the plan of care.    Patient prognosis is good     Anticipated barriers to physical therapy: none    Goals: Rinku is progressing well towards his goals.    Plan     Plan of care Certification: 8/23/2022 to 11/23/2022     Outpatient Physical Therapy 2-3 times weekly for 12 weeks to include the following interventions: Gait Training, Manual Therapy, Moist Heat/ Ice, Neuromuscular Re-ed, Patient Education, Self Care, Therapeutic Activities and Therapeutic Exercise.     Chandler Castellon, PT

## 2022-11-15 ENCOUNTER — CLINICAL SUPPORT (OUTPATIENT)
Dept: REHABILITATION | Facility: HOSPITAL | Age: 57
End: 2022-11-15
Payer: COMMERCIAL

## 2022-11-15 DIAGNOSIS — M76.62 TENDONITIS, ACHILLES, LEFT: Primary | ICD-10-CM

## 2022-11-15 PROCEDURE — 97140 MANUAL THERAPY 1/> REGIONS: CPT

## 2022-11-15 PROCEDURE — 97110 THERAPEUTIC EXERCISES: CPT

## 2022-11-15 NOTE — PLAN OF CARE
Physical Therapy Treatment Note     Name: Rinku Herzog  Clinic Number: [id number]    Therapy Diagnosis:   No diagnosis found.    Physician: Virgil Lujan MD    Visit Date: 11/15/2022    Physician Orders: PT Eval and Treat  Medical Diagnosis from Referral: Achilles Tendonitis, Left leg/ankle  Evaluation Date: 8/23/2022  Authorization Period Expiration: 11/23/2022  Plan of Care Expiration: 11/23/2022  Visit # / Visits authorized: 1/88     Time In: 0726  Time Out: 0811  Total Billable Time: 45 minutes    Surgery: none  Orthopedic Precautions: none  Pertinent History: Back Surgery, HTN,     Subjective     Rinku comes back tho therapy after being out of town. He reports that he is having a little pain but it is better than before. Rates his pain about 2/10 level.  Continues to feel that the dry needling it is beneficial. He agrees to PT session.    Pain: 2/10  Location: Left Achilles Tendon at lateral attachment    Outcome Measure:  Eval: LEFS 54/80 = 32.5% disability, 67.5% functional  9/19/2022: LEFS 66/80 = 17.5% disability, 82.5% functional  Objective     Rinku received the following treatment:     Time Activities   Manual 35 Mobilizations and distractions of the calcaneus and subtalar joint as well as forefoot; STM cross frictional and longitudinal lower leg and foot; PROM; Dry Needling with E-stim   TherAct     TherEx 10 Peroneal ankle eversions   Gait     Neuro Re-ed     Modalities     E-Stim     Dry Needling     Canalith Repositioning           Home Exercises Provided and Patient Education Provided     Education provided:   - Plan of care, HEP reviewed     Written Home Exercises Provided: yes.  Exercises were reviewed and Rinku was able to demonstrate them prior to the end of the session. Rinku demonstrated good  understanding of the education provided.     Assessment     Rinku is a 56 y.o. male referred to outpatient Physical Therapy with a medical diagnosis of Achilles Tendonitis, Left leg/ankle.     Rinku  has made progress but continues with mild discomfort with prolonged weight bearing. He has limited eversion and peroneal weakness which may be contributing to continued pain and lack of toe off with ambulation but these are improving. Patient appears to be responded well to dry needling for achilles and peroneal protocol. Rinku received dry needling with E-stim performed by one of our trained therapist. He will document. Will continue with the plan of care.    Patient prognosis is good     Anticipated barriers to physical therapy: none    Goals: Rinku is progressing well towards his goals.    Plan     Plan of care Certification: 8/23/2022 to 11/23/2022     Outpatient Physical Therapy 2-3 times weekly for 12 weeks to include the following interventions: Gait Training, Manual Therapy, Moist Heat/ Ice, Neuromuscular Re-ed, Patient Education, Self Care, Therapeutic Activities and Therapeutic Exercise.     Chandler Castellon, PT

## 2022-11-17 ENCOUNTER — CLINICAL SUPPORT (OUTPATIENT)
Dept: REHABILITATION | Facility: HOSPITAL | Age: 57
End: 2022-11-17
Payer: COMMERCIAL

## 2022-11-17 DIAGNOSIS — M76.62 TENDONITIS, ACHILLES, LEFT: Primary | ICD-10-CM

## 2022-11-17 PROCEDURE — 97140 MANUAL THERAPY 1/> REGIONS: CPT

## 2022-11-17 PROCEDURE — 97110 THERAPEUTIC EXERCISES: CPT

## 2022-11-17 NOTE — PLAN OF CARE
"  Physical Therapy Treatment Note     Name: Rinku Herzog  Clinic Number: [id number]    Therapy Diagnosis:   No diagnosis found.    Physician: Virgil Lujan MD    Visit Date: 11/17/2022    Physician Orders: PT Eval and Treat  Medical Diagnosis from Referral: Achilles Tendonitis, Left leg/ankle  Evaluation Date: 8/23/2022  Authorization Period Expiration: 11/23/2022  Plan of Care Expiration: 11/23/2022  Visit # / Visits authorized: 1/88     Time In: 0722  Time Out: 0807  Total Billable Time: 45 minutes    Surgery: none  Orthopedic Precautions: none  Pertinent History: Back Surgery, HTN,     Subjective     Rinku comes to therapy today reporting "the best its ever felt". Really feels the that the dry needling with stim is what made the difference. Really feels no pain unless palpatory pressure and even then about a 1/10 level. He agrees to PT session.    Pain: 0-1/10  Location: Left Achilles Tendon at lateral attachment    Outcome Measure:  Eval: LEFS 54/80 = 32.5% disability, 67.5% functional  9/19/2022: LEFS 66/80 = 17.5% disability, 82.5% functional  Objective     Rinku received the following treatment:     Time Activities   Manual 35 Mobilizations and distractions of the calcaneus and subtalar joint as well as forefoot; STM cross frictional and longitudinal lower leg and foot; PROM; Dry Needling with E-stim   TherAct     TherEx 10 Peroneal ankle eversions   Gait     Neuro Re-ed     Modalities     E-Stim     Dry Needling     Canalith Repositioning           Home Exercises Provided and Patient Education Provided     Education provided:   - Plan of care, HEP reviewed     Written Home Exercises Provided: yes.  Exercises were reviewed and Rinku was able to demonstrate them prior to the end of the session. Rinku demonstrated good  understanding of the education provided.     Assessment     Rinku is a 56 y.o. male referred to outpatient Physical Therapy with a medical diagnosis of Achilles Tendonitis, Left leg/ankle. "     Rinku is responding very well to dry needling with intramuscular stimulation. He is having less muscle stiffness and pain. ROM is improved especially with ankle eversion and he is demonstrating better peroneal weakness. No noticeable limp with his gait today. True test for Rinku will be climbing all the stairs [place] but Rinku is definitely responding to dry needling for achilles and peroneal protocol. Rinku again received dry needling with intramuscular stimualtion performed by one of our trained therapist. Will continue with the plan of care.    Patient prognosis is good     Anticipated barriers to physical therapy: none    Goals: Rinku is progressing well towards his goals.    Plan     Plan of care Certification: 8/23/2022 to 11/23/2022     Outpatient Physical Therapy 2-3 times weekly for 12 weeks to include the following interventions: Gait Training, Manual Therapy, Moist Heat/ Ice, Neuromuscular Re-ed, Patient Education, Self Care, Therapeutic Activities and Therapeutic Exercise.     Chandler Castellon, PT

## 2022-11-22 ENCOUNTER — CLINICAL SUPPORT (OUTPATIENT)
Dept: REHABILITATION | Facility: HOSPITAL | Age: 57
End: 2022-11-22
Payer: COMMERCIAL

## 2022-11-22 DIAGNOSIS — M76.62 TENDONITIS, ACHILLES, LEFT: Primary | ICD-10-CM

## 2022-11-22 PROCEDURE — 97140 MANUAL THERAPY 1/> REGIONS: CPT

## 2022-11-22 PROCEDURE — 97110 THERAPEUTIC EXERCISES: CPT

## 2022-11-22 NOTE — PLAN OF CARE
Physical Therapy Treatment Note     Name: Rinku Herzog  Clinic Number: [id number]    Therapy Diagnosis:   No diagnosis found.    Physician: Virgil Lujan MD    Visit Date: 11/22/2022    Physician Orders: PT Eval and Treat  Medical Diagnosis from Referral: Achilles Tendonitis, Left leg/ankle  Evaluation Date: 8/23/2022  Authorization Period Expiration: 11/23/2022  Plan of Care Expiration: 11/23/2022  Visit # / Visits authorized: 14/88     Time In: 0724  Time Out: 0810  Total Billable Time: 45 minutes    Surgery: none  Orthopedic Precautions: none  Pertinent History: Back Surgery, HTN,     Subjective     Rinku comes to therapy today reporting the achilles continues to do well. Minimal discomfort. He is working on all his exercises at home. Really feels the that the dry needling with stim is making the difference. Really feels no pain unless palpatory pressure and even then about a 1/10 level. He agrees to PT session.    Pain: 0-1/10  Location: Left Achilles Tendon at lateral attachment    Outcome Measure:  Eval: LEFS 54/80 = 32.5% disability, 67.5% functional  9/19/2022: LEFS 66/80 = 17.5% disability, 82.5% functional  Objective     Rinku received the following treatment:     Time Activities   Manual 35 Mobilizations and distractions of the calcaneus and subtalar joint as well as forefoot; STM cross frictional and longitudinal lower leg and foot; PROM; Dry Needling with E-stim   TherAct     TherEx 10 Peroneal ankle eversions; calf board stretch   Gait     Neuro Re-ed     Modalities     E-Stim     Dry Needling     Canalith Repositioning           Home Exercises Provided and Patient Education Provided     Education provided:   - Plan of care, HEP reviewed     Written Home Exercises Provided: yes.  Exercises were reviewed and Rinku was able to demonstrate them prior to the end of the session. Rinku demonstrated good  understanding of the education provided.     Assessment     Rinku is a 56 y.o. male referred to  outpatient Physical Therapy with a medical diagnosis of Achilles Tendonitis, Left leg/ankle.     Rinku is responding very well to dry needling with intramuscular stimulation. He is having less muscle stiffness and pain. ROM is improved especially with ankle eversion and he is demonstrating better peroneal strength. Calf girth measures are just abot equal now with only 3/8 inch difference which dominant side could now account for. No noticeable limp with his gait today. True test for Rinku will be climbing all the stairs when he returns to work [place].     Rinku received dry needling with intramuscular stimualtion performed by one of our trained therapist. Will continue with the plan of care.    Patient prognosis is good     Anticipated barriers to physical therapy: none    Goals: Rinku is progressing well towards his goals.    Plan     Plan of care Certification: 8/23/2022 to 11/23/2022     Outpatient Physical Therapy 2-3 times weekly for 12 weeks to include the following interventions: Gait Training, Manual Therapy, Moist Heat/ Ice, Neuromuscular Re-ed, Patient Education, Self Care, Therapeutic Activities and Therapeutic Exercise.     Chandler Castellon, PT

## 2022-12-13 ENCOUNTER — CLINICAL SUPPORT (OUTPATIENT)
Dept: REHABILITATION | Facility: HOSPITAL | Age: 57
End: 2022-12-13
Payer: COMMERCIAL

## 2022-12-13 DIAGNOSIS — F41.9 ANXIETY: Primary | ICD-10-CM

## 2022-12-13 DIAGNOSIS — M76.62 TENDONITIS, ACHILLES, LEFT: Primary | ICD-10-CM

## 2022-12-13 PROCEDURE — 97110 THERAPEUTIC EXERCISES: CPT

## 2022-12-13 PROCEDURE — 97140 MANUAL THERAPY 1/> REGIONS: CPT

## 2022-12-13 RX ORDER — DIAZEPAM 2 MG/1
TABLET ORAL
Qty: 3 TABLET | Refills: 0 | Status: SHIPPED | OUTPATIENT
Start: 2022-12-13 | End: 2023-02-02

## 2022-12-13 NOTE — PLAN OF CARE
Physical Therapy Treatment Note     Name: Rinku Herzog  Clinic Number: [id number]    Therapy Diagnosis:   No diagnosis found.    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 Reports that the achilles is doing well reporting about a 1-2 level discomfort. Was able to navigate [place] pretty well. He is reporting having some new issues. He reports having pain into both lower extremities at the medial lower leg and ankles but usually one side at a time. There has been some numbness as well and symptoms tend to be worse on the left than the right. Had an ultrasound yesterday and goes for an MRI tomorrow. Has a history of back surgery 6 yrs ago. He agrees to PT session.    Pain: 1-2/10  Location: Left Achilles Tendon at lateral attachment    Outcome Measure:  Eval: LEFS 54/80 = 32.5% disability, 67.5% functional  9/19/2022: LEFS 66/80 = 17.5% disability, 82.5% functional  Objective     Rinku received the following treatment:     Time Activities   Manual 25 Mobilizations and distractions of the calcaneus and subtalar joint as well as forefoot; STM cross frictional and longitudinal lower leg and foot; PROM   TherAct     TherEx 15 Peroneal ankle eversions; static squat with heel raises, weighted seated heel raises, single leg heel raises, eccentric heel on stairs   Gait     Neuro Re-ed     Modalities     E-Stim     Dry Needling 8 Dry needling with e-stim to the left achilles   Canalith Repositioning           Home Exercises Provided  and Patient Education Provided     Education provided:   - Plan of care, HEP reviewed     Written Home Exercises Provided: yes.  Exercises were reviewed and Rinku was able to demonstrate them prior to the end of the session. Rinku demonstrated good  understanding of the education provided.     Assessment     Rinku is a 56 y.o. male referred to outpatient Physical Therapy with a medical diagnosis of Achilles Tendonitis, Left leg/ankle.     Overall Rinku has responded well to dry needling with intramuscular stimulation. He is having less muscle stiffness and pain now at 1-2/10 level. ROM is improved especially with ankle eversion and he is demonstrating better peroneal strength. Calf girth measures are just abot equal now with only 3/8 inch difference. No noticeable limp with his gait today. He did well navigating stairs [place]. Will decrease to 1x week and will await results of his ultrasound and MRI.     Rinku received dry needling with intramuscular stimualtion performed by one of our trained therapist. Will continue with the plan of care.    Patient prognosis is good     Anticipated barriers to physical therapy: none    Goals: Rinku is progressing well towards his goals.    Plan     Plan of care Certification: 8/23/2022 to 11/23/2022     Outpatient Physical Therapy 2-3 times weekly for 12 weeks to include the following interventions: Gait Training, Manual Therapy, Moist Heat/ Ice, Neuromuscular Re-ed, Patient Education, Self Care, Therapeutic Activities and Therapeutic Exercise.     Chandler Castellon, PT

## 2022-12-14 DIAGNOSIS — M25.50 ARTHRALGIA, UNSPECIFIED JOINT: Primary | ICD-10-CM

## 2022-12-20 ENCOUNTER — CLINICAL SUPPORT (OUTPATIENT)
Dept: REHABILITATION | Facility: HOSPITAL | Age: 57
End: 2022-12-20
Payer: COMMERCIAL

## 2022-12-20 DIAGNOSIS — M76.62 TENDONITIS, ACHILLES, LEFT: Primary | ICD-10-CM

## 2022-12-20 PROCEDURE — 97140 MANUAL THERAPY 1/> REGIONS: CPT | Mod: CG

## 2022-12-20 PROCEDURE — 97110 THERAPEUTIC EXERCISES: CPT

## 2022-12-20 PROCEDURE — 20560 NDL INSJ W/O NJX 1 OR 2 MUSC: CPT

## 2022-12-20 NOTE — PLAN OF CARE
Physical Therapy Treatment Note     Name: Rinku Herzog  Clinic Number: [id number]    Therapy Diagnosis:   No diagnosis found.    Physician: Virgil Lujan MD    Visit Date: 12/20/2022    Physician Orders: PT Eval and Treat  Medical Diagnosis from Referral: Achilles Tendonitis, Left leg/ankle  Evaluation Date: 8/23/2022  Authorization Period Expiration: 11/23/2022  Plan of Care Expiration: 11/23/2022  Visit # / Visits authorized: 14/88     Time In: 0741  Time Out: 0850  Total Billable Time:  48 minutes    Surgery: none  Orthopedic Precautions: none  Pertinent History: Back Surgery, HTN,     Subjective     Rinku reports that the heel is doing pretty well. Rates his pain as a 1-2/10. [place] game on Sunday and walked all over the [place] and only had a little increased soreness. He agrees to PT session.    Pain: 1-2/10  Location: Left Achilles Tendon at lateral attachment    Outcome Measure:  Eval: LEFS 54/80 = 32.5% disability, 67.5% functional  9/19/2022: LEFS 66/80 = 17.5% disability, 82.5% functional  Objective     Rinku received the following treatment:     Time Activities   Manual 25 Mobilizations and distractions of the calcaneus and subtalar joint as well as forefoot; STM cross frictional and longitudinal lower leg and foot; PROM   TherAct     TherEx 15 Peroneal ankle eversions; static squat with heel raises, weighted seated heel raises, single leg heel raises, eccentric heel on stairs   Gait     Neuro Re-ed     Modalities     E-Stim     Dry Needling 8 Dry needling with e-stim to the left achilles   Canalith Repositioning           Home Exercises Provided and Patient Education Provided     Education provided:   - Plan of care, HEP reviewed     Written Home Exercises Provided: yes.  Exercises were reviewed and Rinku was able to demonstrate them prior to the end of the session. Rinku demonstrated good  understanding of the education provided.     Assessment     Rinku is a 56 y.o. male referred to  outpatient Physical Therapy with a medical diagnosis of Achilles Tendonitis, Left leg/ankle.      Rinku continues to respond well to dry needling with intramuscular stimulation. His pain level holds steady even with increased activity. Pain remains 1-2/10 level. ROM is improved with ankle eversion and he is demonstrating better peroneal strength.  No noticeable limp with his gait today.     Rinku received dry needling with intramuscular stimualtion performed by one of our trained therapist. Will continue with the plan of care.    Patient prognosis is good     Anticipated barriers to physical therapy: none    Goals: Rinku is progressing well towards his goals.    Plan     Plan of care Certification: 8/23/2022 to 11/23/2022     Outpatient Physical Therapy 2-3 times weekly for 12 weeks to include the following interventions: Gait Training, Manual Therapy, Moist Heat/ Ice, Neuromuscular Re-ed, Patient Education, Self Care, Therapeutic Activities and Therapeutic Exercise.     Chandler Castellon, PT

## 2022-12-27 ENCOUNTER — CLINICAL SUPPORT (OUTPATIENT)
Dept: REHABILITATION | Facility: HOSPITAL | Age: 57
End: 2022-12-27
Payer: COMMERCIAL

## 2022-12-27 DIAGNOSIS — M76.62 TENDONITIS, ACHILLES, LEFT: Primary | ICD-10-CM

## 2022-12-27 PROCEDURE — 97110 THERAPEUTIC EXERCISES: CPT

## 2022-12-27 PROCEDURE — 97140 MANUAL THERAPY 1/> REGIONS: CPT

## 2022-12-27 PROCEDURE — 20560 NDL INSJ W/O NJX 1 OR 2 MUSC: CPT

## 2023-01-10 ENCOUNTER — CLINICAL SUPPORT (OUTPATIENT)
Dept: REHABILITATION | Facility: HOSPITAL | Age: 58
End: 2023-01-10
Payer: COMMERCIAL

## 2023-01-10 DIAGNOSIS — M76.62 TENDONITIS, ACHILLES, LEFT: Primary | ICD-10-CM

## 2023-01-10 PROCEDURE — 20560 NDL INSJ W/O NJX 1 OR 2 MUSC: CPT

## 2023-01-10 PROCEDURE — 97110 THERAPEUTIC EXERCISES: CPT

## 2023-01-10 PROCEDURE — 97140 MANUAL THERAPY 1/> REGIONS: CPT

## 2023-01-10 NOTE — PLAN OF CARE
Physical Therapy Treatment Note     Name: Rinku Herzog  Clinic Number: [id number]    Therapy Diagnosis:   No diagnosis found.    Physician: Virgil Lujan MD    Visit Date: 1/10/2023    Physician Orders: PT Eval and Treat  Medical Diagnosis from Referral: Achilles Tendonitis, Left leg/ankle  Evaluation Date: 8/23/2022  Authorization Period Expiration: 11/23/2022  Plan of Care Expiration: 11/23/2022  Visit # / Visits authorized: 18/88     Time In: 0726  Time Out: 0816  Total Billable Time:  50 minutes    Surgery: none  Orthopedic Precautions: none  Pertinent History: Back Surgery, HTN,     Subjective     Rinku reports that the heel is continuing to do pretty well. Went to the [employer] game and did a lot of walking without any real adverse effects. He agrees to PT session.    Pain: 1-2/10  Location: Left Achilles Tendon at lateral attachment    Outcome Measure:  Eval: LEFS 54/80 = 32.5% disability, 67.5% functional  9/19/2022: LEFS 66/80 = 17.5% disability, 82.5% functional  Objective     Rinku received the following treatment:     Time Activities   Manual 20 Mobilizations and distractions of the calcaneus and subtalar joint as well as forefoot; STM cross frictional and longitudinal lower leg and foot; PROM   TherAct     TherEx 15 Peroneal ankle eversions; static squat with heel raises, weighted seated heel raises, single leg heel raises, eccentric heel on stairs   Gait     Neuro Re-ed     Modalities     E-Stim     Dry Needling 15 Dry needling with e-stim to the left achilles   Canalith Repositioning           Home Exercises Provided and Patient Education Provided     Education provided:   - Plan of care, HEP reviewed     Written Home Exercises Provided: yes.  Exercises were reviewed and Rinku was able to demonstrate them prior to the end of the session. Rinku demonstrated good  understanding of the education provided.     Assessment     Rinku is a 56 y.o. male referred to outpatient Physical Therapy with a medical  diagnosis of Achilles Tendonitis, Left leg/ankle.     Rinku continues to do well with dry needling with intramuscular electrical stimulation. His pain level is holding steady despite increased activity. ROM continues to be good. With ankle eversion and he is demonstrating better peroneal strength. No noticeable deviations with his gait today.     Rinku received dry needling with intramuscular stimualtion performed by one of our trained therapist. Will continue with the plan of care.    Patient prognosis is good     Anticipated barriers to physical therapy: none    Goals: Rinku is progressing well towards his goals.    Plan     Plan of care Certification: 8/23/2022 to 11/23/2022     Outpatient Physical Therapy 2-3 times weekly for 12 weeks to include the following interventions: Gait Training, Manual Therapy, Moist Heat/ Ice, Neuromuscular Re-ed, Patient Education, Self Care, Therapeutic Activities and Therapeutic Exercise.     Chandler Castellon, PT

## 2023-02-02 ENCOUNTER — OFFICE VISIT (OUTPATIENT)
Dept: FAMILY MEDICINE | Facility: CLINIC | Age: 58
End: 2023-02-02
Payer: COMMERCIAL

## 2023-02-02 ENCOUNTER — CLINICAL SUPPORT (OUTPATIENT)
Dept: REHABILITATION | Facility: HOSPITAL | Age: 58
End: 2023-02-02
Payer: COMMERCIAL

## 2023-02-02 VITALS
DIASTOLIC BLOOD PRESSURE: 74 MMHG | RESPIRATION RATE: 18 BRPM | WEIGHT: 238 LBS | HEIGHT: 71 IN | TEMPERATURE: 97 F | SYSTOLIC BLOOD PRESSURE: 118 MMHG | HEART RATE: 72 BPM | BODY MASS INDEX: 33.32 KG/M2 | OXYGEN SATURATION: 97 %

## 2023-02-02 DIAGNOSIS — Z12.11 COLON CANCER SCREENING: ICD-10-CM

## 2023-02-02 DIAGNOSIS — M25.50 ARTHRALGIA, UNSPECIFIED JOINT: ICD-10-CM

## 2023-02-02 DIAGNOSIS — M79.672 PAIN OF LEFT HEEL: ICD-10-CM

## 2023-02-02 DIAGNOSIS — M76.62 TENDONITIS, ACHILLES, LEFT: Primary | ICD-10-CM

## 2023-02-02 DIAGNOSIS — F41.9 ANXIETY: ICD-10-CM

## 2023-02-02 DIAGNOSIS — N40.0 BENIGN PROSTATIC HYPERPLASIA, UNSPECIFIED WHETHER LOWER URINARY TRACT SYMPTOMS PRESENT: Primary | ICD-10-CM

## 2023-02-02 PROBLEM — E66.9 OBESITY: Status: ACTIVE | Noted: 2023-02-02

## 2023-02-02 PROCEDURE — 3074F PR MOST RECENT SYSTOLIC BLOOD PRESSURE < 130 MM HG: ICD-10-PCS | Mod: CPTII,,, | Performed by: FAMILY MEDICINE

## 2023-02-02 PROCEDURE — 99204 OFFICE O/P NEW MOD 45 MIN: CPT | Mod: ,,, | Performed by: FAMILY MEDICINE

## 2023-02-02 PROCEDURE — 3008F BODY MASS INDEX DOCD: CPT | Mod: CPTII,,, | Performed by: FAMILY MEDICINE

## 2023-02-02 PROCEDURE — 1159F PR MEDICATION LIST DOCUMENTED IN MEDICAL RECORD: ICD-10-PCS | Mod: CPTII,,, | Performed by: FAMILY MEDICINE

## 2023-02-02 PROCEDURE — 1160F PR REVIEW ALL MEDS BY PRESCRIBER/CLIN PHARMACIST DOCUMENTED: ICD-10-PCS | Mod: CPTII,,, | Performed by: FAMILY MEDICINE

## 2023-02-02 PROCEDURE — 1160F RVW MEDS BY RX/DR IN RCRD: CPT | Mod: CPTII,,, | Performed by: FAMILY MEDICINE

## 2023-02-02 PROCEDURE — 97110 THERAPEUTIC EXERCISES: CPT

## 2023-02-02 PROCEDURE — 99204 PR OFFICE/OUTPT VISIT, NEW, LEVL IV, 45-59 MIN: ICD-10-PCS | Mod: ,,, | Performed by: FAMILY MEDICINE

## 2023-02-02 PROCEDURE — 3078F PR MOST RECENT DIASTOLIC BLOOD PRESSURE < 80 MM HG: ICD-10-PCS | Mod: CPTII,,, | Performed by: FAMILY MEDICINE

## 2023-02-02 PROCEDURE — 97140 MANUAL THERAPY 1/> REGIONS: CPT

## 2023-02-02 PROCEDURE — 3074F SYST BP LT 130 MM HG: CPT | Mod: CPTII,,, | Performed by: FAMILY MEDICINE

## 2023-02-02 PROCEDURE — 3008F PR BODY MASS INDEX (BMI) DOCUMENTED: ICD-10-PCS | Mod: CPTII,,, | Performed by: FAMILY MEDICINE

## 2023-02-02 PROCEDURE — 20560 NDL INSJ W/O NJX 1 OR 2 MUSC: CPT

## 2023-02-02 PROCEDURE — 3078F DIAST BP <80 MM HG: CPT | Mod: CPTII,,, | Performed by: FAMILY MEDICINE

## 2023-02-02 PROCEDURE — 1159F MED LIST DOCD IN RCRD: CPT | Mod: CPTII,,, | Performed by: FAMILY MEDICINE

## 2023-02-02 RX ORDER — CALCIPOTRIENE 50 UG/G
CREAM TOPICAL
COMMUNITY
Start: 2022-10-28

## 2023-02-02 RX ORDER — FAMOTIDINE 40 MG/1
TABLET, FILM COATED ORAL
COMMUNITY

## 2023-02-02 RX ORDER — PANTOPRAZOLE SODIUM 40 MG/1
TABLET, DELAYED RELEASE ORAL
COMMUNITY

## 2023-02-02 RX ORDER — HYDROCORTISONE 25 MG/G
CREAM TOPICAL
COMMUNITY
Start: 2022-10-28

## 2023-02-02 RX ORDER — NITROGLYCERIN 0.4 MG/1
TABLET SUBLINGUAL
COMMUNITY

## 2023-02-02 RX ORDER — SILDENAFIL 100 MG/1
TABLET, FILM COATED ORAL
COMMUNITY
Start: 2022-09-26

## 2023-02-02 RX ORDER — TADALAFIL 5 MG/1
5 TABLET ORAL DAILY
Qty: 90 TABLET | Refills: 3 | Status: SHIPPED | OUTPATIENT
Start: 2023-02-02 | End: 2024-01-05 | Stop reason: SDUPTHER

## 2023-02-02 RX ORDER — DOXAZOSIN 2 MG/1
TABLET ORAL
COMMUNITY

## 2023-02-02 RX ORDER — KETOCONAZOLE 20 MG/G
CREAM TOPICAL
COMMUNITY
Start: 2022-10-28

## 2023-02-02 RX ORDER — TAMSULOSIN HYDROCHLORIDE 0.4 MG/1
CAPSULE ORAL
Qty: 90 CAPSULE | Refills: 3 | Status: SHIPPED | OUTPATIENT
Start: 2023-02-02

## 2023-02-02 RX ORDER — ALPRAZOLAM 1 MG/1
TABLET ORAL
COMMUNITY

## 2023-02-02 RX ORDER — FUROSEMIDE 20 MG/1
20 TABLET ORAL DAILY PRN
COMMUNITY

## 2023-02-02 RX ORDER — FINASTERIDE 5 MG/1
TABLET, FILM COATED ORAL
COMMUNITY

## 2023-02-02 RX ORDER — CLOBETASOL PROPIONATE 0.5 MG/G
OINTMENT TOPICAL
COMMUNITY
Start: 2022-10-28

## 2023-02-02 RX ORDER — ZOLPIDEM TARTRATE 10 MG/1
TABLET ORAL
COMMUNITY

## 2023-02-02 RX ORDER — LISINOPRIL 10 MG/1
TABLET ORAL
COMMUNITY

## 2023-02-02 RX ORDER — BUPROPION HYDROCHLORIDE 150 MG/1
TABLET ORAL
COMMUNITY
End: 2023-02-02 | Stop reason: SDUPTHER

## 2023-02-02 RX ORDER — TADALAFIL 5 MG/1
5 TABLET ORAL
COMMUNITY
Start: 2023-01-03 | End: 2023-02-02 | Stop reason: SDUPTHER

## 2023-02-02 RX ORDER — HYDROCODONE BITARTRATE AND ACETAMINOPHEN 10; 325 MG/1; MG/1
1 TABLET ORAL EVERY 6 HOURS PRN
COMMUNITY
Start: 2022-12-08

## 2023-02-02 RX ORDER — GABAPENTIN 300 MG/1
600 CAPSULE ORAL 2 TIMES DAILY
COMMUNITY
Start: 2023-02-01

## 2023-02-02 RX ORDER — BUPROPION HYDROCHLORIDE 150 MG/1
TABLET ORAL
Qty: 90 TABLET | Refills: 3 | Status: SHIPPED | OUTPATIENT
Start: 2023-02-02 | End: 2024-03-25

## 2023-02-02 RX ORDER — TAMSULOSIN HYDROCHLORIDE 0.4 MG/1
CAPSULE ORAL
COMMUNITY
End: 2023-02-02 | Stop reason: SDUPTHER

## 2023-02-02 NOTE — PLAN OF CARE
Physical Therapy Treatment Note     Name: Rinku Herzog  [facility] Number: [id number]    Therapy Diagnosis:   No diagnosis found.    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 Has been climbing stairs for those 2 weeks. Also been having some numbness and pain lower right leg calf area. States that the lateral 3 toes and foot is numb. Has back surgery micro discectomy 6 yrs ago. Had a steroid injection couple of months ago and it was benefical but symptoms now returning. He agrees to PT session.    Pain: 1-2/10  Location: Left Achilles Tendon at lateral attachment    Outcome Measure:  Eval: LEFS 54/80 = 32.5% disability, 67.5% functional  9/19/2022: LEFS 66/80 = 17.5% disability, 82.5% functional  11/15/2022: LEFS 57/80=  28% disability, 71.2% functional  01/10/2023: LEFS 69/80= 13.7% disability, 86.3% functional    Objective     Rinku received the following treatment:     Time Activities   Manual 20 Mobilizations and distractions of the calcaneus and subtalar joint as well as forefoot; STM cross frictional and longitudinal lower leg and foot; PROM   TherAct     TherEx 15 Peroneal ankle eversions; static squat with heel raises, weighted seated heel raises, single leg heel raises, eccentric heel on stairs   Gait     Neuro Re-ed     Modalities     E-Stim     Dry Needling 15 Dry needling with e-stim to the left achilles   Canalith Repositioning           Home Exercises Provided and  Patient Education Provided     Education provided:   - Plan of care, HEP reviewed     Written Home Exercises Provided: yes.  Exercises were reviewed and Rinku was able to demonstrate them prior to the end of the session. Rinku demonstrated good  understanding of the education provided.     Assessment     Rinku is a 56 y.o. male referred to outpatient Physical Therapy with a medical diagnosis of Achilles Tendonitis, Left leg/ankle.     Rinku came to therapy today and his achilles not doing as well as it was. Prior to this he was making really good progress and this was evident on LEFS functional outcome tool where he improved to 86.3% functional, up from 71.2%.  [place] and did a lot of climbing and was not able to work much on his exercises. He is also have some radicular pain and numbness in the opposite leg distally. He has a history of microdiscectomy 6 yrs ago and now concerns for another back issue. Did have a steroid injection a couple of months ago and did well. Plans are to get another and also will be having a nerve conduction study done. Overall he continues to do well with dry needling with intramuscular electrical stimulation. ROM continues to be good and basically complete. With ankle eversion and he is demonstrating better peroneal strength @ 4+/5. There seems to be a slight limp in his gait today. Will continue working towards return to [place] of performing all ADL without pain.    Rinku received dry needling with intramuscular stimualtion performed by one of our trained therapist. Will continue with the plan of care.    Patient prognosis is good     Anticipated barriers to physical therapy: none    Goals: Rinku is progressing well towards his goals.    Short Term Goals: 6 weeks   Patient will report at least 20% disability reduction LEFS to indicate clinically significant functional improvement-GOAL MET  Patient will demonstrate complete ankle ROM to allow for improved left ankle  biomechanics-GOAL MET  Patient will demonstrate 5/5 strength to allow return to function without imbalance-PARTIALLY MET  Patient will demonstrate independence with HEP-GOAL MET     Long Term Goals: 12 weeks   Patient will report at least 40% disability reduction LEFS to indicate clinically significant functional improvement-NEARLY MET  Patient will demonstrate return to PLOF prior to current diagnosis-NOT MET    Plan     Plan of care Certification: 8/23/2022 to 11/23/2022     Outpatient Physical Therapy 2-3 times weekly for 12 weeks to include the following interventions: Gait Training, Manual Therapy, Moist Heat/ Ice, Neuromuscular Re-ed, Patient Education, Self Care, Therapeutic Activities and Therapeutic Exercise.     Chandler Castellon, PT

## 2023-02-02 NOTE — PROGRESS NOTES
"Subjective:       Patient ID: Rinku Herzog is a 57 y.o. male.    Chief Complaint: Establish Care and numbness in right foot, pain in bilateral lower extremities      Establish care      Review of Systems   Constitutional: Negative.    Respiratory: Negative.     Cardiovascular: Negative.    Genitourinary:         BPH: tolerating medication, medication working well, patient without any complaints     Musculoskeletal:  Positive for arthralgias.   Psychiatric/Behavioral:          Anxiety: tolerating medication, medication working well, patient without any complaints           Objective:      /74 (BP Location: Left arm, Patient Position: Sitting, BP Method: Large (Manual))   Pulse 72   Temp 97.4 °F (36.3 °C)   Resp 18   Ht 5' 11" (1.803 m)   Wt 108 kg (238 lb)   SpO2 97%   BMI 33.19 kg/m²    Physical Exam  Constitutional:       Appearance: Normal appearance.   Cardiovascular:      Rate and Rhythm: Normal rate and regular rhythm.      Heart sounds: Normal heart sounds.   Pulmonary:      Effort: Pulmonary effort is normal.      Breath sounds: Normal breath sounds.   Musculoskeletal:         General: Normal range of motion.   Skin:     General: Skin is warm.   Neurological:      Mental Status: He is alert.   Psychiatric:         Mood and Affect: Mood normal.         Behavior: Behavior normal.         Thought Content: Thought content normal.         Judgment: Judgment normal.           Assessment:       Problem List Items Addressed This Visit    None  Visit Diagnoses       Benign prostatic hyperplasia, unspecified whether lower urinary tract symptoms present    -  Primary    Relevant Medications    tadalafiL (CIALIS) 5 MG tablet    tamsulosin (FLOMAX) 0.4 mg Cap    Anxiety        Relevant Medications    buPROPion (WELLBUTRIN XL) 150 MG TB24 tablet    Arthralgia, unspecified joint        Relevant Orders    RHEUMATOID ARTHRITIS PANEL    Colon cancer screening        Relevant Orders    Ambulatory referral/consult " to Gastroenterology               Plan:   1. Benign prostatic hyperplasia, unspecified whether lower urinary tract symptoms present  -     tadalafiL (CIALIS) 5 MG tablet; Take 1 tablet (5 mg total) by mouth once daily.  Dispense: 90 tablet; Refill: 3  -     tamsulosin (FLOMAX) 0.4 mg Cap; tamsulosin 0.4 mg capsule  TAKE ONE CAPSULE BY MOUTH DAILY  Dispense: 90 capsule; Refill: 3  Controlled  Continue current Rx medication  Return to clinic with any concerns    2. Anxiety  -     buPROPion (WELLBUTRIN XL) 150 MG TB24 tablet; bupropion HCl  mg 24 hr tablet, extended release  TAKE ONE TABLET BY MOUTH DAILY  Dispense: 90 tablet; Refill: 3  Controlled  Continue current Rx medication  Return to clinic with any concerns    3. Arthralgia, unspecified joint  -     RHEUMATOID ARTHRITIS PANEL; Future; Expected date: 02/02/2023  Check RA panel    4. Colon cancer screening  -     Ambulatory referral/consult to Gastroenterology; Future; Expected date: 02/09/2023  Refer to Dr. Bob

## 2023-02-08 ENCOUNTER — CLINICAL SUPPORT (OUTPATIENT)
Dept: REHABILITATION | Facility: HOSPITAL | Age: 58
End: 2023-02-08
Payer: COMMERCIAL

## 2023-02-08 DIAGNOSIS — M76.62 TENDONITIS, ACHILLES, LEFT: Primary | ICD-10-CM

## 2023-02-08 DIAGNOSIS — M79.672 PAIN OF LEFT HEEL: ICD-10-CM

## 2023-02-08 PROCEDURE — 20560 NDL INSJ W/O NJX 1 OR 2 MUSC: CPT

## 2023-02-08 PROCEDURE — 97110 THERAPEUTIC EXERCISES: CPT

## 2023-02-08 PROCEDURE — 97140 MANUAL THERAPY 1/> REGIONS: CPT

## 2023-02-08 NOTE — PLAN OF CARE
"  Physical Therapy Treatment Note     Name: Rinku Herzog  Clinic Number: [id number]    Therapy Diagnosis:   No diagnosis found.    Physician: Virgil Lujan MD    Visit Date: 2/8/2023    Physician Orders: PT Eval and Treat  Medical Diagnosis from Referral: Achilles Tendonitis, Left leg/ankle  Evaluation Date: 8/23/2022  Authorization Period Expiration: 11/23/2022  Plan of Care Expiration: 11/23/2022  Visit # / Visits authorized: 19/88     Time In: 0928  Time Out: 1018  Total Billable Time:  50 minutes    Surgery: none  Orthopedic Precautions: none  Pertinent History: Back Surgery, HTN,     Subjective     Rinku comes back to therapy reporting that a couple of days ago pain in the left achilles probably a "5." Doing better today. Feels may be related to the way he was walking because of the R leg pain he is experiencing from his back. He continues with numbness and pain lower right leg calf area. States that the lateral 3 toes and foot is numb. Has back surgery micro discectomy 6 yrs ago. Had another steroid injection yesterday.  He agrees to PT session.    Pain: 2-3/10  Location: Left Achilles Tendon at lateral attachment    Outcome Measure:  Eval: LEFS 54/80 = 32.5% disability, 67.5% functional  9/19/2022: LEFS 66/80 = 17.5% disability, 82.5% functional  11/15/2022: LEFS 57/80=  28% disability, 71.2% functional  01/10/2023: LEFS 69/80= 13.7% disability, 86.3% functional    Objective     Rinku received the following treatment:     Time Activities   Manual 20 Mobilizations and distractions of the calcaneus and subtalar joint as well as forefoot; STM cross frictional and longitudinal lower leg and foot; PROM   TherAct     TherEx 15 Peroneal ankle eversions; static squat with heel raises, weighted seated heel raises, single leg heel raises, eccentric heel on stairs   Gait     Neuro Re-ed     Modalities     E-Stim     Dry Needling 15 Dry needling with e-stim to the left achilles   Canalith Repositioning           Home " Exercises Provided and Patient Education Provided     Education provided:   - Plan of care, HEP reviewed     Written Home Exercises Provided: yes.  Exercises were reviewed and Rinku was able to demonstrate them prior to the end of the session. Rinku demonstrated good  understanding of the education provided.     Assessment     Rinku is a 56 y.o. male referred to outpatient Physical Therapy with a medical diagnosis of Achilles Tendonitis, Left leg/ankle.     Rinku felt pretty good following today's session with only soreness in the achilles. He seemed to be walking better.Overall he continues to do well with dry needling with intramuscular electrical stimulation. ROM continues to be good and basically complete. With ankle eversion and he is demonstrating better peroneal strength @ 4+/5 to 5/5.  Will continue working towards return to PLOF of performing all ADL without pain.    Rinku received dry needling with intramuscular stimualtion performed by one of our trained therapist. Will continue with the plan of care.    Patient prognosis is good     Anticipated barriers to physical therapy: none    Goals: Rinku is progressing well towards his goals.    Short Term Goals: 6 weeks   Patient will report at least 20% disability reduction LEFS to indicate clinically significant functional improvement-GOAL MET  Patient will demonstrate complete ankle ROM to allow for improved left ankle biomechanics-GOAL MET  Patient will demonstrate 5/5 strength to allow return to function without imbalance-PARTIALLY MET  Patient will demonstrate independence with HEP-GOAL MET     Long Term Goals: 12 weeks   Patient will report at least 40% disability reduction LEFS to indicate clinically significant functional improvement-NEARLY MET  Patient will demonstrate return to PLOF prior to current diagnosis-NOT MET    Plan     Plan of care Certification: 8/23/2022 to 11/23/2022     Outpatient Physical Therapy 2-3 times weekly for 12 weeks to include the  following interventions: Gait Training, Manual Therapy, Moist Heat/ Ice, Neuromuscular Re-ed, Patient Education, Self Care, Therapeutic Activities and Therapeutic Exercise.     Chandler Castlelon, PT

## 2023-02-10 ENCOUNTER — CLINICAL SUPPORT (OUTPATIENT)
Dept: REHABILITATION | Facility: HOSPITAL | Age: 58
End: 2023-02-10
Payer: COMMERCIAL

## 2023-02-10 DIAGNOSIS — M79.672 PAIN OF LEFT HEEL: ICD-10-CM

## 2023-02-10 DIAGNOSIS — M76.62 TENDONITIS, ACHILLES, LEFT: Primary | ICD-10-CM

## 2023-02-10 PROCEDURE — 97110 THERAPEUTIC EXERCISES: CPT

## 2023-02-10 PROCEDURE — 97140 MANUAL THERAPY 1/> REGIONS: CPT

## 2023-02-10 PROCEDURE — 97014 ELECTRIC STIMULATION THERAPY: CPT

## 2023-02-10 NOTE — PLAN OF CARE
Physical Therapy Treatment Note     Name: Rinku Herzog  [facility] Number: [id number]    Therapy Diagnosis:   No diagnosis found.    Physician: Virgil Lujan MD    Visit Date: 2/10/2023    Physician Orders: PT Eval and Treat  Medical Diagnosis from Referral: Achilles Tendonitis, Left leg/ankle  Evaluation Date: 8/23/2022  Authorization Period Expiration: 11/23/2022  Plan of Care Expiration: 11/23/2022  Visit # / Visits authorized: 19/88     Time In: 0855  Time Out: 0945  Total Billable Time:  50 minutes    Surgery: none  Orthopedic Precautions: none  Pertinent History: Back Surgery, HTN,     Subjective     Rinku reports that the heel is better though had a incident where he stepped backwards in the shower and had a sharp pain. Doing better today. Remains with R leg pain and numbness. No real significant benefit from the steroid injection. Had back surgery micro discectomy 6 yrs ago. He agrees to PT session.    Pain: 2-3/10  Location: Left Achilles Tendon at lateral attachment    Outcome Measure:  Eval: LEFS 54/80 = 32.5% disability, 67.5% functional  9/19/2022: LEFS 66/80 = 17.5% disability, 82.5% functional  11/15/2022: LEFS 57/80=  28% disability, 71.2% functional  01/10/2023: LEFS 69/80= 13.7% disability, 86.3% functional    Objective     Rinku received the following treatment:     Time Activities   Manual 20 Mobilizations and distractions of the calcaneus and subtalar joint as well as forefoot; STM cross frictional and longitudinal lower leg and foot; PROM   TherAct     TherEx 15 Peroneal ankle eversions; static squat with heel raises, weighted seated heel raises, single leg heel raises, eccentric heel on stairs   Gait     Neuro Re-ed     Modalities     E-Stim     Dry Needling 15 Dry needling with e-stim to the left achilles   Canalith Repositioning           Home Exercises Provided and Patient Education Provided     Education provided:   - Plan of care, HEP reviewed     Written Home Exercises Provided:  yes.  Exercises were reviewed and Rinku was able to demonstrate them prior to the end of the session. Rinku demonstrated good  understanding of the education provided.     Assessment     Rinku is a 56 y.o. male referred to outpatient Physical Therapy with a medical diagnosis of Achilles Tendonitis, Left leg/ankle.     Rinku felt pretty good following today's session with only soreness in the achilles. He seemed to be walking better.Overall he continues to do well with dry needling with intramuscular electrical stimulation. ROM continues to be good and basically complete. With ankle eversion and he is demonstrating better peroneal strength @ 4+/5 to 5/5.  Will continue working towards return to PLOF of performing all ADL without pain.    Rinku received dry needling with intramuscular stimualtion performed by one of our trained therapist. Will continue with the plan of care.    Patient prognosis is good     Anticipated barriers to physical therapy: none    Goals: Rinku is progressing well towards his goals.    Short Term Goals: 6 weeks   Patient will report at least 20% disability reduction LEFS to indicate clinically significant functional improvement-GOAL MET  Patient will demonstrate complete ankle ROM to allow for improved left ankle biomechanics-GOAL MET  Patient will demonstrate 5/5 strength to allow return to function without imbalance-PARTIALLY MET  Patient will demonstrate independence with HEP-GOAL MET     Long Term Goals: 12 weeks   Patient will report at least 40% disability reduction LEFS to indicate clinically significant functional improvement-NEARLY MET  Patient will demonstrate return to PLOF prior to current diagnosis-NOT MET    Plan     Plan of care Certification: 8/23/2022 to 11/23/2022     Outpatient Physical Therapy 2-3 times weekly for 12 weeks to include the following interventions: Gait Training, Manual Therapy, Moist Heat/ Ice, Neuromuscular Re-ed, Patient Education, Self Care, Therapeutic  Activities and Therapeutic Exercise.     Chandler Castellon, PT

## 2023-03-03 ENCOUNTER — CLINICAL SUPPORT (OUTPATIENT)
Dept: REHABILITATION | Facility: HOSPITAL | Age: 58
End: 2023-03-03
Payer: COMMERCIAL

## 2023-03-03 DIAGNOSIS — M79.672 PAIN OF LEFT HEEL: ICD-10-CM

## 2023-03-03 DIAGNOSIS — M76.62 TENDONITIS, ACHILLES, LEFT: Primary | ICD-10-CM

## 2023-03-03 PROCEDURE — 97110 THERAPEUTIC EXERCISES: CPT

## 2023-03-03 PROCEDURE — 97140 MANUAL THERAPY 1/> REGIONS: CPT

## 2023-03-03 NOTE — PLAN OF CARE
Physical Therapy Treatment Note     Name: Rinku Herzog  [facility] Number: [id number]    Therapy Diagnosis:   No diagnosis found.    Physician: Virgil Lujan MD    Visit Date: 3/3/2023    Physician Orders: PT Eval and Treat  Medical Diagnosis from Referral: Achilles Tendonitis, Left leg/ankle  Evaluation Date: 8/23/2022  Authorization Period Expiration: 11/23/2022  Plan of Care Expiration: 11/23/2022  Visit # / Visits authorized: 22/88     Time In: 0712  Time Out: 0802  Total Billable Time:  50 minutes    Surgery: none  Orthopedic Precautions: none  Pertinent History: Back Surgery, HTN,     Subjective     Rinku reports that the heel has been doing pretty good reporting 1-2/10 pain but about a week ago started having numbness on the bottom of his heel. Has been managing his back and R leg symptoms with stretching. Has a nerve conduction but was unable to tolerate it so results were inconclusive. He agrees to PT session.    Pain: 2-3/10  Location: Left Achilles Tendon at lateral attachment    Outcome Measure:  Eval: LEFS 54/80 = 32.5% disability, 67.5% functional  9/19/2022: LEFS 66/80 = 17.5% disability, 82.5% functional  11/15/2022: LEFS 57/80=  28% disability, 71.2% functional  01/10/2023: LEFS 69/80= 13.7% disability, 86.3% functional    Objective     Rinku received the following treatment:     Time Activities   Manual 20 Mobilizations and distractions of the calcaneus and subtalar joint as well as forefoot; STM cross frictional and longitudinal lower leg and foot; PROM   TherAct     TherEx 15 Peroneal ankle eversions; single leg heel raises, eccentric heel on stairs   Gait     Neuro Re-ed     Modalities     E-Stim     Dry Needling 15 Dry needling with e-stim to the left achilles   Canalith Repositioning           Home Exercises Provided and Patient Education Provided     Education provided:   - Plan of care, HEP reviewed     Written Home Exercises Provided: yes.  Exercises were reviewed and Rinku was able to  demonstrate them prior to the end of the session. Rinku demonstrated good  understanding of the education provided.     Assessment     Rinku is a 56 y.o. male referred to outpatient Physical Therapy with a medical diagnosis of Achilles Tendonitis, Left leg/ankle.     Overall he continues to do well with dry needling with intramuscular electrical stimulation. ROM continues to be good and basically complete. With ankle eversion and he is demonstrating better peroneal strength @ 4+/5 to 5/5. Will continue working towards return to PLOF of performing all ADL without pain.    Rinku received dry needling with intramuscular stimualtion performed by one of our trained therapist. Will continue with the plan of care.    Patient prognosis is good     Anticipated barriers to physical therapy: none    Goals: Rinku is progressing well towards his goals.    Short Term Goals: 6 weeks   Patient will report at least 20% disability reduction LEFS to indicate clinically significant functional improvement-GOAL MET  Patient will demonstrate complete ankle ROM to allow for improved left ankle biomechanics-GOAL MET  Patient will demonstrate 5/5 strength to allow return to function without imbalance-PARTIALLY MET  Patient will demonstrate independence with HEP-GOAL MET     Long Term Goals: 12 weeks   Patient will report at least 40% disability reduction LEFS to indicate clinically significant functional improvement-NEARLY MET  Patient will demonstrate return to PLOF prior to current diagnosis-NOT MET    Plan     Plan of care Certification: 8/23/2022 to 11/23/2022     Outpatient Physical Therapy 2-3 times weekly for 12 weeks to include the following interventions: Gait Training, Manual Therapy, Moist Heat/ Ice, Neuromuscular Re-ed, Patient Education, Self Care, Therapeutic Activities and Therapeutic Exercise.     Chandler Castellon, PT

## 2023-03-08 ENCOUNTER — CLINICAL SUPPORT (OUTPATIENT)
Dept: REHABILITATION | Facility: HOSPITAL | Age: 58
End: 2023-03-08
Payer: COMMERCIAL

## 2023-03-08 DIAGNOSIS — M79.672 PAIN OF LEFT HEEL: ICD-10-CM

## 2023-03-08 DIAGNOSIS — M76.62 TENDONITIS, ACHILLES, LEFT: Primary | ICD-10-CM

## 2023-03-08 PROCEDURE — 97140 MANUAL THERAPY 1/> REGIONS: CPT

## 2023-03-08 PROCEDURE — 20560 NDL INSJ W/O NJX 1 OR 2 MUSC: CPT

## 2023-03-08 PROCEDURE — 97110 THERAPEUTIC EXERCISES: CPT

## 2023-03-08 NOTE — PLAN OF CARE
Physical Therapy Treatment Note     Name: Rinku Herzog  Clinic Number: [id number]    Therapy Diagnosis:   No diagnosis found.    Physician: Virgil Lujan MD    Visit Date: 3/8/2023    Physician Orders: PT Eval and Treat  Medical Diagnosis from Referral: Achilles Tendonitis, Left leg/ankle  Evaluation Date: 8/23/2022  Authorization Period Expiration: 11/23/2022  Plan of Care Expiration: 11/23/2022  Visit # / Visits authorized: 23/88     Time In: 0730  Time Out: 0820  Total Billable Time:  50 minutes    Surgery: none  Orthopedic Precautions: none  Pertinent History: Back Surgery, HTN,     Subjective     Rinku continues with 1-2/10 level pain. Still with numbness bottom of the heel. Has been managing his back and R leg symptoms with stretching. He agrees to PT session.    Pain: 1-2/10  Location: Left Achilles Tendon at lateral attachment    Outcome Measure:  Eval: LEFS 54/80 = 32.5% disability, 67.5% functional  9/19/2022: LEFS 66/80 = 17.5% disability, 82.5% functional  11/15/2022: LEFS 57/80=  28% disability, 71.2% functional  01/10/2023: LEFS 69/80= 13.7% disability, 86.3% functional    Objective     Rinku received the following treatment:     Time Activities   Manual 20 Mobilizations and distractions of the calcaneus and subtalar joint as well as forefoot; STM cross frictional and longitudinal lower leg and foot; PROM   TherAct     TherEx 15 Peroneal ankle eversions; single leg heel raises, eccentric heel on stairs   Gait     Neuro Re-ed     Modalities     E-Stim     Dry Needling 15 Dry needling with e-stim to the left achilles   Canalith Repositioning           Home Exercises Provided and Patient Education Provided     Education provided:   - Plan of care, HEP reviewed     Written Home Exercises Provided: yes.  Exercises were reviewed and Rinku was able to demonstrate them prior to the end of the session. Rinku demonstrated good  understanding of the education provided.     Assessment     Rinku is a 56 y.o.  male referred to outpatient Physical Therapy with a medical diagnosis of Achilles Tendonitis, Left leg/ankle.     Overall he continues to do well with dry needling with intramuscular electrical stimulation. Pain level is decreased overall and holding at low level. ROM continues to be good and basically complete. With ankle eversion and he is demonstrating better peroneal strength which is 4+/5 to 5/5. Will continue working towards return to PLOF of performing all ADL without pain.    Rinku received dry needling with intramuscular stimualtion performed by one of our trained therapist. Will continue with the plan of care.    Patient prognosis is good     Anticipated barriers to physical therapy: none    Goals: Rinku is progressing well towards his goals.    Short Term Goals: 6 weeks   Patient will report at least 20% disability reduction LEFS to indicate clinically significant functional improvement-GOAL MET  Patient will demonstrate complete ankle ROM to allow for improved left ankle biomechanics-GOAL MET  Patient will demonstrate 5/5 strength to allow return to function without imbalance-PARTIALLY MET  Patient will demonstrate independence with HEP-GOAL MET     Long Term Goals: 12 weeks   Patient will report at least 40% disability reduction LEFS to indicate clinically significant functional improvement-NEARLY MET  Patient will demonstrate return to PLOF prior to current diagnosis-NOT MET    Plan     Plan of care Certification: 8/23/2022 to 11/23/2022     Outpatient Physical Therapy 2-3 times weekly for 12 weeks to include the following interventions: Gait Training, Manual Therapy, Moist Heat/ Ice, Neuromuscular Re-ed, Patient Education, Self Care, Therapeutic Activities and Therapeutic Exercise.     Chandler Castellon, PT

## 2023-03-10 ENCOUNTER — CLINICAL SUPPORT (OUTPATIENT)
Dept: REHABILITATION | Facility: HOSPITAL | Age: 58
End: 2023-03-10
Payer: COMMERCIAL

## 2023-03-10 DIAGNOSIS — M79.672 PAIN OF LEFT HEEL: ICD-10-CM

## 2023-03-10 DIAGNOSIS — M76.62 TENDONITIS, ACHILLES, LEFT: Primary | ICD-10-CM

## 2023-03-10 PROCEDURE — 20560 NDL INSJ W/O NJX 1 OR 2 MUSC: CPT

## 2023-03-10 PROCEDURE — 97140 MANUAL THERAPY 1/> REGIONS: CPT

## 2023-03-10 NOTE — PLAN OF CARE
Physical Therapy Treatment Note     Name: Rinku Herzog  Clinic Number: [id number]    Therapy Diagnosis:   No diagnosis found.    Physician: Virgil Lujan MD    Visit Date: 3/10/2023    Physician Orders: PT Eval and Treat  Medical Diagnosis from Referral: Achilles Tendonitis, Left leg/ankle  Evaluation Date: 8/23/2022  Authorization Period Expiration: 11/23/2022  Plan of Care Expiration: 11/23/2022  Visit # / Visits authorized: 24/88     Time In: 0926  Time Out: 1006  Total Billable Time:  40 minutes    Surgery: none  Orthopedic Precautions: none  Pertinent History: Back Surgery, HTN,     Subjective     Rinku continues with 1-2/10 level pain. Still with numbness bottom of the heel. Has been managing his back and R leg symptoms with stretching. He agrees to PT session.    Pain: 1-2/10  Location: Left Achilles Tendon at lateral attachment    Outcome Measure:  Eval: LEFS 54/80 = 32.5% disability, 67.5% functional  9/19/2022: LEFS 66/80 = 17.5% disability, 82.5% functional  11/15/2022: LEFS 57/80=  28% disability, 71.2% functional  01/10/2023: LEFS 69/80= 13.7% disability, 86.3% functional    Objective     Rinku received the following treatment:     Time Activities   Manual 20 Mobilizations and distractions of the calcaneus and subtalar joint as well as forefoot; STM cross frictional and longitudinal lower leg and foot; PROM   TherAct     TherEx     Gait     Neuro Re-ed     Modalities     E-Stim     Dry Needling 15 Dry needling with e-stim to the left achilles   Canalith Repositioning           Home Exercises Provided and Patient Education Provided     Education provided:   - Plan of care, HEP reviewed     Written Home Exercises Provided: yes.  Exercises were reviewed and Rinku was able to demonstrate them prior to the end of the session. Rinku demonstrated good  understanding of the education provided.     Assessment     Rinku is a 56 y.o. male referred to outpatient Physical Therapy with a medical diagnosis of  Achilles Tendonitis, Left leg/ankle.     Overall he continues to do well with dry needling with intramuscular electrical stimulation. Pain level is decreased overall and holding at low level. ROM continues to be good and basically complete. With ankle eversion and he is demonstrating better peroneal strength which is 4+/5 to 5/5. Will continue working towards return to PLOF of performing all ADL without pain.    Rinku received dry needling with intramuscular stimualtion performed by one of our trained therapist. Will continue with the plan of care.    Patient prognosis is good     Anticipated barriers to physical therapy: none    Goals: Rinku is progressing well towards his goals.    Short Term Goals: 6 weeks   Patient will report at least 20% disability reduction LEFS to indicate clinically significant functional improvement-GOAL MET  Patient will demonstrate complete ankle ROM to allow for improved left ankle biomechanics-GOAL MET  Patient will demonstrate 5/5 strength to allow return to function without imbalance-PARTIALLY MET  Patient will demonstrate independence with HEP-GOAL MET     Long Term Goals: 12 weeks   Patient will report at least 40% disability reduction LEFS to indicate clinically significant functional improvement-NEARLY MET  Patient will demonstrate return to PLOF prior to current diagnosis-NOT MET    Plan     Plan of care Certification: 8/23/2022 to 11/23/2022     Outpatient Physical Therapy 2-3 times weekly for 12 weeks to include the following interventions: Gait Training, Manual Therapy, Moist Heat/ Ice, Neuromuscular Re-ed, Patient Education, Self Care, Therapeutic Activities and Therapeutic Exercise.     Chandler Castellon, PT

## 2023-04-12 ENCOUNTER — CLINICAL SUPPORT (OUTPATIENT)
Dept: REHABILITATION | Facility: HOSPITAL | Age: 58
End: 2023-04-12
Payer: COMMERCIAL

## 2023-04-12 DIAGNOSIS — M76.62 TENDONITIS, ACHILLES, LEFT: Primary | ICD-10-CM

## 2023-04-12 DIAGNOSIS — M79.672 PAIN OF LEFT HEEL: ICD-10-CM

## 2023-04-12 PROCEDURE — 20560 NDL INSJ W/O NJX 1 OR 2 MUSC: CPT

## 2023-04-12 PROCEDURE — 97140 MANUAL THERAPY 1/> REGIONS: CPT

## 2023-04-12 NOTE — PLAN OF CARE
Physical Therapy Treatment Note     Name: Rinku Herzog  Clinic Number: [id number]    Therapy Diagnosis:   No diagnosis found.    Physician: Virgil Lujan MD    Visit Date: 4/12/2023    Physician Orders: PT Eval and Treat  Medical Diagnosis from Referral: Achilles Tendonitis, Left leg/ankle  Evaluation Date: 8/23/2022  Authorization Period Expiration: 11/23/2022  Plan of Care Expiration: 11/23/2022  Visit # / Visits authorized: 24/88     Time In: 0718  Time Out: 0758  Total Billable Time:  40 minutes    Surgery: none  Orthopedic Precautions: none  Pertinent History: Back Surgery, HTN,     Subjective     Rinku returns to physical therapy after being off shore. Reports 1-2/10 level Achilles pain. Still with numbness bottom of the heel. Has been managing his back and R leg symptoms with stretching but had another injection yesterday. R foot is staying numb. He agrees to PT session.    Pain: 1-2/10  Location: Left Achilles Tendon at lateral attachment    Outcome Measure:  Eval: LEFS 54/80 = 32.5% disability, 67.5% functional  9/19/2022: LEFS 66/80 = 17.5% disability, 82.5% functional  11/15/2022: LEFS 57/80=  28% disability, 71.2% functional  01/10/2023: LEFS 69/80= 13.7% disability, 86.3% functional    Objective     Rinku received the following treatment:     Time Activities   Manual 25 Mobilizations and distractions of the calcaneus and subtalar joint as well as forefoot; STM cross frictional and longitudinal lower leg and foot; PROM   TherAct     TherEx     Gait     Neuro Re-ed     Modalities     E-Stim     Dry Needling 15 Dry needling with e-stim to the left achilles   Canalith Repositioning           Home Exercises Provided and Patient Education Provided     Education provided:   - Plan of care, HEP reviewed     Written Home Exercises Provided: yes.  Exercises were reviewed and Rinku was able to demonstrate them prior to the end of the session. Rinku demonstrated good  understanding of the education provided.      Assessment     Rinku is a 56 y.o. male referred to outpatient Physical Therapy with a medical diagnosis of Achilles Tendonitis, Left leg/ankle.     Continues to do well with dry needling with intramuscular electrical stimulation. Has no pain when he leaves.  ROM continues to be good and basically complete. With ankle eversion and he is demonstrating better peroneal strength which is 4+/5 to 5/5. Will continue working towards return to PLOF of performing all ADL without pain.    Rinku received dry needling with intramuscular stimualtion performed by one of our trained therapist. Will continue with the plan of care.    Patient prognosis is good     Anticipated barriers to physical therapy: none    Goals: Rinku is progressing well towards his goals.    Short Term Goals: 6 weeks   Patient will report at least 20% disability reduction LEFS to indicate clinically significant functional improvement-GOAL MET  Patient will demonstrate complete ankle ROM to allow for improved left ankle biomechanics-GOAL MET  Patient will demonstrate 5/5 strength to allow return to function without imbalance-PARTIALLY MET  Patient will demonstrate independence with HEP-GOAL MET     Long Term Goals: 12 weeks   Patient will report at least 40% disability reduction LEFS to indicate clinically significant functional improvement-NEARLY MET  Patient will demonstrate return to PLOF prior to current diagnosis-NOT MET    Plan     Plan of care Certification: 8/23/2022 to 11/23/2022     Outpatient Physical Therapy 2-3 times weekly for 12 weeks to include the following interventions: Gait Training, Manual Therapy, Moist Heat/ Ice, Neuromuscular Re-ed, Patient Education, Self Care, Therapeutic Activities and Therapeutic Exercise.     Chandler Castellon, PT

## 2023-04-17 ENCOUNTER — CLINICAL SUPPORT (OUTPATIENT)
Dept: REHABILITATION | Facility: HOSPITAL | Age: 58
End: 2023-04-17
Payer: COMMERCIAL

## 2023-04-17 DIAGNOSIS — M79.672 PAIN OF LEFT HEEL: ICD-10-CM

## 2023-04-17 DIAGNOSIS — M76.62 TENDONITIS, ACHILLES, LEFT: Primary | ICD-10-CM

## 2023-04-17 PROCEDURE — 97140 MANUAL THERAPY 1/> REGIONS: CPT

## 2023-04-17 NOTE — PLAN OF CARE
Physical Therapy Treatment Note     Name: Rinku Herzog  [facility] Number: [id number]    Therapy Diagnosis:   No diagnosis found.    Physician: Virgil Lujan MD    Visit Date: 4/17/2023    Physician Orders: PT Eval and Treat  Medical Diagnosis from Referral: Achilles Tendonitis, Left leg/ankle  Evaluation Date: 8/23/2022  Authorization Period Expiration: 11/23/2022  Plan of Care Expiration: 11/23/2022  Visit # / Visits authorized: 26/88     Time In: 0706  Time Out: 0746  Total Billable Time:  40 minutes    Surgery: none  Orthopedic Precautions: none  Pertinent History: Back Surgery, HTN,     Subjective     Rinku reports that the heel is not too bad. Reports 1-2/10 level Achilles pain. Still with numbness bottom of the R heel. Has been managing his back and R leg symptoms with stretching. Reports that the injection is starting to ween. R foot is staying numb with occasional leg pain. He agrees to PT session.    Pain: 1-2/10  Location: Left Achilles Tendon at lateral attachment    Outcome Measure:  Eval: LEFS 54/80 = 32.5% disability, 67.5% functional  9/19/2022: LEFS 66/80 = 17.5% disability, 82.5% functional  11/15/2022: LEFS 57/80=  28% disability, 71.2% functional  01/10/2023: LEFS 69/80= 13.7% disability, 86.3% functional    Objective     Rinku received the following treatment:     Time Activities   Manual 25 Mobilizations and distractions of the calcaneus and subtalar joint as well as forefoot; STM cross frictional and longitudinal lower leg and foot; PROM   TherAct     TherEx     Gait     Neuro Re-ed     Modalities     E-Stim     Dry Needling 15 Dry needling with e-stim to the left achilles   Canalith Repositioning           Home Exercises Provided and Patient Education Provided     Education provided:   - Plan of care, HEP reviewed     Written Home Exercises Provided: yes.  Exercises were reviewed and Rinku was able to demonstrate them prior to the end of the session. Rinku demonstrated good  understanding of  the education provided.     Assessment     Rinku is a 56 y.o. male referred to outpatient Physical Therapy with a medical diagnosis of Achilles Tendonitis, Left leg/ankle.     Continues to do well with dry needling with intramuscular electrical stimulation. Has no pain when he leaves.  ROM continues to be good and basically complete. With ankle eversion and he is demonstrating better peroneal strength which is 4+/5 to 5/5. Will continue working towards return to PLOF of performing all ADL without pain.    Rinku received dry needling with intramuscular stimualtion performed by one of our trained therapist. Will continue with the plan of care.    Patient prognosis is good     Anticipated barriers to physical therapy: none    Goals: Rinku is progressing well towards his goals.    Short Term Goals: 6 weeks   Patient will report at least 20% disability reduction LEFS to indicate clinically significant functional improvement-GOAL MET  Patient will demonstrate complete ankle ROM to allow for improved left ankle biomechanics-GOAL MET  Patient will demonstrate 5/5 strength to allow return to function without imbalance-PARTIALLY MET  Patient will demonstrate independence with HEP-GOAL MET     Long Term Goals: 12 weeks   Patient will report at least 40% disability reduction LEFS to indicate clinically significant functional improvement-NEARLY MET  Patient will demonstrate return to PLOF prior to current diagnosis-NOT MET    Plan     Plan of care Certification: 8/23/2022 to 11/23/2022     Outpatient Physical Therapy 2-3 times weekly for 12 weeks to include the following interventions: Gait Training, Manual Therapy, Moist Heat/ Ice, Neuromuscular Re-ed, Patient Education, Self Care, Therapeutic Activities and Therapeutic Exercise.     Chandler Castellon, PT

## 2023-04-21 ENCOUNTER — CLINICAL SUPPORT (OUTPATIENT)
Dept: REHABILITATION | Facility: HOSPITAL | Age: 58
End: 2023-04-21
Payer: COMMERCIAL

## 2023-04-21 DIAGNOSIS — M76.62 TENDONITIS, ACHILLES, LEFT: Primary | ICD-10-CM

## 2023-04-21 DIAGNOSIS — M79.672 PAIN OF LEFT HEEL: ICD-10-CM

## 2023-04-21 PROCEDURE — 97140 MANUAL THERAPY 1/> REGIONS: CPT

## 2023-04-21 PROCEDURE — 97110 THERAPEUTIC EXERCISES: CPT

## 2023-04-21 PROCEDURE — 20560 NDL INSJ W/O NJX 1 OR 2 MUSC: CPT

## 2023-04-21 NOTE — PLAN OF CARE
Physical Therapy Treatment Note     Name: Rinku Herzog  [facility] Number: [id number]    Therapy Diagnosis:   No diagnosis found.    Physician: Virgil Lujan MD    Visit Date: 4/21/2023    Physician Orders: PT Eval and Treat  Medical Diagnosis from Referral: Achilles Tendonitis, Left leg/ankle  Evaluation Date: 8/23/2022  Authorization Period Expiration: 11/23/2022  Plan of Care Expiration: 11/23/2022  Visit # / Visits authorized: 26/88     Time In: 0714  Time Out: 0759  Total Billable Time:  45 minutes    Surgery: none  Orthopedic Precautions: none  Pertinent History: Back Surgery, HTN,     Subjective     Rinku reports that the heel continues to be not too bad. Reports 1-2/10 level Achilles pain. Still with numbness bottom of the R heel. Saw his doctor and stated that he may need a back fusion.. R foot is staying numb and now having more pain into the calf. He agrees to PT session.    Pain: 1-2/10  Location: Left Achilles Tendon at lateral attachment    Outcome Measure:  Eval: LEFS 54/80 = 32.5% disability, 67.5% functional  9/19/2022: LEFS 66/80 = 17.5% disability, 82.5% functional  11/15/2022: LEFS 57/80=  28% disability, 71.2% functional  01/10/2023: LEFS 69/80= 13.7% disability, 86.3% functional    Objective     Rinku received the following treatment:     Time Activities   Manual 20 Mobilizations and distractions of the calcaneus and subtalar joint as well as forefoot; STM cross frictional and longitudinal lower leg and foot; PROM   TherAct     TherEx 10 Weighted peroneal ankle eversions   Gait     Neuro Re-ed     Modalities     E-Stim     Dry Needling 15 Dry needling with e-stim to the left achilles   Canalith Repositioning           Home Exercises Provided and Patient Education Provided     Education provided:   - Plan of care, HEP reviewed     Written Home Exercises Provided: yes.  Exercises were reviewed and Rinku was able to demonstrate them prior to the end of the session. Rinku demonstrated good   understanding of the education provided.     Assessment     Rinku is a 56 y.o. male referred to outpatient Physical Therapy with a medical diagnosis of Achilles Tendonitis, Left leg/ankle.     Continues to do well with dry needling with intramuscular electrical stimulation. Has no pain when he leaves.  ROM continues to be good and basically complete. With ankle eversion and he is demonstrating better peroneal strength which is 4+/5 to 5/5. Will continue working towards return to PLOF of performing all ADL without pain.    Rinku received dry needling with intramuscular stimualtion performed by one of our trained therapist.  Will continue with the plan of care.    Patient prognosis is good     Anticipated barriers to physical therapy: none    Goals: Rinku is progressing well towards his goals.    Short Term Goals: 6 weeks   Patient will report at least 20% disability reduction LEFS to indicate clinically significant functional improvement-GOAL MET  Patient will demonstrate complete ankle ROM to allow for improved left ankle biomechanics-GOAL MET  Patient will demonstrate 5/5 strength to allow return to function without imbalance-PARTIALLY MET  Patient will demonstrate independence with HEP-GOAL MET     Long Term Goals: 12 weeks   Patient will report at least 40% disability reduction LEFS to indicate clinically significant functional improvement-NEARLY MET  Patient will demonstrate return to PLOF prior to current diagnosis-NOT MET    Plan     Plan of care Certification: 8/23/2022 to 11/23/2022     Outpatient Physical Therapy 2-3 times weekly for 12 weeks to include the following interventions: Gait Training, Manual Therapy, Moist Heat/ Ice, Neuromuscular Re-ed, Patient Education, Self Care, Therapeutic Activities and Therapeutic Exercise.     Chandler Castellon, PT

## 2023-04-24 ENCOUNTER — CLINICAL SUPPORT (OUTPATIENT)
Dept: REHABILITATION | Facility: HOSPITAL | Age: 58
End: 2023-04-24
Payer: COMMERCIAL

## 2023-04-24 DIAGNOSIS — M76.62 TENDONITIS, ACHILLES, LEFT: Primary | ICD-10-CM

## 2023-04-24 DIAGNOSIS — M79.672 PAIN OF LEFT HEEL: ICD-10-CM

## 2023-04-24 PROCEDURE — 20560 NDL INSJ W/O NJX 1 OR 2 MUSC: CPT

## 2023-04-24 PROCEDURE — 97110 THERAPEUTIC EXERCISES: CPT

## 2023-04-24 PROCEDURE — 97140 MANUAL THERAPY 1/> REGIONS: CPT

## 2023-04-24 NOTE — PLAN OF CARE
Physical Therapy Treatment Note     Name: Rinku Herzog  Clinic Number: [id number]    Therapy Diagnosis:   No diagnosis found.    Physician: Virgil Lujan MD    Visit Date: 4/24/2023    Physician Orders: PT Eval and Treat  Medical Diagnosis from Referral: Achilles Tendonitis, Left leg/ankle  Evaluation Date: 8/23/2022  Authorization Period Expiration: 11/23/2022  Plan of Care Expiration: 11/23/2022  Visit # / Visits authorized: 26/88     Time In: 0714  Time Out: 0759  Total Billable Time:  45 minutes    Surgery: none  Orthopedic Precautions: none  Pertinent History: Back Surgery, HTN,     Subjective     Rinku went to a concert over the weekend and did a lot of walking and pain in the heel went up to 3-4 level as opposed to 1-2 level that it was.. The prolonged sitting made his R leg numbness got worse.. R foot is staying numb and now having more pain into the calf than before. He agrees to PT session.    Pain: 1-2/10  Location: Left Achilles Tendon at lateral attachment    Outcome Measure:  Eval: LEFS 54/80 = 32.5% disability, 67.5% functional  9/19/2022: LEFS 66/80 = 17.5% disability, 82.5% functional  11/15/2022: LEFS 57/80=  28% disability, 71.2% functional  01/10/2023: LEFS 69/80= 13.7% disability, 86.3% functional    Objective     Rinku received the following treatment:     Time Activities   Manual 20 Mobilizations and distractions of the calcaneus and subtalar joint as well as forefoot; STM cross frictional and longitudinal lower leg and foot; PROM   TherAct     TherEx 10 Weighted peroneal ankle eversions   Gait     Neuro Re-ed     Modalities     E-Stim     Dry Needling 15 Dry needling with e-stim to the left achilles and low back area.   Canalith Repositioning           Home Exercises Provided and Patient Education Provided     Education provided:   - Plan of care, HEP reviewed     Written Home Exercises Provided: yes.  Exercises were reviewed and Rinku was able to demonstrate them prior to the end of the  "session. Rinku demonstrated good  understanding of the education provided.     Assessment     Rinku is a 56 y.o. male referred to outpatient Physical Therapy with a medical diagnosis of Achilles Tendonitis, Left leg/ankle.     Continues to do well with dry needling with intramuscular electrical stimulation. Has no pain when he leaves.  ROM continues to be good and basically complete. With ankle eversion and he is demonstrating better peroneal strength which is 4+/5 to 5/5. No longer with girth difference left calf compared to the right. Right measured 17 1/4" and the left 17 1/8". Will continue working towards return to PLOF of performing all ADL without pain.    Rinku received dry needling with intramuscular stimualtion performed by one of our trained therapist.  Will continue with the plan of care.    Patient prognosis is good     Anticipated barriers to physical therapy: none    Goals: Rinku is progressing well towards his goals.    Short Term Goals: 6 weeks   Patient will report at least 20% disability reduction LEFS to indicate clinically significant functional improvement-GOAL MET  Patient will demonstrate complete ankle ROM to allow for improved left ankle biomechanics-GOAL MET  Patient will demonstrate 5/5 strength to allow return to function without imbalance-PARTIALLY MET  Patient will demonstrate independence with HEP-GOAL MET     Long Term Goals: 12 weeks   Patient will report at least 40% disability reduction LEFS to indicate clinically significant functional improvement-NEARLY MET  Patient will demonstrate return to PLOF prior to current diagnosis-NOT MET    Plan     Plan of care Certification: 8/23/2022 to 11/23/2022     Outpatient Physical Therapy 2-3 times weekly for 12 weeks to include the following interventions: Gait Training, Manual Therapy, Moist Heat/ Ice, Neuromuscular Re-ed, Patient Education, Self Care, Therapeutic Activities and Therapeutic Exercise.     Chandler Castellon, PT   "

## 2023-05-10 ENCOUNTER — LAB VISIT (OUTPATIENT)
Dept: LAB | Facility: HOSPITAL | Age: 58
End: 2023-05-10
Attending: FAMILY MEDICINE
Payer: COMMERCIAL

## 2023-05-10 DIAGNOSIS — M10.9 GOUT, UNSPECIFIED CAUSE, UNSPECIFIED CHRONICITY, UNSPECIFIED SITE: Primary | ICD-10-CM

## 2023-05-10 DIAGNOSIS — E29.1 TESTICULAR HYPOFUNCTION: Primary | ICD-10-CM

## 2023-05-10 DIAGNOSIS — M25.50 ARTHRALGIA, UNSPECIFIED JOINT: ICD-10-CM

## 2023-05-10 DIAGNOSIS — M10.9 GOUT, UNSPECIFIED CAUSE, UNSPECIFIED CHRONICITY, UNSPECIFIED SITE: ICD-10-CM

## 2023-05-10 LAB
BASOPHILS # BLD AUTO: 0.07 X10(3)/MCL
BASOPHILS NFR BLD AUTO: 0.6 %
EOSINOPHIL # BLD AUTO: 0.09 X10(3)/MCL (ref 0–0.9)
EOSINOPHIL NFR BLD AUTO: 0.8 %
ERYTHROCYTE [DISTWIDTH] IN BLOOD BY AUTOMATED COUNT: 14.2 % (ref 11.5–17)
ESTRADIOL SERPL HS-MCNC: 30 PG/ML
HCT VFR BLD AUTO: 51.6 % (ref 42–52)
HGB BLD-MCNC: 17.2 G/DL (ref 14–18)
IMM GRANULOCYTES # BLD AUTO: 0.4 X10(3)/MCL (ref 0–0.04)
IMM GRANULOCYTES NFR BLD AUTO: 3.7 %
LYMPHOCYTES # BLD AUTO: 1.86 X10(3)/MCL (ref 0.6–4.6)
LYMPHOCYTES NFR BLD AUTO: 17.2 %
MCH RBC QN AUTO: 31.3 PG (ref 27–31)
MCHC RBC AUTO-ENTMCNC: 33.3 G/DL (ref 33–36)
MCV RBC AUTO: 94 FL (ref 80–94)
MONOCYTES # BLD AUTO: 0.9 X10(3)/MCL (ref 0.1–1.3)
MONOCYTES NFR BLD AUTO: 8.3 %
NEUTROPHILS # BLD AUTO: 7.51 X10(3)/MCL (ref 2.1–9.2)
NEUTROPHILS NFR BLD AUTO: 69.4 %
NRBC BLD AUTO-RTO: 0 %
PLATELET # BLD AUTO: 174 X10(3)/MCL (ref 130–400)
PMV BLD AUTO: 9.3 FL (ref 7.4–10.4)
RBC # BLD AUTO: 5.49 X10(6)/MCL (ref 4.7–6.1)
URATE SERPL-MCNC: 5.9 MG/DL (ref 3.5–7.2)
WBC # SPEC AUTO: 10.83 X10(3)/MCL (ref 4.5–11.5)

## 2023-05-10 PROCEDURE — 82670 ASSAY OF TOTAL ESTRADIOL: CPT

## 2023-05-10 PROCEDURE — 86200 CCP ANTIBODY: CPT

## 2023-05-10 PROCEDURE — 84402 ASSAY OF FREE TESTOSTERONE: CPT | Mod: 90

## 2023-05-10 PROCEDURE — 85025 COMPLETE CBC W/AUTO DIFF WBC: CPT

## 2023-05-10 PROCEDURE — 84550 ASSAY OF BLOOD/URIC ACID: CPT

## 2023-05-10 PROCEDURE — 36415 COLL VENOUS BLD VENIPUNCTURE: CPT

## 2023-05-10 PROCEDURE — 86431 RHEUMATOID FACTOR QUANT: CPT

## 2023-05-10 RX ORDER — INDOMETHACIN 50 MG/1
50 CAPSULE ORAL 3 TIMES DAILY
Qty: 30 CAPSULE | Refills: 0 | Status: SHIPPED | OUTPATIENT
Start: 2023-05-10 | End: 2023-05-20

## 2023-05-11 ENCOUNTER — CLINICAL SUPPORT (OUTPATIENT)
Dept: REHABILITATION | Facility: HOSPITAL | Age: 58
End: 2023-05-11
Payer: COMMERCIAL

## 2023-05-11 DIAGNOSIS — M76.62 TENDONITIS, ACHILLES, LEFT: Primary | ICD-10-CM

## 2023-05-11 DIAGNOSIS — M79.672 PAIN OF LEFT HEEL: ICD-10-CM

## 2023-05-11 LAB
CYCLIC CITRULLINATED PEPTIDE (CCP) (OHS): 0.9 U/ML
RHEUMATOID FACTOR IGA QUANTITATIVE (OLG): 3.3 IU/ML
RHEUMATOID FACTOR IGM QUANTITATIVE (OLG): <0.6 IU/ML

## 2023-05-11 PROCEDURE — 97110 THERAPEUTIC EXERCISES: CPT

## 2023-05-11 PROCEDURE — 97140 MANUAL THERAPY 1/> REGIONS: CPT

## 2023-05-11 PROCEDURE — 20561 NDL INSJ W/O NJX 3+ MUSC: CPT

## 2023-05-11 NOTE — PLAN OF CARE
Physical Therapy Treatment Note     Name: Rinku Herzog  Clinic Number: [id number]    Therapy Diagnosis:   No diagnosis found.    Physician: Jaylon Mishra MD    Visit Date: 5/11/2023    Physician Orders: PT Eval and Treat  Medical Diagnosis from Referral: Achilles Tendonitis, Left leg/ankle  Evaluation Date: 8/23/2022  Authorization Period Expiration: 11/23/2022  Plan of Care Expiration: 11/23/2022  Visit # / Visits authorized: 29/88     Time In: 0714  Time Out: 0759  Total Billable Time:  45 minutes    Surgery: none  Orthopedic Precautions: none  Pertinent History: Back Surgery, HTN,     Subjective     Rinku reporting that the heel is not doing as well. Has increased pain to 3-4 level. He also reports unexplained swelling into the L foot. No real change in his R foot as he remains with the numbness. Will see a neurosurgeon on the 22nd.   He agrees to PT session.    Pain: 3-4/10  Location: Left Achilles Tendon at lateral attachment    Outcome Measure:  Eval: LEFS 54/80 = 32.5% disability, 67.5% functional  9/19/2022: LEFS 66/80 = 17.5% disability, 82.5% functional  11/15/2022: LEFS 57/80=  28% disability, 71.2% functional  01/10/2023: LEFS 69/80= 13.7% disability, 86.3% functional    Objective     Rinku received the following treatment:     Time Activities   Manual 20 Mobilizations and distractions of the calcaneus and subtalar joint as well as forefoot; STM cross frictional and longitudinal lower leg and foot; PROM   TherAct     TherEx 10 Weighted peroneal ankle eversions   Gait     Neuro Re-ed     Modalities     E-Stim     Dry Needling 15 Dry needling with e-stim to the left achilles and low back area.   Canalith Repositioning           Home Exercises Provided and Patient Education Provided     Education provided:   - Plan of care, HEP reviewed     Written Home Exercises Provided: yes.  Exercises were reviewed and Rinku was able to demonstrate them prior to the end of the session. Rinku demonstrated good   understanding of the education provided.     Assessment     Rinku is a 56 y.o. male referred to outpatient Physical Therapy with a medical diagnosis of Achilles Tendonitis, Left leg/ankle.     Had no problems with the dry needling and intramuscular electrical stimulation.  ROM continues to be good and basically complete. With ankle eversion and he is demonstrating better peroneal strength which is 4+/5 to 5/5. Will continue working towards return to PLOF of performing all ADL without pain.    Rinku received dry needling with intramuscular stimualtion performed by one of our trained therapist.  Will continue with the plan of care.    Patient prognosis is good     Anticipated barriers to physical therapy: none    Goals: Rinku is progressing well towards his goals.    Short Term Goals: 6 weeks   Patient will report at least 20% disability reduction LEFS to indicate clinically significant functional improvement-GOAL MET  Patient will demonstrate complete ankle ROM to allow for improved left ankle biomechanics-GOAL MET  Patient will demonstrate 5/5 strength to allow return to function without imbalance-PARTIALLY MET  Patient will demonstrate independence with HEP-GOAL MET     Long Term Goals: 12 weeks   Patient will report at least 40% disability reduction LEFS to indicate clinically significant functional improvement-NEARLY MET  Patient will demonstrate return to PLOF prior to current diagnosis-NOT MET    Plan     Plan of care Certification: 8/23/2022 to 11/23/2022     Outpatient Physical Therapy 2-3 times weekly for 12 weeks to include the following interventions: Gait Training, Manual Therapy, Moist Heat/ Ice, Neuromuscular Re-ed, Patient Education, Self Care, Therapeutic Activities and Therapeutic Exercise.     Chandler Castellon, PT

## 2023-05-13 LAB
TESTOST FREE SERPL-MCNC: 61 NG/DL (ref 3.87–14.7)
TESTOST SERPL-MCNC: 1260 NG/DL (ref 240–950)

## 2023-05-15 ENCOUNTER — CLINICAL SUPPORT (OUTPATIENT)
Dept: REHABILITATION | Facility: HOSPITAL | Age: 58
End: 2023-05-15
Payer: COMMERCIAL

## 2023-05-15 ENCOUNTER — TELEPHONE (OUTPATIENT)
Dept: FAMILY MEDICINE | Facility: CLINIC | Age: 58
End: 2023-05-15
Payer: COMMERCIAL

## 2023-05-15 DIAGNOSIS — M76.62 TENDONITIS, ACHILLES, LEFT: Primary | ICD-10-CM

## 2023-05-15 DIAGNOSIS — M79.672 PAIN OF LEFT HEEL: ICD-10-CM

## 2023-05-15 DIAGNOSIS — M25.572 PAIN OF JOINT OF LEFT ANKLE AND FOOT: Primary | ICD-10-CM

## 2023-05-15 PROCEDURE — 97035 APP MDLTY 1+ULTRASOUND EA 15: CPT

## 2023-05-15 PROCEDURE — 97110 THERAPEUTIC EXERCISES: CPT

## 2023-05-15 PROCEDURE — 97014 ELECTRIC STIMULATION THERAPY: CPT

## 2023-05-15 PROCEDURE — 97140 MANUAL THERAPY 1/> REGIONS: CPT

## 2023-05-15 NOTE — TELEPHONE ENCOUNTER
----- Message from Jessenia Salcido LPN sent at 5/15/2023 11:42 AM CDT -----  Please review, that labs are within Normal limits  ----- Message -----  From: Jaylon Mishra MD  Sent: 5/15/2023  10:04 AM CDT  To: Jessenia Salcido LPN    Please inform patient of lab results, which are all within acceptable ranges.

## 2023-05-15 NOTE — PLAN OF CARE
Physical Therapy Treatment Note     Name: Rinku Herzog  [facility] Number: [id number]    Therapy Diagnosis:   No diagnosis found.    Physician: Jaylon Mishra MD    Visit Date: 5/15/2023    Physician Orders: PT Eval and Treat  Medical Diagnosis from Referral: Achilles Tendonitis, Left leg/ankle  Evaluation Date: 8/23/2022  Authorization Period Expiration: 11/23/2022  Plan of Care Expiration: 11/23/2022  Visit # / Visits authorized: 30/88     Time In: 0724  Time Out: 0824  Total Billable Time:  60 minutes    Surgery: none  Orthopedic Precautions: none  Pertinent History: Back Surgery, HTN,     Subjective     Rinku reporting that the increase in his achilles pan continues and he has been having some associated madial calf and lower l;leg pain. The swelling into his left foot continues. Pain remains 3-4 level. No real change in his R foot as he remains with the numbness. Will see a neurosurgeon on the 22nd. He agrees to PT session.    Pain: 3-4/10  Location: Left Achilles Tendon at lateral attachment    Outcome Measure:  Eval: LEFS 54/80 = 32.5% disability, 67.5% functional  9/19/2022: LEFS 66/80 = 17.5% disability, 82.5% functional  11/15/2022: LEFS 57/80=  28% disability, 71.2% functional  01/10/2023: LEFS 69/80= 13.7% disability, 86.3% functional  03/10/2023: LEFS 63/80=  21.2% disability, 78.8% functional  05/15/2023: LEFS  60/80= 25% disability, 75% functional    Objective     Rinku received the following treatment:     Time Activities   Manual 20 Mobilizations and distractions of the calcaneus and subtalar joint as well as forefoot; STM cross frictional and longitudinal lower leg and foot; PROM   TherAct     TherEx 10 Weighted peroneal ankle eversions   Gait     Neuro Re-ed     Modalities 15 Ultrasound to medial lower left leg and achilles tender spot   E-Stim 15 IFC with moist heat to the left lower area   Dry Needling     Canalith Repositioning           Home Exercises Provided and Patient Education Provided      Education provided:   - Plan of care, HEP reviewed     Written Home Exercises Provided: yes.  Exercises were reviewed and Rinku was able to demonstrate them prior to the end of the session. Rinku demonstrated good  understanding of the education provided.     Assessment     Rniku is a 56 y.o. male referred to outpatient Physical Therapy with a medical diagnosis of Achilles Tendonitis, Left leg/ankle.     Overall Rinku is better but lately progress level has been variable. ROM continues to be good and basically complete. With ankle eversion and he is demonstrating better peroneal strength which is 4+/5 to 5/5. There is some swelling in the left foot especially on top. There is some medial calf and lower leg pain that was not there previously. We did some ultrasound followed by moist heat with IFC and we will see how he responds. Our therapist trained in dry needling is out on vacation. Will continue working towards return to PLOF of performing all ADL without pain.        Patient prognosis is good     Anticipated barriers to physical therapy: none    Goals: Rinku is progressing well towards his goals.    Short Term Goals: 6 weeks   Patient will report at least 20% disability reduction LEFS to indicate clinically significant functional improvement-GOAL MET  Patient will demonstrate complete ankle ROM to allow for improved left ankle biomechanics-GOAL MET  Patient will demonstrate 5/5 strength to allow return to function without imbalance-PARTIALLY MET  Patient will demonstrate independence with HEP-GOAL MET     Long Term Goals: 12 weeks   Patient will report at least 40% disability reduction LEFS to indicate clinically significant functional improvement-NEARLY MET  Patient will demonstrate return to PLOF prior to current diagnosis-NOT MET    Plan     Plan of care Certification: 8/23/2022 to 11/23/2022     Outpatient Physical Therapy 2-3 times weekly for 12 weeks to include the following interventions: Gait Training,  Manual Therapy, Moist Heat/ Ice, Neuromuscular Re-ed, Patient Education, Self Care, Therapeutic Activities and Therapeutic Exercise.     Chandler Castellon, PT

## 2023-05-16 DIAGNOSIS — M65.272 CALCIFIC TENDONITIS OF LEFT FOOT: Primary | ICD-10-CM

## 2023-05-19 ENCOUNTER — CLINICAL SUPPORT (OUTPATIENT)
Dept: REHABILITATION | Facility: HOSPITAL | Age: 58
End: 2023-05-19
Payer: COMMERCIAL

## 2023-05-19 DIAGNOSIS — M76.62 TENDONITIS, ACHILLES, LEFT: Primary | ICD-10-CM

## 2023-05-19 DIAGNOSIS — M79.672 PAIN OF LEFT HEEL: ICD-10-CM

## 2023-05-19 PROCEDURE — 97140 MANUAL THERAPY 1/> REGIONS: CPT

## 2023-05-19 PROCEDURE — 97014 ELECTRIC STIMULATION THERAPY: CPT

## 2023-05-19 PROCEDURE — 97035 APP MDLTY 1+ULTRASOUND EA 15: CPT

## 2023-05-19 PROCEDURE — 97110 THERAPEUTIC EXERCISES: CPT

## 2023-05-19 NOTE — PLAN OF CARE
Physical Therapy Treatment Note     Name: Rinku Herzog  [facility] Number: [id number]    Therapy Diagnosis:   No diagnosis found.    Physician: Virgil Lujan MD    Visit Date: 5/19/2023    Physician Orders: PT Eval and Treat  Medical Diagnosis from Referral: Achilles Tendonitis, Left leg/ankle  Evaluation Date: 8/23/2022  Authorization Period Expiration: 11/23/2022  Plan of Care Expiration: 11/23/2022  Visit # / Visits authorized: 31/88     Time In: 0724  Time Out: 0824  Total Billable Time:  60 minutes    Surgery: none  Orthopedic Precautions: none  Pertinent History: Back Surgery, HTN,     Subjective     Rinku reports that the swelling in his foot has been 18 days now. Feels that the swelling appeared to be gone down a little bit but got stung by some wasps on the lower leg and the swelling went back up. He is allergic to bee stings so went to the walk in clinic.  Pain remains 3-4 level. Having some medial lower leg pain/tenderness with palpation. No real change in his R foot as he remains with the numbness. Will see a neurosurgeon on the 22nd. He agrees to PT session.    Pain: 3-4/10  Location: Left Achilles Tendon at lateral attachment    Outcome Measure:  Eval: LEFS 54/80 = 32.5% disability, 67.5% functional  9/19/2022: LEFS 66/80 = 17.5% disability, 82.5% functional  11/15/2022: LEFS 57/80=  28% disability, 71.2% functional  01/10/2023: LEFS 69/80= 13.7% disability, 86.3% functional  03/10/2023: LEFS 63/80=  21.2% disability, 78.8% functional  05/15/2023: LEFS  60/80= 25% disability, 75% functional    Objective     Rinku received the following treatment:     Time Activities   Manual 20 IASTM, mobilizations and distractions of the calcaneus and subtalar joint as well as forefoot; STM cross frictional and longitudinal lower leg and foot; PROM   TherAct     TherEx 10 Weighted peroneal ankle eversions   Gait     Neuro Re-ed     Modalities 15 Ultrasound to medial lower left leg and achilles tender spot   E-Stim  15 IFC with moist heat to the left lower area   Dry Needling     Canalith Repositioning           Home Exercises Provided and Patient Education Provided     Education provided:   - Plan of care, HEP reviewed     Written Home Exercises Provided: yes.  Exercises were reviewed and Rinku was able to demonstrate them prior to the end of the session. Rinku demonstrated good  understanding of the education provided.     Assessment     Rinku is a 56 y.o. male referred to outpatient Physical Therapy with a medical diagnosis of Achilles Tendonitis, Left leg/ankle.     Overall Rinku is better but lately progress level has been variable. ROM continues to be good and basically complete. With ankle eversion and he is demonstrating better peroneal strength which is 4+/5 to 5/5. There is some swelling in the left foot especially on top. There is some medial calf and lower leg pain to palpation.  We continued with ultrasound followed by ice pack with IFC. Felt pretty good afterwards. Will continue working towards return to PLOF of performing all ADL without pain.        Patient prognosis is good     Anticipated barriers to physical therapy: none    Goals: Rinku is progressing well towards his goals.    Short Term Goals: 6 weeks   Patient will report at least 20% disability reduction LEFS to indicate clinically significant functional improvement-GOAL MET  Patient will demonstrate complete ankle ROM to allow for improved left ankle biomechanics-GOAL MET  Patient will demonstrate 5/5 strength to allow return to function without imbalance-PARTIALLY MET  Patient will demonstrate independence with HEP-GOAL MET     Long Term Goals: 12 weeks   Patient will report at least 40% disability reduction LEFS to indicate clinically significant functional improvement-NEARLY MET  Patient will demonstrate return to PLOF prior to current diagnosis-NOT MET    Plan     Plan of care Certification: 8/23/2022 to 11/23/2022     Outpatient Physical Therapy 2-3  times weekly for 12 weeks to include the following interventions: Gait Training, Manual Therapy, Moist Heat/ Ice, Neuromuscular Re-ed, Patient Education, Self Care, Therapeutic Activities and Therapeutic Exercise.     Chandler Castellon, PT

## 2023-05-23 ENCOUNTER — CLINICAL SUPPORT (OUTPATIENT)
Dept: REHABILITATION | Facility: HOSPITAL | Age: 58
End: 2023-05-23
Payer: COMMERCIAL

## 2023-05-23 DIAGNOSIS — M76.62 TENDONITIS, ACHILLES, LEFT: Primary | ICD-10-CM

## 2023-05-23 DIAGNOSIS — M79.672 PAIN OF LEFT HEEL: ICD-10-CM

## 2023-05-23 PROCEDURE — 97110 THERAPEUTIC EXERCISES: CPT

## 2023-05-23 PROCEDURE — 97140 MANUAL THERAPY 1/> REGIONS: CPT

## 2023-05-23 NOTE — PLAN OF CARE
Physical Therapy Treatment Note     Name: Rinku Herzog  [facility] Number: [id number]    Therapy Diagnosis:   No diagnosis found.    Physician: Virgil Lujan MD    Visit Date: 5/23/2023    Physician Orders: PT Eval and Treat  Medical Diagnosis from Referral: Achilles Tendonitis, Left leg/ankle  Evaluation Date: 8/23/2022  Authorization Period Expiration: 11/23/2022  Plan of Care Expiration: 11/23/2022  Visit # / Visits authorized: 32/88     Time In: 0728  Time Out: 0826  Total Billable Time:  58 minutes    Surgery: none  Orthopedic Precautions: none  Pertinent History: Back Surgery, HTN,     Subjective     Rinku reports that the swelling in his foot remains about the same. Pain remains 3-4 level. Having some medial lower leg pain/tenderness with palpation. No real change in his R foot as he remains with the numbness. Saw the neurosurgeon yesterday and has plans for advanced imaging and nerve conduction. Does not believe in putting hardware in the absence of back pain. Rinku only having some leg pain. He agrees to PT session.    Pain: 3-4/10  Location: Left Achilles Tendon at lateral attachment    Outcome Measure:  Eval: LEFS 54/80 = 32.5% disability, 67.5% functional  9/19/2022: LEFS 66/80 = 17.5% disability, 82.5% functional  11/15/2022: LEFS 57/80=  28% disability, 71.2% functional  01/10/2023: LEFS 69/80= 13.7% disability, 86.3% functional  03/10/2023: LEFS 63/80=  21.2% disability, 78.8% functional  05/15/2023: LEFS  60/80= 25% disability, 75% functional    Objective     Rinku received the following treatment:     Time Activities   Manual 45 IASTM, mobilizations and distractions of the calcaneus and subtalar joint as well as forefoot; STM cross frictional and longitudinal lower leg and foot; PROM; Dry needling with e-stim to the left achilles and lower leg. This was done by one our trained therapist.   TherAct     TherEx 10 Weighted peroneal ankle eversions   Gait     Neuro Re-ed     Modalities     E-Stim      Dry Needling     Canalith Repositioning           Home Exercises Provided and Patient Education Provided     Education provided:   - Plan of care, HEP reviewed     Written Home Exercises Provided: yes.  Exercises were reviewed and Rinku was able to demonstrate them prior to the end of the session. Rinku demonstrated good  understanding of the education provided.     Assessment     Rinku is a 56 y.o. male referred to outpatient Physical Therapy with a medical diagnosis of Achilles Tendonitis, Left leg/ankle.     Overall Rinku is better but lately not as good as he had been. Pain has increased to 3-4 level. ROM continues to be good and basically complete. With ankle eversion and he is demonstrating better peroneal strength which is 4+/5 to 5/5. The swelling persists in the left foot especially on top. There is some medial calf and lower leg pain to palpation. He continues to work on calf stretching and eccentric heel drops on his own. Will continue working towards return to PLOF of performing all ADL without pain.        Patient prognosis is good     Anticipated barriers to physical therapy: none    Goals: Rinku is progressing well towards his goals.    Short Term Goals: 6 weeks   Patient will report at least 20% disability reduction LEFS to indicate clinically significant functional improvement-GOAL MET  Patient will demonstrate complete ankle ROM to allow for improved left ankle biomechanics-GOAL MET  Patient will demonstrate 5/5 strength to allow return to function without imbalance-PARTIALLY MET  Patient will demonstrate independence with HEP-GOAL MET     Long Term Goals: 12 weeks   Patient will report at least 40% disability reduction LEFS to indicate clinically significant functional improvement-NEARLY MET  Patient will demonstrate return to PLOF prior to current diagnosis-NOT MET    Plan     Plan of care Certification: 8/23/2022 to 11/23/2022     Outpatient Physical Therapy 2-3 times weekly for 12 weeks to include  the following interventions: Gait Training, Manual Therapy, Moist Heat/ Ice, Neuromuscular Re-ed, Patient Education, Self Care, Therapeutic Activities and Therapeutic Exercise.     Chandler Castellon, PT

## 2023-06-02 ENCOUNTER — CLINICAL SUPPORT (OUTPATIENT)
Dept: REHABILITATION | Facility: HOSPITAL | Age: 58
End: 2023-06-02
Payer: COMMERCIAL

## 2023-06-02 DIAGNOSIS — M76.62 TENDONITIS, ACHILLES, LEFT: Primary | ICD-10-CM

## 2023-06-02 DIAGNOSIS — M79.672 PAIN OF LEFT HEEL: ICD-10-CM

## 2023-06-02 PROCEDURE — 97140 MANUAL THERAPY 1/> REGIONS: CPT

## 2023-06-02 PROCEDURE — 97110 THERAPEUTIC EXERCISES: CPT

## 2023-06-02 NOTE — PLAN OF CARE
Physical Therapy Treatment Note     Name: Rinku Herzog  [facility] Number: [id number]    Therapy Diagnosis:   No diagnosis found.    Physician: Virgil Lujan MD    Visit Date: 6/2/2023    Physician Orders: PT Eval and Treat  Medical Diagnosis from Referral: Achilles Tendonitis, Left leg/ankle  Evaluation Date: 8/23/2022  Authorization Period Expiration: 11/23/2022  Plan of Care Expiration: 11/23/2022  Visit # / Visits authorized: 33/88     Time In: 0714  Time Out: 0809  Total Billable Time:  55 minutes    Surgery: none  Orthopedic Precautions: none  Pertinent History: Back Surgery, HTN,     Subjective     Rinku reports that the swelling in his left foot continues without any real change but does have less swelling at the ankle. Having no foot pain except for one spot in the arch of the foot. Heel pain doing pretty good. No change in the numbness in the right foot. Is scheduled for advanced type of CT scan. He agrees to PT session.    Pain: 3-4/10  Location: Left Achilles Tendon at lateral attachment    Outcome Measure:  Eval: LEFS 54/80 = 32.5% disability, 67.5% functional  9/19/2022: LEFS 66/80 = 17.5% disability, 82.5% functional  11/15/2022: LEFS 57/80=  28% disability, 71.2% functional  01/10/2023: LEFS 69/80= 13.7% disability, 86.3% functional  03/10/2023: LEFS 63/80=  21.2% disability, 78.8% functional  05/15/2023: LEFS  60/80= 25% disability, 75% functional    Objective     Rinku received the following treatment:     Time Activities   Manual 45 IASTM, mobilizations and distractions of the calcaneus and subtalar joint as well as forefoot; STM cross frictional and longitudinal lower leg and foot; PROM; Dry needling with e-stim to the left achilles and arch of the foot. This was done by one our trained therapist.   TherAct     TherEx 10 Weighted peroneal ankle eversions, seated calf raises with kettlebell   Gait     Neuro Re-ed     Modalities     E-Stim     Dry Needling     Canalith Repositioning       **Dry  needling was done by one our trained therapist.    Home Exercises Provided and Patient Education Provided     Education provided:   - Plan of care, HEP reviewed     Written Home Exercises Provided: yes.  Exercises were reviewed and Rinku was able to demonstrate them prior to the end of the session. Rinku demonstrated good  understanding of the education provided.     Assessment     Rinku is a 56 y.o. male referred to outpatient Physical Therapy with a medical diagnosis of Achilles Tendonitis, Left leg/ankle.     Left Achilles tendon is better but there is some concern for the persistent foot swelling and the new pain in the arch of the foot. Ankle ROM continues to be good and basically complete. With ankle eversion and he is demonstrating better peroneal strength which is 4+/5 to 5/5. Calf raises with the kettle bell is no problem for him. He is continuing to work on calf stretching and eccentric heel drops on his own. Will continue working towards return to PLOF of performing all ADL without pain.        Patient prognosis is good     Anticipated barriers to physical therapy: none    Goals: Rinku is progressing well towards his goals.    Short Term Goals: 6 weeks   Patient will report at least 20% disability reduction LEFS to indicate clinically significant functional improvement-GOAL MET  Patient will demonstrate complete ankle ROM to allow for improved left ankle biomechanics-GOAL MET  Patient will demonstrate 5/5 strength to allow return to function without imbalance-PARTIALLY MET  Patient will demonstrate independence with HEP-GOAL MET     Long Term Goals: 12 weeks   Patient will report at least 40% disability reduction LEFS to indicate clinically significant functional improvement-NEARLY MET  Patient will demonstrate return to PLOF prior to current diagnosis-NOT MET    Plan     Plan of care Certification: 8/23/2022 to 11/23/2022     Outpatient Physical Therapy 2-3 times weekly for 12 weeks to include the following  interventions: Gait Training, Manual Therapy, Moist Heat/ Ice, Neuromuscular Re-ed, Patient Education, Self Care, Therapeutic Activities and Therapeutic Exercise.     Chandler Castellon, PT

## 2023-06-05 ENCOUNTER — CLINICAL SUPPORT (OUTPATIENT)
Dept: REHABILITATION | Facility: HOSPITAL | Age: 58
End: 2023-06-05
Payer: COMMERCIAL

## 2023-06-05 DIAGNOSIS — M76.62 TENDONITIS, ACHILLES, LEFT: Primary | ICD-10-CM

## 2023-06-05 DIAGNOSIS — M79.672 PAIN OF LEFT HEEL: ICD-10-CM

## 2023-06-05 PROCEDURE — 97032 APPL MODALITY 1+ESTIM EA 15: CPT

## 2023-06-05 PROCEDURE — 97110 THERAPEUTIC EXERCISES: CPT

## 2023-06-05 PROCEDURE — 97140 MANUAL THERAPY 1/> REGIONS: CPT

## 2023-06-05 NOTE — PLAN OF CARE
Physical Therapy Treatment Note     Name: Rinku Herzog  [facility] Number: [id number]    Therapy Diagnosis:   No diagnosis found.    Physician: Virgil Lujan MD    Visit Date: 6/5/2023    Physician Orders: PT Eval and Treat  Medical Diagnosis from Referral: Achilles Tendonitis, Left leg/ankle  Evaluation Date: 8/23/2022  Authorization Period Expiration: 11/23/2022  Plan of Care Expiration: 11/23/2022  Visit # / Visits authorized: 34/88     Time In: 0729  Time Out: 0834  Total Billable Time:  65 minutes    Surgery: none  Orthopedic Precautions: none  Pertinent History: Back Surgery, HTN,     Subjective     Rinku comes to therapy reporting that the heel is not too bad today. Has been having some back spasms. Reports that the swelling in his left foot continues without any real change. Remains with a little pain L arch of his foot. No change in the numbness in the right foot. Is scheduled for advanced type of CT scan. Going out of town on vacation tomorrow. He agrees to PT session.    Pain: 1-2/10  Location: Left Achilles Tendon at lateral attachment    Outcome Measure:  Eval: LEFS 54/80 = 32.5% disability, 67.5% functional  9/19/2022: LEFS 66/80 = 17.5% disability, 82.5% functional  11/15/2022: LEFS 57/80=  28% disability, 71.2% functional  01/10/2023: LEFS 69/80= 13.7% disability, 86.3% functional  03/10/2023: LEFS 63/80=  21.2% disability, 78.8% functional  05/15/2023: LEFS  60/80= 25% disability, 75% functional    Objective     Rinku received the following treatment:     Time Activities   Manual 40 IASTM, mobilizations and distractions of the calcaneus and subtalar joint as well as forefoot; STM cross frictional and longitudinal lower leg and foot; PROM; Dry needling with e-stim to the left achilles and arch of the foot. This was done by one our trained therapist.   TherAct     TherEx 10 Weighted peroneal ankle eversions, seated calf raises with kettlebell   Gait     Neuro Re-ed     Modalities 15 IFC with moist  heat to the R lumbar area   E-Stim     Dry Needling     Canalith Repositioning       **Dry needling was done by one our trained therapist.    Home Exercises Provided and Patient Education Provided     Education provided:   - Plan of care, HEP reviewed     Written Home Exercises Provided: yes.  Exercises were reviewed and Rinku was able to demonstrate them prior to the end of the session. Rinku demonstrated good  understanding of the education provided.     Assessment     Rinku is a 56 y.o. male referred to outpatient Physical Therapy with a medical diagnosis of Achilles Tendonitis, Left leg/ankle.     Left Achilles tendon is better but there is persistent foot swelling and some pain in the arch of the foot. Ankle ROM continues to be good and basically complete. With ankle eversion and he is demonstrating better peroneal strength which is 4+/5 to 5/5. Calf raises with the kettle bell is no problem for him. We continued with calf stretching and eccentric heel drops. There is a little pronated shoe deformity which might indicate him walking on his lateral foot. Did apply some IFC e-stim with heat for his lumbar pain. Going for CT scan in July. Will continue working towards return to PLOF of performing all ADL without pain.        Patient prognosis is good     Anticipated barriers to physical therapy: none    Goals: Rinku is progressing well towards his goals.    Short Term Goals: 6 weeks   Patient will report at least 20% disability reduction LEFS to indicate clinically significant functional improvement-GOAL MET  Patient will demonstrate complete ankle ROM to allow for improved left ankle biomechanics-GOAL MET  Patient will demonstrate 5/5 strength to allow return to function without imbalance-PARTIALLY MET  Patient will demonstrate independence with HEP-GOAL MET     Long Term Goals: 12 weeks   Patient will report at least 40% disability reduction LEFS to indicate clinically significant functional improvement-NEARLY  MET  Patient will demonstrate return to PLOF prior to current diagnosis-NOT MET    Plan     Plan of care Certification: 8/23/2022 to 11/23/2022     Outpatient Physical Therapy 2-3 times weekly for 12 weeks to include the following interventions: Gait Training, Manual Therapy, Moist Heat/ Ice, Neuromuscular Re-ed, Patient Education, Self Care, Therapeutic Activities and Therapeutic Exercise.     Chandler Castellon, PT

## 2023-06-13 ENCOUNTER — CLINICAL SUPPORT (OUTPATIENT)
Dept: REHABILITATION | Facility: HOSPITAL | Age: 58
End: 2023-06-13
Payer: COMMERCIAL

## 2023-06-13 DIAGNOSIS — M76.62 TENDONITIS, ACHILLES, LEFT: Primary | ICD-10-CM

## 2023-06-13 PROCEDURE — 97140 MANUAL THERAPY 1/> REGIONS: CPT

## 2023-06-13 PROCEDURE — 97110 THERAPEUTIC EXERCISES: CPT

## 2023-06-13 NOTE — PLAN OF CARE
"  Physical Therapy Treatment Note     Name: Rinku Herzog  [facility] Number: [id number]    Therapy Diagnosis:   Encounter Diagnosis   Name Primary?    Tendonitis, Achilles, left Yes       Physician: Virgil Lujan MD    Visit Date: 6/13/2023    Physician Orders: PT Eval and Treat  Medical Diagnosis from Referral: Achilles Tendonitis, Left leg/ankle  Evaluation Date: 8/23/2022  Authorization Period Expiration: 11/23/2022  Plan of Care Expiration: 11/23/2022  Visit # / Visits authorized: 35/88     Time In: 0725  Time Out: 0805  Total Billable Time:  40 minutes    Surgery: none  Orthopedic Precautions: none  Pertinent History: Back Surgery, HTN,     Subjective     Rinku comes to therapy reporting that the heel is not too bad today. Reports he has been having "lots of other things happening" which is worse than heel. Reports that the swelling in his left foot continues without any real change. Remains with a little pain L arch of his foot. No change in the numbness in the right foot but is now scheudled for a nerve conduction test, myelogram and follow-up with Dr. Sparks on July 10th. He agrees to PT session.    Pain: 2/10  Location: Left Achilles Tendon at lateral attachment    Outcome Measure:  Eval: LEFS 54/80 = 32.5% disability, 67.5% functional  9/19/2022: LEFS 66/80 = 17.5% disability, 82.5% functional  11/15/2022: LEFS 57/80=  28% disability, 71.2% functional  01/10/2023: LEFS 69/80= 13.7% disability, 86.3% functional  03/10/2023: LEFS 63/80=  21.2% disability, 78.8% functional  05/15/2023: LEFS  60/80= 25% disability, 75% functional    Objective     Rinku received the following treatment:     Time Activities   Manual 30 IASTM, mobilizations and distractions of the calcaneus and subtalar joint as well as forefoot; STM cross frictional and longitudinal lower leg and foot; PROM; Dry needling with e-stim to the left achilles and arch of the foot. This was done by one our trained therapist.   TherAct     TherEx 10 " Weighted peroneal ankle eversions, seated calf raises with kettlebell   Gait     Neuro Re-ed     Modalities     E-Stim     Dry Needling     Canalith Repositioning       **Dry needling was done by one our trained therapist.    Home Exercises Provided and Patient Education Provided     Education provided:   - Plan of care, HEP reviewed     Written Home Exercises Provided: yes.  Exercises were reviewed and Rinku was able to demonstrate them prior to the end of the session. Rinku demonstrated good  understanding of the education provided.     Assessment     Rinku is a 56 y.o. male referred to outpatient Physical Therapy with a medical diagnosis of Achilles Tendonitis, Left leg/ankle.     Left Achilles tendon is better but there is persistent foot swelling and some pain in the arch of the foot. Ankle ROM continues to be good and basically complete. With ankle eversion and he is demonstrating better peroneal strength which is 4+/5 to 5/5. Calf raises with the kettle bell is no problem for him. Going for  nerve conduction test and myelogram with f/u with Dr. Sparks in July. Feels as though he is able to maintain heel pain but not really making it better or worse given other concerns he is having with other areas.  Will continue working towards return to PLOF of performing all ADL without pain.        Patient prognosis is good     Anticipated barriers to physical therapy: none    Goals: Rinku is progressing well towards his goals.    Short Term Goals: 6 weeks   Patient will report at least 20% disability reduction LEFS to indicate clinically significant functional improvement-GOAL MET  Patient will demonstrate complete ankle ROM to allow for improved left ankle biomechanics-GOAL MET  Patient will demonstrate 5/5 strength to allow return to function without imbalance-PARTIALLY MET  Patient will demonstrate independence with HEP-GOAL MET     Long Term Goals: 12 weeks   Patient will report at least 40% disability reduction LEFS  to indicate clinically significant functional improvement-NEARLY MET  Patient will demonstrate return to PLOF prior to current diagnosis-NOT MET    Plan     Plan of care Certification: 8/23/2022 to 11/23/2022     Outpatient Physical Therapy 2-3 times weekly for 12 weeks to include the following interventions: Gait Training, Manual Therapy, Moist Heat/ Ice, Neuromuscular Re-ed, Patient Education, Self Care, Therapeutic Activities and Therapeutic Exercise.     Madan Benavides, PT

## 2023-06-15 ENCOUNTER — OFFICE VISIT (OUTPATIENT)
Dept: FAMILY MEDICINE | Facility: CLINIC | Age: 58
End: 2023-06-15
Payer: COMMERCIAL

## 2023-06-15 VITALS
WEIGHT: 227 LBS | SYSTOLIC BLOOD PRESSURE: 128 MMHG | OXYGEN SATURATION: 98 % | DIASTOLIC BLOOD PRESSURE: 60 MMHG | HEART RATE: 77 BPM | BODY MASS INDEX: 30.75 KG/M2 | TEMPERATURE: 97 F | RESPIRATION RATE: 18 BRPM | HEIGHT: 72 IN

## 2023-06-15 DIAGNOSIS — R10.11 RIGHT UPPER QUADRANT ABDOMINAL PAIN: ICD-10-CM

## 2023-06-15 DIAGNOSIS — M54.2 NECK PAIN: Primary | ICD-10-CM

## 2023-06-15 DIAGNOSIS — Z13.1 SCREENING FOR DIABETES MELLITUS: ICD-10-CM

## 2023-06-15 LAB — HBA1C MFR BLD: 5.3 %

## 2023-06-15 PROCEDURE — 4010F PR ACE/ARB THEARPY RXD/TAKEN: ICD-10-PCS | Mod: CPTII,,, | Performed by: FAMILY MEDICINE

## 2023-06-15 PROCEDURE — 3078F DIAST BP <80 MM HG: CPT | Mod: CPTII,,, | Performed by: FAMILY MEDICINE

## 2023-06-15 PROCEDURE — 99214 PR OFFICE/OUTPT VISIT, EST, LEVL IV, 30-39 MIN: ICD-10-PCS | Mod: ,,, | Performed by: FAMILY MEDICINE

## 2023-06-15 PROCEDURE — 83036 HEMOGLOBIN GLYCOSYLATED A1C: CPT | Mod: QW,,, | Performed by: FAMILY MEDICINE

## 2023-06-15 PROCEDURE — 3074F SYST BP LT 130 MM HG: CPT | Mod: CPTII,,, | Performed by: FAMILY MEDICINE

## 2023-06-15 PROCEDURE — 1159F MED LIST DOCD IN RCRD: CPT | Mod: CPTII,,, | Performed by: FAMILY MEDICINE

## 2023-06-15 PROCEDURE — 3008F PR BODY MASS INDEX (BMI) DOCUMENTED: ICD-10-PCS | Mod: CPTII,,, | Performed by: FAMILY MEDICINE

## 2023-06-15 PROCEDURE — 3044F HG A1C LEVEL LT 7.0%: CPT | Mod: CPTII,,, | Performed by: FAMILY MEDICINE

## 2023-06-15 PROCEDURE — 1159F PR MEDICATION LIST DOCUMENTED IN MEDICAL RECORD: ICD-10-PCS | Mod: CPTII,,, | Performed by: FAMILY MEDICINE

## 2023-06-15 PROCEDURE — 3078F PR MOST RECENT DIASTOLIC BLOOD PRESSURE < 80 MM HG: ICD-10-PCS | Mod: CPTII,,, | Performed by: FAMILY MEDICINE

## 2023-06-15 PROCEDURE — 1160F PR REVIEW ALL MEDS BY PRESCRIBER/CLIN PHARMACIST DOCUMENTED: ICD-10-PCS | Mod: CPTII,,, | Performed by: FAMILY MEDICINE

## 2023-06-15 PROCEDURE — 1160F RVW MEDS BY RX/DR IN RCRD: CPT | Mod: CPTII,,, | Performed by: FAMILY MEDICINE

## 2023-06-15 PROCEDURE — 99214 OFFICE O/P EST MOD 30 MIN: CPT | Mod: ,,, | Performed by: FAMILY MEDICINE

## 2023-06-15 PROCEDURE — 4010F ACE/ARB THERAPY RXD/TAKEN: CPT | Mod: CPTII,,, | Performed by: FAMILY MEDICINE

## 2023-06-15 PROCEDURE — 3044F PR MOST RECENT HEMOGLOBIN A1C LEVEL <7.0%: ICD-10-PCS | Mod: CPTII,,, | Performed by: FAMILY MEDICINE

## 2023-06-15 PROCEDURE — 3008F BODY MASS INDEX DOCD: CPT | Mod: CPTII,,, | Performed by: FAMILY MEDICINE

## 2023-06-15 PROCEDURE — 83036 POCT HEMOGLOBIN A1C: ICD-10-PCS | Mod: QW,,, | Performed by: FAMILY MEDICINE

## 2023-06-15 PROCEDURE — 3074F PR MOST RECENT SYSTOLIC BLOOD PRESSURE < 130 MM HG: ICD-10-PCS | Mod: CPTII,,, | Performed by: FAMILY MEDICINE

## 2023-06-15 RX ORDER — MELOXICAM 15 MG/1
15 TABLET ORAL DAILY
COMMUNITY

## 2023-06-15 NOTE — PROGRESS NOTES
Subjective:       Patient ID: Rinku Herzog is a 57 y.o. male.    Chief Complaint: back & neck pain      Back/neck pain      Review of Systems   Constitutional: Negative.    Respiratory: Negative.     Cardiovascular: Negative.    Gastrointestinal:  Positive for abdominal pain (RUQ, hx of hepatic cysts). Negative for nausea and vomiting.   Musculoskeletal:         Back/neck pain: followed by Dr Lira, scheduled for myelogram         Objective:      /60 (BP Location: Right arm, Patient Position: Sitting, BP Method: Large (Manual))   Pulse 77   Temp 97 °F (36.1 °C)   Resp 18   Ht 6' (1.829 m)   Wt 103 kg (227 lb)   SpO2 98%   BMI 30.79 kg/m²    Physical Exam  Constitutional:       Appearance: Normal appearance.   Cardiovascular:      Rate and Rhythm: Normal rate and regular rhythm.      Heart sounds: Normal heart sounds.   Pulmonary:      Effort: Pulmonary effort is normal.      Breath sounds: Normal breath sounds.   Musculoskeletal:      Comments: C-spine  Flexion equals 90°  Extension equals 60°  Rotation equals 80° bilaterally  Lateral flexion equals 45° bilaterally    Neurological:      Mental Status: He is alert.   Psychiatric:         Mood and Affect: Mood normal.         Behavior: Behavior normal.         Thought Content: Thought content normal.         Judgment: Judgment normal.             Assessment:       Problem List Items Addressed This Visit    None  Visit Diagnoses       Neck pain    -  Primary    Relevant Orders    X-Ray Cervical Spine AP And Lateral    Screening for diabetes mellitus        Relevant Orders    POCT HEMOGLOBIN A1C (Completed)    Right upper quadrant abdominal pain        Relevant Orders    US Abdomen Limited               Plan:   1. Neck pain  -     X-Ray Cervical Spine AP And Lateral; Future; Expected date: 06/15/2023  Schedule C-spine x-ray  Continue current medication  Monitor   Return to clinic with any concerns     2. Screening for diabetes mellitus  -     POCT  HEMOGLOBIN A1C    3. Right upper quadrant abdominal pain  -     US Abdomen Limited; Future; Expected date: 06/15/2023  Schedule right upper quadrant ultrasound

## 2023-06-16 ENCOUNTER — CLINICAL SUPPORT (OUTPATIENT)
Dept: REHABILITATION | Facility: HOSPITAL | Age: 58
End: 2023-06-16
Payer: COMMERCIAL

## 2023-06-16 ENCOUNTER — HOSPITAL ENCOUNTER (OUTPATIENT)
Dept: RADIOLOGY | Facility: HOSPITAL | Age: 58
Discharge: HOME | End: 2023-06-16
Attending: FAMILY MEDICINE
Payer: COMMERCIAL

## 2023-06-16 DIAGNOSIS — M76.62 TENDONITIS, ACHILLES, LEFT: Primary | ICD-10-CM

## 2023-06-16 DIAGNOSIS — M54.2 NECK PAIN: ICD-10-CM

## 2023-06-16 DIAGNOSIS — R10.11 RIGHT UPPER QUADRANT ABDOMINAL PAIN: ICD-10-CM

## 2023-06-16 DIAGNOSIS — M79.672 PAIN OF LEFT HEEL: ICD-10-CM

## 2023-06-16 PROCEDURE — 72040 X-RAY EXAM NECK SPINE 2-3 VW: CPT | Mod: TC

## 2023-06-16 PROCEDURE — 97140 MANUAL THERAPY 1/> REGIONS: CPT

## 2023-06-16 PROCEDURE — 76705 ECHO EXAM OF ABDOMEN: CPT | Mod: TC

## 2023-06-16 PROCEDURE — 97110 THERAPEUTIC EXERCISES: CPT

## 2023-06-16 NOTE — PLAN OF CARE
Physical Therapy Treatment Note     Name: Rinku Herzog  [facility] Number: [id number]    Therapy Diagnosis:   No diagnosis found.      Physician: No ref. provider found    Visit Date: 6/16/2023    Physician Orders: PT Eval and Treat  Medical Diagnosis from Referral: Achilles Tendonitis, Left leg/ankle  Evaluation Date: 8/23/2022  Authorization Period Expiration: 11/23/2022  Plan of Care Expiration: 11/23/2022  Visit # / Visits authorized: 36/88     Time In: 0716  Time Out: 0805  Total Billable Time:  40 minutes    Surgery: none  Orthopedic Precautions: none  Pertinent History: Back Surgery, HTN,     Subjective     Rinku comes to therapy reporting that he had an MRI of the left foot and shows tendonitis. Reports that the swelling in his left foot continues without any real change. No complaints of L arch of his foot today. No change in the numbness in the right foot but is now scheudled for a nerve conduction test, myelogram and follow-up with Dr. Sparks on July 10th. He agrees to PT session.    Pain: 2/10  Location: Left Achilles Tendon at lateral attachment    Outcome Measure:  Eval: LEFS 54/80 = 32.5% disability, 67.5% functional  9/19/2022: LEFS 66/80 = 17.5% disability, 82.5% functional  11/15/2022: LEFS 57/80=  28% disability, 71.2% functional  01/10/2023: LEFS 69/80= 13.7% disability, 86.3% functional  03/10/2023: LEFS 63/80=  21.2% disability, 78.8% functional  05/15/2023: LEFS  60/80= 25% disability, 75% functional  06/16/2023: LEFS  59/80= 26.25% disability, 73.75% functional    Objective     Rinku received the following treatment:     Time Activities   Manual 38 IASTM, mobilizations and distractions of the calcaneus and subtalar joint as well as forefoot; STM cross frictional and longitudinal lower leg and foot; PROM; Dry needling with e-stim to the left achilles and arch of the foot. This was done by one our trained therapist.   TherAct     TherEx 10 Weighted peroneal ankle eversions, seated calf raises with  kirill   Gait     Neuro Re-ed     Modalities     E-Stim     Dry Needling     Canalith Repositioning       **Dry needling was done by one our trained therapist.    Home Exercises Provided and Patient Education Provided     Education provided:   - Plan of care, HEP reviewed     Written Home Exercises Provided: yes.  Exercises were reviewed and Rinku was able to demonstrate them prior to the end of the session. Rinku demonstrated good  understanding of the education provided.     Assessment     Rinku is a 56 y.o. male referred to outpatient Physical Therapy with a medical diagnosis of Achilles Tendonitis, Left leg/ankle.     Left Achilles tendon is better but there is persistent foot swelling and some pain in the arch of the foot. Ankle ROM continues to be good and basically complete. With ankle eversion and he is demonstrating better peroneal strength which is 4+/5 to 5/5. Calf raises with the kettle bell is no problem for him. Going for  nerve conduction test and myelogram with f/u with Dr. Sparks in July. Feels as though he is able to maintain heel pain but not really making it better or worse given other concerns he is having with other areas.  Will continue working towards return to PLOF of performing all ADL without pain.        Patient prognosis is good     Anticipated barriers to physical therapy: none    Goals: Rinku is progressing well towards his goals.    Short Term Goals: 6 weeks   Patient will report at least 20% disability reduction LEFS to indicate clinically significant functional improvement-GOAL MET  Patient will demonstrate complete ankle ROM to allow for improved left ankle biomechanics-GOAL MET  Patient will demonstrate 5/5 strength to allow return to function without imbalance-PARTIALLY MET  Patient will demonstrate independence with HEP-GOAL MET     Long Term Goals: 12 weeks   Patient will report at least 40% disability reduction LEFS to indicate clinically significant functional  improvement-NEARLY MET  Patient will demonstrate return to PLOF prior to current diagnosis-NOT MET    Plan     Plan of care Certification: 8/23/2022 to 11/23/2022     Outpatient Physical Therapy 2-3 times weekly for 12 weeks to include the following interventions: Gait Training, Manual Therapy, Moist Heat/ Ice, Neuromuscular Re-ed, Patient Education, Self Care, Therapeutic Activities and Therapeutic Exercise.     Chandler Csatellon, PT

## 2023-06-26 ENCOUNTER — TELEPHONE (OUTPATIENT)
Dept: FAMILY MEDICINE | Facility: CLINIC | Age: 58
End: 2023-06-26
Payer: COMMERCIAL

## 2023-06-26 NOTE — TELEPHONE ENCOUNTER
----- Message from Jaylon Mishra MD sent at 6/26/2023 12:03 PM CDT -----  Please inform patient of lab results, which are all within acceptable ranges.

## 2023-07-07 ENCOUNTER — CLINICAL SUPPORT (OUTPATIENT)
Dept: REHABILITATION | Facility: HOSPITAL | Age: 58
End: 2023-07-07
Payer: COMMERCIAL

## 2023-07-07 DIAGNOSIS — M76.62 TENDONITIS, ACHILLES, LEFT: Primary | ICD-10-CM

## 2023-07-07 DIAGNOSIS — M79.672 PAIN OF LEFT HEEL: ICD-10-CM

## 2023-07-07 PROCEDURE — 97140 MANUAL THERAPY 1/> REGIONS: CPT

## 2023-07-07 PROCEDURE — 97035 APP MDLTY 1+ULTRASOUND EA 15: CPT

## 2023-07-07 PROCEDURE — 97110 THERAPEUTIC EXERCISES: CPT

## 2023-07-07 NOTE — PLAN OF CARE
Physical Therapy Treatment Note     Name: Rinku Herzog  [facility] Number: [id number]    Therapy Diagnosis:   No diagnosis found.      Physician: Virgil Lujan MD    Visit Date: 7/7/2023    Physician Orders: PT Eval and Treat  Medical Diagnosis from Referral: Achilles Tendonitis, Left leg/ankle  Evaluation Date: 8/23/2022  Authorization Period Expiration: 11/23/2022  Plan of Care Expiration: 11/23/2022  Visit # / Visits authorized: 37/88     Time In: 0730  Time Out: 0830  Total Billable Time:  60 minutes    Surgery: none  Orthopedic Precautions: none  Pertinent History: Back Surgery, HTN,     Subjective     Rinku comes to therapy reporting 1-2 level pain in the Achilles. Reporting increased back and less right leg symptoms. Could be a sign of some centralization occurring. Goes Monday for nerve conduction test, myelogram and follow-up with Dr. Sparks. He agrees to PT session.    Pain: 1-2/10  Location: Left Achilles Tendon at lateral attachment    Outcome Measure:  Eval: LEFS 54/80 = 32.5% disability, 67.5% functional  9/19/2022: LEFS 66/80 = 17.5% disability, 82.5% functional  11/15/2022: LEFS 57/80=  28% disability, 71.2% functional  01/10/2023: LEFS 69/80= 13.7% disability, 86.3% functional  03/10/2023: LEFS 63/80=  21.2% disability, 78.8% functional  05/15/2023: LEFS  60/80= 25% disability, 75% functional  06/16/2023: LEFS  59/80= 26.25% disability, 73.75% functional    Objective     Rinku received the following treatment:     Time Activities   Manual 40 IASTM, mobilizations and distractions of the calcaneus and subtalar joint as well as forefoot; STM cross frictional and longitudinal lower leg and foot; PROM; Dry needling with e-stim to the left achilles and arch of the foot. This was done by one our trained therapist.   TherAct     TherEx 10 Weighted peroneal ankle eversions, seated calf raises with kettlebell   Gait     Neuro Re-ed     Modalities 10 Ultrasound to the left posterior heel/achilles   E-Stim      Dry Needling     Canalith Repositioning       **Dry needling was done by one our trained therapist.    Home Exercises Provided and Patient Education Provided     Education provided:   - Plan of care, HEP reviewed     Written Home Exercises Provided: yes.  Exercises were reviewed and Rinku was able to demonstrate them prior to the end of the session. Rinku demonstrated good  understanding of the education provided.     Assessment     Rinku is a 56 y.o. male referred to outpatient Physical Therapy with a medical diagnosis of Achilles Tendonitis, Left leg/ankle.     Left Achilles tendon is better and there is less swelling top of the left foot. Ankle ROM continues to be good and basically complete. With ankle eversion and he is demonstrating better peroneal strength which is 4+/5 to 5/5. Calf raises with the kettle bell is no problem for him. Gait is better as well but there is slight limp. Going for  nerve conduction test and myelogram with f/u with Dr. Sparks in July. Feels as though he is able to maintain heel pain but not really making it better or worse given other concerns he is having with other areas.  Will continue working towards return to PLOF of performing all ADL without pain.        Patient prognosis is good     Anticipated barriers to physical therapy: none    Goals: Rinku is progressing well towards his goals.    Short Term Goals: 6 weeks   Patient will report at least 20% disability reduction LEFS to indicate clinically significant functional improvement-GOAL MET  Patient will demonstrate complete ankle ROM to allow for improved left ankle biomechanics-GOAL MET  Patient will demonstrate 5/5 strength to allow return to function without imbalance-PARTIALLY MET  Patient will demonstrate independence with HEP-GOAL MET     Long Term Goals: 12 weeks   Patient will report at least 40% disability reduction LEFS to indicate clinically significant functional improvement-NEARLY MET  Patient will demonstrate  return to PLOF prior to current diagnosis-NOT MET    Plan     Plan of care Certification: 8/23/2022 to 11/23/2022     Outpatient Physical Therapy 2-3 times weekly for 12 weeks to include the following interventions: Gait Training, Manual Therapy, Moist Heat/ Ice, Neuromuscular Re-ed, Patient Education, Self Care, Therapeutic Activities and Therapeutic Exercise.     Chandler Castellon, PT

## 2023-07-14 ENCOUNTER — CLINICAL SUPPORT (OUTPATIENT)
Dept: REHABILITATION | Facility: HOSPITAL | Age: 58
End: 2023-07-14
Payer: COMMERCIAL

## 2023-07-14 DIAGNOSIS — M76.62 TENDONITIS, ACHILLES, LEFT: Primary | ICD-10-CM

## 2023-07-14 DIAGNOSIS — M79.672 PAIN OF LEFT HEEL: ICD-10-CM

## 2023-07-14 PROCEDURE — 97110 THERAPEUTIC EXERCISES: CPT

## 2023-07-14 PROCEDURE — 97140 MANUAL THERAPY 1/> REGIONS: CPT

## 2023-07-14 NOTE — PLAN OF CARE
Physical Therapy Treatment Note     Name: Rinku Herzog  [facility] Number: [id number]    Therapy Diagnosis:   No diagnosis found.      Physician: Virgil Lujan MD    Visit Date: 7/14/2023    Physician Orders: PT Eval and Treat  Medical Diagnosis from Referral: Achilles Tendonitis, Left leg/ankle  Evaluation Date: 8/23/2022  Authorization Period Expiration: 11/23/2022  Plan of Care Expiration: 11/23/2022  Visit # / Visits authorized: 38/88     Time In: 0720  Time Out: 0820  Total Billable Time:  60 minutes    Surgery: none  Orthopedic Precautions: none  Pertinent History: Back Surgery, HTN,     Subjective     Rinku returns to physical therapy today after seeing the neurosurgeon and completing a nerve conduction.  feels surgery not required at this time. Plan is for nerve block/ablation and will be getting with his doctor here Remains with back and gluteal pain but less right leg symptoms. Reports heel pain doing well. Haqs been walking 1-2 miles each morning and there has not been any flare up of the symptoms. He agrees to PT session.    Pain: 1/10  Location: Left Achilles Tendon at lateral attachment    Outcome Measure:  Eval: LEFS 54/80 = 32.5% disability, 67.5% functional  9/19/2022: LEFS 66/80 = 17.5% disability, 82.5% functional  11/15/2022: LEFS 57/80=  28% disability, 71.2% functional  01/10/2023: LEFS 69/80= 13.7% disability, 86.3% functional  03/10/2023: LEFS 63/80=  21.2% disability, 78.8% functional  05/15/2023: LEFS  60/80= 25% disability, 75% functional  06/16/2023: LEFS  59/80= 26.25% disability, 73.75% functional    Objective     Rinku received the following treatment:     Time Activities   Manual 45 IASTM, mobilizations and distractions of the calcaneus and subtalar joint as well as forefoot; STM cross frictional and longitudinal lower leg and foot; PROM; Dry needling with e-stim to the left achilles and arch of the foot. This was done by one our trained therapist.   TherAct     TherEx 15 Weighted  peroneal ankle eversions, seated calf raises with kettlebell   Gait     Neuro Re-ed     Modalities     E-Stim     Dry Needling     Canalith Repositioning       **Dry needling was done by one our trained therapist.    Home Exercises Provided and Patient Education Provided     Education provided:   - Plan of care, HEP reviewed     Written Home Exercises Provided: yes.  Exercises were reviewed and Rinku was able to demonstrate them prior to the end of the session. Rinku demonstrated good  understanding of the education provided.     Assessment     Rinku is a 56 y.o. male referred to outpatient Physical Therapy with a medical diagnosis of Achilles Tendonitis, Left leg/ankle.     Left Achilles tendon doing better despite increased activity. There continues to be some swelling top of the left foot. Ankle ROM continues to be good and basically complete. With ankle eversion and he is demonstrating better peroneal strength which is 5/5 level per MMT. Gait is better and there did not appear to be any real limp. Will continue working towards return to PLOF of performing all ADL without pain.        Patient prognosis is good     Anticipated barriers to physical therapy: none    Goals: Rinku is progressing well towards his goals.    Short Term Goals: 6 weeks   Patient will report at least 20% disability reduction LEFS to indicate clinically significant functional improvement-GOAL MET  Patient will demonstrate complete ankle ROM to allow for improved left ankle biomechanics-GOAL MET  Patient will demonstrate 5/5 strength to allow return to function without imbalance-PARTIALLY MET  Patient will demonstrate independence with HEP-GOAL MET     Long Term Goals: 12 weeks   Patient will report at least 40% disability reduction LEFS to indicate clinically significant functional improvement-NEARLY MET  Patient will demonstrate return to PLOF prior to current diagnosis-NOT MET    Plan     Plan of care Certification: 8/23/2022 to 11/23/2022      Outpatient Physical Therapy 2-3 times weekly for 12 weeks to include the following interventions: Gait Training, Manual Therapy, Moist Heat/ Ice, Neuromuscular Re-ed, Patient Education, Self Care, Therapeutic Activities and Therapeutic Exercise.     Chandler Castellon, PT

## 2023-07-24 ENCOUNTER — PATIENT MESSAGE (OUTPATIENT)
Dept: ADMINISTRATIVE | Facility: HOSPITAL | Age: 58
End: 2023-07-24
Payer: COMMERCIAL

## 2023-08-07 ENCOUNTER — CLINICAL SUPPORT (OUTPATIENT)
Dept: REHABILITATION | Facility: HOSPITAL | Age: 58
End: 2023-08-07
Payer: COMMERCIAL

## 2023-08-07 DIAGNOSIS — M76.62 TENDONITIS, ACHILLES, LEFT: Primary | ICD-10-CM

## 2023-08-07 DIAGNOSIS — M79.672 PAIN OF LEFT HEEL: ICD-10-CM

## 2023-08-07 PROCEDURE — 97110 THERAPEUTIC EXERCISES: CPT

## 2023-08-07 PROCEDURE — 97140 MANUAL THERAPY 1/> REGIONS: CPT

## 2023-08-07 NOTE — PLAN OF CARE
Physical Therapy Treatment Note     Name: Rinku Herzog  [facility] Number: [id number]    Therapy Diagnosis:   No diagnosis found.      Physician: Virgil Lujan MD    Visit Date: 8/7/2023    Physician Orders: PT Eval and Treat  Medical Diagnosis from Referral: Achilles Tendonitis, Left leg/ankle  Evaluation Date: 8/23/2022  Authorization Period Expiration: 11/23/2022  Plan of Care Expiration: 11/23/2022  Visit # / Visits authorized: 39/88     Time In: 0731  Time Out: 0833  Total Billable Time:  62 minutes    Surgery: none  Orthopedic Precautions: none  Pertinent History: Back Surgery, HTN,     Subjective     Rinku reports that he saw the foot doctor and was told to keep doing what he has been doing. Hardly had any pain until he stepped awkwardly off the escalator and felt a sharp pain. Soreness remains at the lateral heel. Remains with back and gluteal pain but less right leg symptoms.Has been exercising and dieting and has lost 25 lbs which has really helped his pains. Walking 2 miles each morning and there has not been any flare up of the symptoms. He agrees to PT session.    Pain: 1/10  Location: Left Achilles Tendon at lateral attachment    Outcome Measure:  Eval: LEFS 54/80 = 32.5% disability, 67.5% functional  9/19/2022: LEFS 66/80 = 17.5% disability, 82.5% functional  11/15/2022: LEFS 57/80=  28% disability, 71.2% functional  01/10/2023: LEFS 69/80= 13.7% disability, 86.3% functional  03/10/2023: LEFS 63/80=  21.2% disability, 78.8% functional  05/15/2023: LEFS  60/80= 25% disability, 75% functional  06/16/2023: LEFS  59/80= 26.25% disability, 73.75% functional    Objective     Rinku received the following treatment:     Time Activities   Manual 46 IASTM, mobilizations and distractions of the calcaneus and subtalar joint as well as forefoot; STM cross frictional and longitudinal lower leg and foot; PROM; Dry needling with e-stim to the left achilles and arch of the foot. This was done by one our trained  therapist.   TherAct     TherEx 16 Weighted peroneal ankle eversions, seated calf raises with kettlebell   Gait     Neuro Re-ed     Modalities     E-Stim     Dry Needling     Canalith Repositioning       **Dry needling was done by one our trained therapist.    Home Exercises Provided and Patient Education Provided     Education provided:   - Plan of care, HEP reviewed     Written Home Exercises Provided: yes.  Exercises were reviewed and Rinku was able to demonstrate them prior to the end of the session. Rinku demonstrated good  understanding of the education provided.     Assessment     Rinku is a 56 y.o. male referred to outpatient Physical Therapy with a medical diagnosis of Achilles Tendonitis, Left leg/ankle.     Overall the left Achilles tendon pain is significantly better than when he started and is remaining under control despite increased activity. There continues to be some swelling top of the left foot but not a concern per foot doctor. Ankle ROM continues to be good and basically complete. With ankle eversion and he is demonstrating better peroneal strength which is 5/5 level per MMT. Gait is better and there did not appear to be any real limp. Will continue working towards return to PLOF of performing all ADL without pain.        Patient prognosis is good     Anticipated barriers to physical therapy: none    Goals: Rinku is progressing well towards his goals.    Short Term Goals: 6 weeks   Patient will report at least 20% disability reduction LEFS to indicate clinically significant functional improvement-GOAL MET  Patient will demonstrate complete ankle ROM to allow for improved left ankle biomechanics-GOAL MET  Patient will demonstrate 5/5 strength to allow return to function without imbalance-PARTIALLY MET  Patient will demonstrate independence with HEP-GOAL MET     Long Term Goals: 12 weeks   Patient will report at least 40% disability reduction LEFS to indicate clinically significant functional  improvement-NEARLY MET  Patient will demonstrate return to PLOF prior to current diagnosis-NOT MET    Plan     Plan of care Certification: 8/23/2022 to 11/23/2022     Outpatient Physical Therapy 2-3 times weekly for 12 weeks to include the following interventions: Gait Training, Manual Therapy, Moist Heat/ Ice, Neuromuscular Re-ed, Patient Education, Self Care, Therapeutic Activities and Therapeutic Exercise.     Chandler Castellon, PT

## 2023-08-11 ENCOUNTER — CLINICAL SUPPORT (OUTPATIENT)
Dept: REHABILITATION | Facility: HOSPITAL | Age: 58
End: 2023-08-11
Payer: COMMERCIAL

## 2023-08-11 DIAGNOSIS — M76.62 TENDONITIS, ACHILLES, LEFT: Primary | ICD-10-CM

## 2023-08-11 DIAGNOSIS — M79.672 PAIN OF LEFT HEEL: ICD-10-CM

## 2023-08-11 PROCEDURE — 97110 THERAPEUTIC EXERCISES: CPT

## 2023-08-11 PROCEDURE — 97140 MANUAL THERAPY 1/> REGIONS: CPT

## 2023-08-11 NOTE — PLAN OF CARE
Physical Therapy Treatment Note     Name: Rinku Herzog  [facility] Number: [id number]    Therapy Diagnosis:   No diagnosis found.      Physician: Virgil Lujan MD    Visit Date: 8/11/2023    Physician Orders: PT Eval and Treat  Medical Diagnosis from Referral: Achilles Tendonitis, Left leg/ankle  Evaluation Date: 8/23/2022  Authorization Period Expiration: 11/23/2022  Plan of Care Expiration: 11/23/2022  Visit # / Visits authorized: 40/88     Time In: 0736  Time Out: 0838  Total Billable Time:  62 minutes    Surgery: none  Orthopedic Precautions: none  Pertinent History: Back Surgery, HTN,     Subjective     Rinku reports that he saw the foot doctor and was told to keep doing what he has been doing. He did give him an insert for his shoe to help take pressure off of the heel area. States that it took a little time to get used to but it is starting to feel better. Soreness remains at the lateral heel but no worsening with his exercise routine of walking 2 miles each day. Has lost 25 lbs which has really helped his pains. Remains with back and gluteal pain but less right leg symptoms, mostly numbness. He agrees to PT session.    Pain: 1/10  Location: Left Achilles Tendon at lateral attachment    Outcome Measure:  Eval: LEFS 54/80 = 32.5% disability, 67.5% functional  9/19/2022: LEFS 66/80 = 17.5% disability, 82.5% functional  11/15/2022: LEFS 57/80=  28% disability, 71.2% functional  01/10/2023: LEFS 69/80= 13.7% disability, 86.3% functional  03/10/2023: LEFS 63/80=  21.2% disability, 78.8% functional  05/15/2023: LEFS  60/80= 25% disability, 75% functional  06/16/2023: LEFS  59/80= 26.25% disability, 73.75% functional    Objective     Rinku received the following treatment:     Time Activities   Manual 46 IASTM, mobilizations and distractions of the calcaneus and subtalar joint as well as forefoot; STM cross frictional and longitudinal lower leg and foot; PROM; Dry needling with e-stim to the left achilles and arch  of the foot. This was done by one our trained therapist.   TherAct     TherEx 16 Weighted peroneal ankle eversions, seated calf raises with kettlebell   Gait     Neuro Re-ed     Modalities     E-Stim     Dry Needling     Canalith Repositioning       **Dry needling was done by one our trained therapist.    Home Exercises Provided and Patient Education Provided     Education provided:   - Plan of care, HEP reviewed     Written Home Exercises Provided: yes.  Exercises were reviewed and Rinku was able to demonstrate them prior to the end of the session. Rinku demonstrated good  understanding of the education provided.     Assessment     Rinku is a 56 y.o. male referred to outpatient Physical Therapy with a medical diagnosis of Achilles Tendonitis, Left leg/ankle.     Overall the left Achilles tendon pain is significantly better than when he started though it has fluctuated in the past but now seems to be remaining under control despite increased activity with his exercise program. There continues to be some swelling top of the left foot but this is less and not a concern per foot doctor. Ankle ROM continues to be good and basically complete. With ankle eversion and he is demonstrating better peroneal strength which is 5/5 level per MMT. Gait is better and there did not appear to be any real limp. Will continue working towards return to PLOF of performing all ADL without pain.        Patient prognosis is good     Anticipated barriers to physical therapy: none    Goals: Rinku is progressing well towards his goals.    Short Term Goals: 6 weeks   Patient will report at least 20% disability reduction LEFS to indicate clinically significant functional improvement-GOAL MET  Patient will demonstrate complete ankle ROM to allow for improved left ankle biomechanics-GOAL MET  Patient will demonstrate 5/5 strength to allow return to function without imbalance-PARTIALLY MET  Patient will demonstrate independence with HEP-GOAL MET      Long Term Goals: 12 weeks   Patient will report at least 40% disability reduction LEFS to indicate clinically significant functional improvement-NEARLY MET  Patient will demonstrate return to PLOF prior to current diagnosis-NOT MET    Plan     Plan of care Certification: 8/23/2022 to 11/23/2022     Outpatient Physical Therapy 2-3 times weekly for 12 weeks to include the following interventions: Gait Training, Manual Therapy, Moist Heat/ Ice, Neuromuscular Re-ed, Patient Education, Self Care, Therapeutic Activities and Therapeutic Exercise.     Chandler Castellon, PT

## 2023-08-29 ENCOUNTER — PATIENT MESSAGE (OUTPATIENT)
Dept: ADMINISTRATIVE | Facility: HOSPITAL | Age: 58
End: 2023-08-29
Payer: COMMERCIAL

## 2023-09-18 ENCOUNTER — LAB VISIT (OUTPATIENT)
Dept: LAB | Facility: HOSPITAL | Age: 58
End: 2023-09-18
Attending: UROLOGY
Payer: COMMERCIAL

## 2023-09-18 DIAGNOSIS — Z00.00 ROUTINE GENERAL MEDICAL EXAMINATION AT A HEALTH CARE FACILITY: ICD-10-CM

## 2023-09-18 DIAGNOSIS — E29.1 3-OXO-5 ALPHA-STEROID DELTA 4-DEHYDROGENASE DEFICIENCY: Primary | ICD-10-CM

## 2023-09-18 DIAGNOSIS — Z12.5 SCREENING FOR MALIGNANT NEOPLASM OF PROSTATE: ICD-10-CM

## 2023-09-18 LAB
ALBUMIN SERPL-MCNC: 4.3 G/DL (ref 3.5–5)
ALBUMIN/GLOB SERPL: 1.5 RATIO (ref 1.1–2)
ALP SERPL-CCNC: 65 UNIT/L (ref 40–150)
ALT SERPL-CCNC: 37 UNIT/L (ref 0–55)
AST SERPL-CCNC: 21 UNIT/L (ref 5–34)
BASOPHILS # BLD AUTO: 0.04 X10(3)/MCL
BASOPHILS NFR BLD AUTO: 0.5 %
BILIRUB SERPL-MCNC: 0.9 MG/DL
BUN SERPL-MCNC: 27 MG/DL (ref 8.4–25.7)
CALCIUM SERPL-MCNC: 9.8 MG/DL (ref 8.4–10.2)
CHLORIDE SERPL-SCNC: 108 MMOL/L (ref 98–107)
CO2 SERPL-SCNC: 23 MMOL/L (ref 22–29)
CREAT SERPL-MCNC: 1.07 MG/DL (ref 0.73–1.18)
EOSINOPHIL # BLD AUTO: 0.1 X10(3)/MCL (ref 0–0.9)
EOSINOPHIL NFR BLD AUTO: 1.3 %
ERYTHROCYTE [DISTWIDTH] IN BLOOD BY AUTOMATED COUNT: 14.3 % (ref 11.5–17)
ESTRADIOL SERPL HS-MCNC: 27 PG/ML
GFR SERPLBLD CREATININE-BSD FMLA CKD-EPI: >60 MLS/MIN/1.73/M2
GLOBULIN SER-MCNC: 2.9 GM/DL (ref 2.4–3.5)
GLUCOSE SERPL-MCNC: 104 MG/DL (ref 74–100)
HCT VFR BLD AUTO: 58 % (ref 42–52)
HGB BLD-MCNC: 19.2 G/DL (ref 14–18)
IMM GRANULOCYTES # BLD AUTO: 0.04 X10(3)/MCL (ref 0–0.04)
IMM GRANULOCYTES NFR BLD AUTO: 0.5 %
LYMPHOCYTES # BLD AUTO: 1.2 X10(3)/MCL (ref 0.6–4.6)
LYMPHOCYTES NFR BLD AUTO: 15.9 %
MCH RBC QN AUTO: 30 PG (ref 27–31)
MCHC RBC AUTO-ENTMCNC: 33.1 G/DL (ref 33–36)
MCV RBC AUTO: 90.5 FL (ref 80–94)
MONOCYTES # BLD AUTO: 0.52 X10(3)/MCL (ref 0.1–1.3)
MONOCYTES NFR BLD AUTO: 6.9 %
NEUTROPHILS # BLD AUTO: 5.63 X10(3)/MCL (ref 2.1–9.2)
NEUTROPHILS NFR BLD AUTO: 74.9 %
NRBC BLD AUTO-RTO: 0 %
PLATELET # BLD AUTO: 183 X10(3)/MCL (ref 130–400)
PMV BLD AUTO: 10.1 FL (ref 7.4–10.4)
POTASSIUM SERPL-SCNC: 4 MMOL/L (ref 3.5–5.1)
PROT SERPL-MCNC: 7.2 GM/DL (ref 6.4–8.3)
PSA SERPL-MCNC: 3.24 NG/ML
RBC # BLD AUTO: 6.41 X10(6)/MCL (ref 4.7–6.1)
SODIUM SERPL-SCNC: 141 MMOL/L (ref 136–145)
TESTOST SERPL-MCNC: 969.57 NG/DL (ref 220.91–715.81)
WBC # SPEC AUTO: 7.53 X10(3)/MCL (ref 4.5–11.5)

## 2023-09-18 PROCEDURE — 82670 ASSAY OF TOTAL ESTRADIOL: CPT

## 2023-09-18 PROCEDURE — 85025 COMPLETE CBC W/AUTO DIFF WBC: CPT

## 2023-09-18 PROCEDURE — 84153 ASSAY OF PSA TOTAL: CPT

## 2023-09-18 PROCEDURE — 80053 COMPREHEN METABOLIC PANEL: CPT

## 2023-09-18 PROCEDURE — 36415 COLL VENOUS BLD VENIPUNCTURE: CPT

## 2023-09-18 PROCEDURE — 84403 ASSAY OF TOTAL TESTOSTERONE: CPT

## 2023-09-22 ENCOUNTER — PATIENT OUTREACH (OUTPATIENT)
Dept: ADMINISTRATIVE | Facility: HOSPITAL | Age: 58
End: 2023-09-22
Payer: COMMERCIAL

## 2023-09-22 NOTE — PROGRESS NOTES
Population Health Chart Review & Patient Outreach Details:     Reason for Outreach Encounter:     []  Non-Compliant Report   []  Payor Report (Hum[employer], PHN, BCBS, MSSP, MCIP, UHC, etc.)   [x]  Pre-Visit Chart Review     Updates Requested / Reviewed:     []  Care Everywhere    []     []  External Sources ([facility], [facility], DIS, etc.)   []  Care Team Updated    Patient Outreach Method:    []  Telephone Outreach Completed   [] Successful   [] Left Voicemail   [] Unable to Contact (wrong number, no voicemail)  []  [facility] Portal Outreach Sent  []  Letter Outreach Mailed  [x]  Fax Sent for External Records  []  External Records Upload    Health Maintenance Topics Addressed and Outreach Outcomes / Actions Taken:        []      Breast Cancer Screening []  Mammo Scheduled      []  External Records Requested     []  Added Reminder to Complete to Upcoming Primary Care Appt Notes     []  Patient Declined     []  Patient Will Call Back to Schedule     []  Patient Will Schedule with External Provider / Order Routed if Applicable             []       Cervical Cancer Screening []  Pap Scheduled      []  External Records Requested     []  Added Reminder to Complete to Upcoming Primary Care Appt Notes     []  Patient Declined     []  Patient Will Call Back to Schedule     []  Patient Will Schedule with External Provider               [x]          Colorectal Cancer Screening []  Colonoscopy Case Request or Referral Placed     [x]  External Records Requested     []  Added Reminder to Complete to Upcoming Primary Care Appt Notes     []  Patient Declined     []  Patient Will Call Back to Schedule     []  Patient Will Schedule with External Provider     []  Fit Kit Mailed (add the SmartPhrase under additional notes)     []  Reminded Patient to Complete Home Test             []      Diabetic Eye Exam []  Eye Camera Scheduled or Optometry Referral Placed     []  External Records Requested     []  Added Reminder to Complete to  Upcoming Primary Care Appt Notes     []  Patient Declined     []  Patient Will Call Back to Schedule     []  Patient Will Schedule with External Provider             []      Blood Pressure Control []  Primary Care Follow Up Visit Scheduled     []  Remote Blood Pressure Reading Captured     []  Added Reminder to Complete to Upcoming Primary Care Appt Notes     []  Patient Declined     []  Patient Will Call Back / Patient Will Send Portal Message with Reading     []  Patient Will Call Back to Schedule Provider Visit             []       HbA1c & Other Labs []  Lab Appt Scheduled for Due Labs     []  Primary Care Follow Up Visit Scheduled      []  Reminded Patient to Complete Home Test     []  Added Reminder to Complete to Upcoming Primary Care Appt Notes     []  Patient Declined     []  Patient Will Call Back to Schedule     []  Patient Will Schedule with External Provider / Order Routed if Applicable           []    Schedule Primary Care Appt []  Primary Care Appt Scheduled     []  Patient Declined     []  Patient Will Call Back to Schedule     []  Pt Established with External Provider & Updated Care Team             []      Medication Adherence []  Primary Care Appointment Scheduled     []  Added Reminder to Upcoming Primary Care Appt Notes     []  Patient Reminded to  Prescription     []  Patient Declined, Provider Notified if Needed     []  Sent Provider Message to Review and/or Add Exclusion to Problem List             []      Osteoporosis Screening []  DXA Appointment Scheduled     []  External Records Requested     []  Added Reminder to Complete to Upcoming Primary Care Appt Notes     []  Patient Declined     []  Patient Will Call Back to Schedule     []  Patient Will Schedule with External Provider / Order Routed if Applicable     Additional Care Coordinator Notes:     Records request sent to Dr Bob's office for colomoscopy.

## 2023-09-22 NOTE — LETTER
AUTHORIZATION FOR RELEASE OF   CONFIDENTIAL INFORMATION    Dear Dr. Bob, Fax [phone]    We are seeing Rinku Herzog, date of birth 1965, in the clinic at [facility]. Jaylon Mishra MD is the patient's PCP. Rinku Herzog has an outstanding lab/procedure at the time we reviewed his chart. In order to help keep his health information updated, he has authorized us to request the following medical record(s):        (  )  MAMMOGRAM                                      ( X )  COLONOSCOPY      (  )  PAP SMEAR                                          (  )  OUTSIDE LAB RESULTS     (  )  DEXA SCAN                                          (  )  EYE EXAM            (  )  FOOT EXAM                                          (  )  ENTIRE RECORD     (  )  OUTSIDE IMMUNIZATIONS                 (  )  _______________         Please fax records to [facility], Bergeaux, Scott J, MD, [phone] or [phone].       If you have any questions you can contact Janet Stuart at [phone].           Patient Name: Rinku Herzog  : 1965  Patient Phone #: [phone]

## 2023-10-02 NOTE — PROGRESS NOTES
I received a fax back from [facility] stating patient rescheduled her colonoscopy to 11/29/23. Reminder set.

## 2023-11-02 ENCOUNTER — LAB VISIT (OUTPATIENT)
Dept: LAB | Facility: HOSPITAL | Age: 58
End: 2023-11-02
Attending: UROLOGY
Payer: COMMERCIAL

## 2023-11-02 DIAGNOSIS — E29.1 3-OXO-5 ALPHA-STEROID DELTA 4-DEHYDROGENASE DEFICIENCY: Primary | ICD-10-CM

## 2023-11-02 LAB
ALBUMIN SERPL-MCNC: 3.9 G/DL (ref 3.5–5)
ALBUMIN/GLOB SERPL: 1.8 RATIO (ref 1.1–2)
ALP SERPL-CCNC: 55 UNIT/L (ref 40–150)
ALT SERPL-CCNC: 47 UNIT/L (ref 0–55)
AST SERPL-CCNC: 26 UNIT/L (ref 5–34)
BASOPHILS # BLD AUTO: 0.08 X10(3)/MCL
BASOPHILS NFR BLD AUTO: 0.8 %
BILIRUB SERPL-MCNC: 0.7 MG/DL
BUN SERPL-MCNC: 30 MG/DL (ref 8.4–25.7)
CALCIUM SERPL-MCNC: 9 MG/DL (ref 8.4–10.2)
CHLORIDE SERPL-SCNC: 108 MMOL/L (ref 98–107)
CO2 SERPL-SCNC: 24 MMOL/L (ref 22–29)
CREAT SERPL-MCNC: 1.36 MG/DL (ref 0.73–1.18)
EOSINOPHIL # BLD AUTO: 0.1 X10(3)/MCL (ref 0–0.9)
EOSINOPHIL NFR BLD AUTO: 1 %
ERYTHROCYTE [DISTWIDTH] IN BLOOD BY AUTOMATED COUNT: 15.5 % (ref 11.5–17)
ESTRADIOL SERPL HS-MCNC: 59 PG/ML
GFR SERPLBLD CREATININE-BSD FMLA CKD-EPI: >60 MLS/MIN/1.73/M2
GLOBULIN SER-MCNC: 2.2 GM/DL (ref 2.4–3.5)
GLUCOSE SERPL-MCNC: 105 MG/DL (ref 74–100)
HCT VFR BLD AUTO: 51.7 % (ref 42–52)
HGB BLD-MCNC: 17.1 G/DL (ref 14–18)
IMM GRANULOCYTES # BLD AUTO: 0.4 X10(3)/MCL (ref 0–0.04)
IMM GRANULOCYTES NFR BLD AUTO: 3.8 %
LYMPHOCYTES # BLD AUTO: 1.05 X10(3)/MCL (ref 0.6–4.6)
LYMPHOCYTES NFR BLD AUTO: 10 %
MCH RBC QN AUTO: 30.6 PG (ref 27–31)
MCHC RBC AUTO-ENTMCNC: 33.1 G/DL (ref 33–36)
MCV RBC AUTO: 92.5 FL (ref 80–94)
MONOCYTES # BLD AUTO: 0.91 X10(3)/MCL (ref 0.1–1.3)
MONOCYTES NFR BLD AUTO: 8.7 %
NEUTROPHILS # BLD AUTO: 7.93 X10(3)/MCL (ref 2.1–9.2)
NEUTROPHILS NFR BLD AUTO: 75.7 %
NRBC BLD AUTO-RTO: 0 %
PLATELET # BLD AUTO: 167 X10(3)/MCL (ref 130–400)
PMV BLD AUTO: 9.8 FL (ref 7.4–10.4)
POTASSIUM SERPL-SCNC: 4.2 MMOL/L (ref 3.5–5.1)
PROT SERPL-MCNC: 6.1 GM/DL (ref 6.4–8.3)
RBC # BLD AUTO: 5.59 X10(6)/MCL (ref 4.7–6.1)
SODIUM SERPL-SCNC: 141 MMOL/L (ref 136–145)
TESTOST SERPL-MCNC: 862.65 NG/DL (ref 220.91–715.81)
WBC # SPEC AUTO: 10.47 X10(3)/MCL (ref 4.5–11.5)

## 2023-11-02 PROCEDURE — 85025 COMPLETE CBC W/AUTO DIFF WBC: CPT

## 2023-11-02 PROCEDURE — 36415 COLL VENOUS BLD VENIPUNCTURE: CPT

## 2023-11-02 PROCEDURE — 82670 ASSAY OF TOTAL ESTRADIOL: CPT

## 2023-11-02 PROCEDURE — 80053 COMPREHEN METABOLIC PANEL: CPT

## 2023-11-02 PROCEDURE — 84403 ASSAY OF TOTAL TESTOSTERONE: CPT

## 2023-11-05 DIAGNOSIS — R05.1 ACUTE COUGH: Primary | ICD-10-CM

## 2023-11-06 ENCOUNTER — HOSPITAL ENCOUNTER (OUTPATIENT)
Dept: RADIOLOGY | Facility: HOSPITAL | Age: 58
Discharge: HOME | End: 2023-11-06
Attending: FAMILY MEDICINE
Payer: COMMERCIAL

## 2023-11-06 DIAGNOSIS — R05.1 ACUTE COUGH: ICD-10-CM

## 2023-11-06 PROCEDURE — 71045 X-RAY EXAM CHEST 1 VIEW: CPT | Mod: TC

## 2023-11-06 RX ORDER — PROMETHAZINE HYDROCHLORIDE AND DEXTROMETHORPHAN HYDROBROMIDE 6.25; 15 MG/5ML; MG/5ML
5 SYRUP ORAL EVERY 4 HOURS PRN
Qty: 200 ML | Refills: 0 | Status: SHIPPED | OUTPATIENT
Start: 2023-11-06 | End: 2023-11-16

## 2023-11-06 RX ORDER — AZITHROMYCIN 250 MG/1
TABLET, FILM COATED ORAL
Qty: 6 TABLET | Refills: 0 | Status: SHIPPED | OUTPATIENT
Start: 2023-11-06 | End: 2023-11-12

## 2023-11-12 DIAGNOSIS — R05.1 ACUTE COUGH: Primary | ICD-10-CM

## 2023-11-12 RX ORDER — DOXYCYCLINE 100 MG/1
100 CAPSULE ORAL EVERY 12 HOURS
Qty: 14 CAPSULE | Refills: 0 | Status: SHIPPED | OUTPATIENT
Start: 2023-11-12 | End: 2023-11-19

## 2023-11-13 ENCOUNTER — LAB VISIT (OUTPATIENT)
Dept: LAB | Facility: HOSPITAL | Age: 58
End: 2023-11-13
Attending: FAMILY MEDICINE
Payer: COMMERCIAL

## 2023-11-13 ENCOUNTER — CLINICAL SUPPORT (OUTPATIENT)
Dept: FAMILY MEDICINE | Facility: CLINIC | Age: 58
End: 2023-11-13
Payer: COMMERCIAL

## 2023-11-13 DIAGNOSIS — R05.1 ACUTE COUGH: Primary | ICD-10-CM

## 2023-11-13 DIAGNOSIS — R05.1 ACUTE COUGH: ICD-10-CM

## 2023-11-13 LAB
B PERT.PT PRMT NPH QL NAA+NON-PROBE: NOT DETECTED
C PNEUM DNA NPH QL NAA+NON-PROBE: NOT DETECTED
HADV DNA NPH QL NAA+NON-PROBE: NOT DETECTED
HCOV 229E RNA NPH QL NAA+NON-PROBE: NOT DETECTED
HCOV HKU1 RNA NPH QL NAA+NON-PROBE: NOT DETECTED
HCOV NL63 RNA NPH QL NAA+NON-PROBE: NOT DETECTED
HCOV OC43 RNA NPH QL NAA+NON-PROBE: NOT DETECTED
HMPV RNA NPH QL NAA+NON-PROBE: NOT DETECTED
HPIV1 RNA NPH QL NAA+NON-PROBE: NOT DETECTED
HPIV2 RNA NPH QL NAA+NON-PROBE: NOT DETECTED
HPIV3 RNA NPH QL NAA+NON-PROBE: NOT DETECTED
HPIV4 RNA NPH QL NAA+NON-PROBE: NOT DETECTED
M PNEUMO DNA NPH QL NAA+NON-PROBE: NOT DETECTED
RSV RNA NPH QL NAA+NON-PROBE: NOT DETECTED
RV+EV RNA NPH QL NAA+NON-PROBE: DETECTED

## 2023-11-13 PROCEDURE — 96372 THER/PROPH/DIAG INJ SC/IM: CPT | Mod: ,,, | Performed by: FAMILY MEDICINE

## 2023-11-13 PROCEDURE — 87633 RESP VIRUS 12-25 TARGETS: CPT

## 2023-11-13 PROCEDURE — 96372 PR INJECTION,THERAP/PROPH/DIAG2ST, IM OR SUBCUT: ICD-10-PCS | Mod: ,,, | Performed by: FAMILY MEDICINE

## 2023-11-13 RX ORDER — CEFTRIAXONE 1 G/1
1 INJECTION, POWDER, FOR SOLUTION INTRAMUSCULAR; INTRAVENOUS
Status: COMPLETED | OUTPATIENT
Start: 2023-11-13 | End: 2023-11-13

## 2023-11-13 RX ADMIN — CEFTRIAXONE 1 G: 1 INJECTION, POWDER, FOR SOLUTION INTRAMUSCULAR; INTRAVENOUS at 11:11

## 2023-11-13 NOTE — PROGRESS NOTES
Ceftriazone 1g given IM Ventrogluteal without complications. Patient tolerated well.   NDC [id number] EXP 8/24

## 2023-11-14 RX ORDER — ALBUTEROL SULFATE 0.83 MG/ML
2.5 SOLUTION RESPIRATORY (INHALATION) EVERY 6 HOURS PRN
Qty: 90 ML | Refills: 1 | Status: SHIPPED | OUTPATIENT
Start: 2023-11-14

## 2023-12-13 RX ORDER — HYDROCODONE POLISTIREX AND CHLORPHENIRAMINE POLISTIREX 10; 8 MG/5ML; MG/5ML
5 SUSPENSION, EXTENDED RELEASE ORAL EVERY 12 HOURS PRN
Qty: 115 ML | Refills: 0 | Status: SHIPPED | OUTPATIENT
Start: 2023-12-13

## 2023-12-13 RX ORDER — BENZONATATE 100 MG/1
100 CAPSULE ORAL 3 TIMES DAILY PRN
Qty: 30 CAPSULE | Refills: 0 | Status: SHIPPED | OUTPATIENT
Start: 2023-12-13 | End: 2023-12-23

## 2023-12-13 RX ORDER — HYDROCODONE POLISTIREX AND CHLORPHENIRAMINE POLISTIREX 10; 8 MG/5ML; MG/5ML
5 SUSPENSION, EXTENDED RELEASE ORAL EVERY 12 HOURS PRN
COMMUNITY
Start: 2023-11-13 | End: 2023-12-13 | Stop reason: SDUPTHER

## 2024-01-05 DIAGNOSIS — N40.0 BENIGN PROSTATIC HYPERPLASIA, UNSPECIFIED WHETHER LOWER URINARY TRACT SYMPTOMS PRESENT: Primary | ICD-10-CM

## 2024-01-05 RX ORDER — TADALAFIL 5 MG/1
5 TABLET ORAL DAILY
Qty: 90 TABLET | Refills: 3 | Status: SHIPPED | OUTPATIENT
Start: 2024-01-05 | End: 2024-02-19 | Stop reason: SDUPTHER

## 2024-02-19 DIAGNOSIS — Z12.11 COLON CANCER SCREENING: Primary | ICD-10-CM

## 2024-02-19 DIAGNOSIS — N40.0 BENIGN PROSTATIC HYPERPLASIA, UNSPECIFIED WHETHER LOWER URINARY TRACT SYMPTOMS PRESENT: ICD-10-CM

## 2024-02-19 RX ORDER — TADALAFIL 5 MG/1
5 TABLET ORAL DAILY
Qty: 90 TABLET | Refills: 3 | Status: SHIPPED | OUTPATIENT
Start: 2024-02-19

## 2024-03-08 RX ORDER — DIPHENOXYLATE HYDROCHLORIDE AND ATROPINE SULFATE 2.5; .025 MG/1; MG/1
1 TABLET ORAL 4 TIMES DAILY PRN
Qty: 20 TABLET | Refills: 0 | Status: SHIPPED | OUTPATIENT
Start: 2024-03-08 | End: 2024-03-18

## 2024-03-20 ENCOUNTER — LAB VISIT (OUTPATIENT)
Dept: LAB | Facility: HOSPITAL | Age: 59
End: 2024-03-20
Attending: UROLOGY
Payer: COMMERCIAL

## 2024-03-20 DIAGNOSIS — E29.1 3-OXO-5 ALPHA-STEROID DELTA 4-DEHYDROGENASE DEFICIENCY: Primary | ICD-10-CM

## 2024-03-20 LAB
ALBUMIN SERPL-MCNC: 3.5 G/DL (ref 3.5–5)
ALBUMIN/GLOB SERPL: 1.5 RATIO (ref 1.1–2)
ALP SERPL-CCNC: 67 UNIT/L (ref 40–150)
ALT SERPL-CCNC: 36 UNIT/L (ref 0–55)
APPEARANCE UR: CLEAR
AST SERPL-CCNC: 22 UNIT/L (ref 5–34)
BASOPHILS # BLD AUTO: 0.06 X10(3)/MCL
BASOPHILS NFR BLD AUTO: 0.9 %
BILIRUB SERPL-MCNC: 0.7 MG/DL
BILIRUB UR QL STRIP.AUTO: NEGATIVE
BUN SERPL-MCNC: 33 MG/DL (ref 8.4–25.7)
CALCIUM SERPL-MCNC: 8.7 MG/DL (ref 8.4–10.2)
CHLORIDE SERPL-SCNC: 112 MMOL/L (ref 98–107)
CO2 SERPL-SCNC: 23 MMOL/L (ref 22–29)
COLOR UR AUTO: YELLOW
CREAT SERPL-MCNC: 1.29 MG/DL (ref 0.73–1.18)
EOSINOPHIL # BLD AUTO: 0.95 X10(3)/MCL (ref 0–0.9)
EOSINOPHIL NFR BLD AUTO: 13.8 %
ERYTHROCYTE [DISTWIDTH] IN BLOOD BY AUTOMATED COUNT: 13.1 % (ref 11.5–17)
ESTRADIOL SERPL HS-MCNC: 24 PG/ML
GFR SERPLBLD CREATININE-BSD FMLA CKD-EPI: >60 MLS/MIN/1.73/M2
GLOBULIN SER-MCNC: 2.4 GM/DL (ref 2.4–3.5)
GLUCOSE SERPL-MCNC: 102 MG/DL (ref 74–100)
GLUCOSE UR QL STRIP.AUTO: NEGATIVE
HCT VFR BLD AUTO: 49.2 % (ref 42–52)
HGB BLD-MCNC: 16.7 G/DL (ref 14–18)
IMM GRANULOCYTES # BLD AUTO: 0.06 X10(3)/MCL (ref 0–0.04)
IMM GRANULOCYTES NFR BLD AUTO: 0.9 %
KETONES UR QL STRIP.AUTO: NEGATIVE
LEUKOCYTE ESTERASE UR QL STRIP.AUTO: NEGATIVE
LYMPHOCYTES # BLD AUTO: 1.28 X10(3)/MCL (ref 0.6–4.6)
LYMPHOCYTES NFR BLD AUTO: 18.6 %
MCH RBC QN AUTO: 30.6 PG (ref 27–31)
MCHC RBC AUTO-ENTMCNC: 33.9 G/DL (ref 33–36)
MCV RBC AUTO: 90.1 FL (ref 80–94)
MONOCYTES # BLD AUTO: 0.56 X10(3)/MCL (ref 0.1–1.3)
MONOCYTES NFR BLD AUTO: 8.1 %
NEUTROPHILS # BLD AUTO: 3.99 X10(3)/MCL (ref 2.1–9.2)
NEUTROPHILS NFR BLD AUTO: 57.7 %
NITRITE UR QL STRIP.AUTO: NEGATIVE
NRBC BLD AUTO-RTO: 0 %
PH UR STRIP.AUTO: 6 [PH]
PLATELET # BLD AUTO: 147 X10(3)/MCL (ref 130–400)
PMV BLD AUTO: 9.9 FL (ref 7.4–10.4)
POTASSIUM SERPL-SCNC: 4.2 MMOL/L (ref 3.5–5.1)
PROT SERPL-MCNC: 5.9 GM/DL (ref 6.4–8.3)
PROT UR QL STRIP.AUTO: NEGATIVE
PSA SERPL-MCNC: 3.92 NG/ML
RBC # BLD AUTO: 5.46 X10(6)/MCL (ref 4.7–6.1)
RBC UR QL AUTO: NEGATIVE
SODIUM SERPL-SCNC: 144 MMOL/L (ref 136–145)
SP GR UR STRIP.AUTO: 1.02 (ref 1–1.03)
TESTOST SERPL-MCNC: 664.37 NG/DL (ref 220.91–715.81)
UROBILINOGEN UR STRIP-ACNC: 0.2
WBC # SPEC AUTO: 6.9 X10(3)/MCL (ref 4.5–11.5)

## 2024-03-20 PROCEDURE — 84153 ASSAY OF PSA TOTAL: CPT

## 2024-03-20 PROCEDURE — 81003 URINALYSIS AUTO W/O SCOPE: CPT

## 2024-03-20 PROCEDURE — 36415 COLL VENOUS BLD VENIPUNCTURE: CPT

## 2024-03-20 PROCEDURE — 80053 COMPREHEN METABOLIC PANEL: CPT

## 2024-03-20 PROCEDURE — 84403 ASSAY OF TOTAL TESTOSTERONE: CPT

## 2024-03-20 PROCEDURE — 82670 ASSAY OF TOTAL ESTRADIOL: CPT

## 2024-03-20 PROCEDURE — 85025 COMPLETE CBC W/AUTO DIFF WBC: CPT

## 2024-03-25 DIAGNOSIS — F41.9 ANXIETY: ICD-10-CM

## 2024-03-25 RX ORDER — BUPROPION HYDROCHLORIDE 150 MG/1
TABLET ORAL
Qty: 90 TABLET | Refills: 3 | Status: SHIPPED | OUTPATIENT
Start: 2024-03-25

## 2024-05-08 ENCOUNTER — ANESTHESIA EVENT (OUTPATIENT)
Dept: SURGERY | Facility: HOSPITAL | Age: 59
End: 2024-05-08
Payer: COMMERCIAL

## 2024-05-08 NOTE — ANESTHESIA PREPROCEDURE EVALUATION
05/08/2024  Rinku Herzog is a 58 y.o., male.      Pre-op Assessment    I have reviewed the Patient Summary Reports.     I have reviewed the Nursing Notes. I have reviewed the NPO Status.   I have reviewed the Medications.     Review of Systems  Anesthesia Hx:  No problems with previous Anesthesia             Denies Family Hx of Anesthesia complications.    Denies Personal Hx of Anesthesia complications.                    Social:  Non-Smoker       Cardiovascular:  Cardiovascular Normal                                            Pulmonary:  Pulmonary Normal                       Renal/:  Renal/ Normal                 Hepatic/GI:  Hepatic/GI Normal                 Musculoskeletal:  Musculoskeletal Normal                Neurological:  Neurology Normal                                      Endocrine:  Endocrine Normal            Psych:  Psychiatric Normal                    Physical Exam  General: Well nourished, Cooperative, Alert and Oriented    Airway:  Mallampati: II / II  Mouth Opening: Normal  TM Distance: Normal  Tongue: Normal  Neck ROM: Normal ROM    Dental:  Intact    Chest/Lungs:  Normal Respiratory Rate    Heart:  Rate: Normal    Musculoskeletal:  Normal mobility      Anesthesia Plan  Type of Anesthesia, risks & benefits discussed:    Anesthesia Type: MAC  Intra-op Monitoring Plan: Standard ASA Monitors  Post Op Pain Control Plan: multimodal analgesia  Induction:  IV  Informed Consent: Informed consent signed with the Patient and all parties understand the risks and agree with anesthesia plan.  All questions answered.   ASA Score: 2  Day of Surgery Review of History & Physical: H&P Update referred to the surgeon/provider.  Anesthesia Plan Notes: Anesthesia plan was discussed with patient and/or representative. Risks and alternatives were discussed including the possibility of alteration of plan.      Ready For Surgery From Anesthesia Perspective.     .

## 2024-05-09 ENCOUNTER — HOSPITAL ENCOUNTER (OUTPATIENT)
Facility: HOSPITAL | Age: 59
Discharge: HOME | End: 2024-05-09
Attending: INTERNAL MEDICINE | Admitting: INTERNAL MEDICINE
Payer: COMMERCIAL

## 2024-05-09 ENCOUNTER — ANESTHESIA (OUTPATIENT)
Dept: SURGERY | Facility: HOSPITAL | Age: 59
End: 2024-05-09
Payer: COMMERCIAL

## 2024-05-09 DIAGNOSIS — Z86.010 PERSONAL HISTORY OF COLONIC POLYPS: Primary | ICD-10-CM

## 2024-05-09 PROCEDURE — 25000003 PHARM REV CODE 250: Performed by: NURSE ANESTHETIST, CERTIFIED REGISTERED

## 2024-05-09 PROCEDURE — 00811 ANES LWR INTST NDSC NOS: CPT | Mod: QZ,P2,33,QS | Performed by: NURSE ANESTHETIST, CERTIFIED REGISTERED

## 2024-05-09 PROCEDURE — 63600175 PHARM REV CODE 636 W HCPCS: Performed by: NURSE ANESTHETIST, CERTIFIED REGISTERED

## 2024-05-09 PROCEDURE — C1769 GUIDE WIRE: HCPCS | Performed by: INTERNAL MEDICINE

## 2024-05-09 PROCEDURE — 37000009 HC ANESTHESIA EA ADD 15 MINS: Performed by: INTERNAL MEDICINE

## 2024-05-09 PROCEDURE — 45385 COLONOSCOPY W/LESION REMOVAL: CPT | Mod: 33 | Performed by: INTERNAL MEDICINE

## 2024-05-09 PROCEDURE — 37000008 HC ANESTHESIA 1ST 15 MINUTES: Performed by: INTERNAL MEDICINE

## 2024-05-09 PROCEDURE — D9220AH HC ANESTHESIA PROFESSIONAL FEE: Mod: QZ,P2,33,QS | Performed by: NURSE ANESTHETIST, CERTIFIED REGISTERED

## 2024-05-09 PROCEDURE — 27201423 OPTIME MED/SURG SUP & DEVICES STERILE SUPPLY: Performed by: INTERNAL MEDICINE

## 2024-05-09 RX ORDER — PROPOFOL 10 MG/ML
VIAL (ML) INTRAVENOUS
Status: DISCONTINUED | OUTPATIENT
Start: 2024-05-09 | End: 2024-05-09

## 2024-05-09 RX ORDER — LIDOCAINE HYDROCHLORIDE 10 MG/ML
INJECTION, SOLUTION EPIDURAL; INFILTRATION; INTRACAUDAL; PERINEURAL
Status: DISCONTINUED | OUTPATIENT
Start: 2024-05-09 | End: 2024-05-09

## 2024-05-09 RX ORDER — ONDANSETRON HYDROCHLORIDE 2 MG/ML
INJECTION, SOLUTION INTRAVENOUS
Status: DISCONTINUED | OUTPATIENT
Start: 2024-05-09 | End: 2024-05-09

## 2024-05-09 RX ORDER — SODIUM CHLORIDE 9 MG/ML
INJECTION, SOLUTION INTRAVENOUS CONTINUOUS
Status: ACTIVE | OUTPATIENT
Start: 2024-05-09

## 2024-05-09 RX ADMIN — PROPOFOL 100 MG: 10 INJECTION, EMULSION INTRAVENOUS at 09:05

## 2024-05-09 RX ADMIN — ONDANSETRON HYDROCHLORIDE 4 MG: 2 SOLUTION INTRAMUSCULAR; INTRAVENOUS at 09:05

## 2024-05-09 RX ADMIN — SODIUM CHLORIDE: 9 INJECTION, SOLUTION INTRAVENOUS at 08:05

## 2024-05-09 RX ADMIN — LIDOCAINE HYDROCHLORIDE 20 MG: 10 INJECTION, SOLUTION EPIDURAL; INFILTRATION; INTRACAUDAL; PERINEURAL at 09:05

## 2024-05-09 NOTE — H&P
Endoscopy History and Physical    PCP - Jaylon Mishra MD    Procedure - Colonoscopy  Sedation: MAC  ASA - per anesthesia  Mallampati - per anesthesia  History of Anesthesia problems - no  Family history Anesthesia problems -  no     HPI:  This is a 58 y.o. male here for evaluation of: personal history of colon polyps     Reflux - no  Dysphagia - no  Abdominal pain - no  Diarrhea - no    ROS:  Constitutional: No fevers, chills, No weight loss  ENT: No allergies  CV: No chest pain  Pulm: No cough, No shortness of breath  Ophtho: No vision changes  GI: see HPI  Medical History:  has a past medical history of Hypertension and Liver cyst.    Surgical History:  has a past surgical history that includes Spine surgery; Tonsillectomy; and Sinus surgery (Bilateral).    Family History: family history includes Cancer in his paternal grandmother; Diabetes in his maternal grandmother; Heart disease in his maternal grandmother.. Otherwise no colon cancer, inflammatory bowel disease, or GI malignancies.    Social History:  reports that he has never smoked. He has never used smokeless tobacco. He reports current alcohol use. He reports that he does not use drugs.    Review of patient's allergies indicates:   Allergen Reactions    Oxycodone-acetaminophen Anxiety       Medications:   Medications Prior to Admission   Medication Sig Dispense Refill Last Dose    famotidine (PEPCID) 40 MG tablet famotidine 40 mg tablet   5/8/2024    lisinopriL 10 MG tablet lisinopril 10 mg tablet   5/9/2024    tadalafiL (CIALIS) 5 MG tablet Take 1 tablet (5 mg total) by mouth once daily. 90 tablet 3 5/8/2024    tamsulosin (FLOMAX) 0.4 mg Cap tamsulosin 0.4 mg capsule  TAKE ONE CAPSULE BY MOUTH DAILY 90 capsule 3 5/8/2024    albuterol (PROVENTIL) 2.5 mg /3 mL (0.083 %) nebulizer solution Take 3 mLs (2.5 mg total) by nebulization every 6 (six) hours as needed for Wheezing or Shortness of Breath. Rescue 90 mL 1     ALPRAZolam (XANAX) 1 MG tablet  alprazolam 1 mg tablet       buPROPion (WELLBUTRIN XL) 150 MG TB24 tablet TAKE 1 TABLET DAILY 90 tablet 3     calcipotriene (DOVONOX) 0.005 % cream        clobetasol 0.05% (TEMOVATE) 0.05 % Oint        doxazosin (CARDURA) 2 MG tablet doxazosin 2 mg tablet       finasteride (PROSCAR) 5 mg tablet finasteride 5 mg tablet       furosemide (LASIX) 20 MG tablet Take 20 mg by mouth daily as needed.       gabapentin (NEURONTIN) 300 MG capsule Take 600 mg by mouth 2 (two) times daily.       HYDROcodone-acetaminophen (NORCO)  mg per tablet Take 1 tablet by mouth every 6 (six) hours as needed.       hydrocodone-chlorpheniramine (TUSSIONEX) 10-8 mg/5 mL suspension Take 5 mLs by mouth every 12 (twelve) hours as needed for Cough or Congestion. 115 mL 0     hydrocortisone 2.5 % cream        ketoconazole (NIZORAL) 2 % cream        meloxicam (MOBIC) 15 MG tablet Take 15 mg by mouth once daily.       nitroGLYCERIN (NITROSTAT) 0.4 MG SL tablet nitroglycerin 0.4 mg sublingual tablet   Unknown    pantoprazole (PROTONIX) 40 MG tablet pantoprazole 40 mg tablet,delayed release       sildenafiL (VIAGRA) 100 MG tablet        zolpidem (AMBIEN) 10 mg Tab zolpidem 10 mg tablet          Objective Findings:    Vital Signs: Per nursing notes.    Physical Exam:  General Appearance: Well appearing in no acute distress  Head:   Normocephalic, without obvious abnormality  Eyes:    No scleral icterus  Airway: Open  Neck: No restriction in mobility  Lungs: CTA bilaterally in anterior and posterior fields, no wheezes, no crackles.  Heart:  Regular rate and rhythm, S1, S2 normal, no murmurs heard  Abdomen: Soft, non tender, non distended      Labs:  Lab Results   Component Value Date    WBC 6.90 03/20/2024    HGB 16.7 03/20/2024    HCT 49.2 03/20/2024     03/20/2024    CHOL 150 06/01/2022    TRIG 178 (H) 06/01/2022    HDL 35 06/01/2022    ALT 36 03/20/2024    AST 22 03/20/2024     03/20/2024    K 4.2 03/20/2024    CREATININE 1.29 (H)  03/20/2024    BUN 33.0 (H) 03/20/2024    CO2 23 03/20/2024    TSH 2.3366 06/01/2022    PSA 3.92 03/20/2024    INR 1.10 02/08/2019    HGBA1C 5.0 05/18/2021         I have explained the risks and benefits of endoscopy procedures to the patient including but not limited to bleeding, perforation, infection, and death.    Thank you so much for allowing me to participate in the care of Rinku Bob MD

## 2024-05-09 NOTE — PROCEDURES
Colonoscopy Procedure Note    Date of procedure: 05/09/2024     Surgeon: Shun Bob    Procedure: Colonoscopy with cold snare polypectomy and hemostatic clip x 2    Indications: personal history of colon polyps        Post op Diagnosis: colon polyps         Sedation: Per anesthesia:       Procedure Details: The patient was brought to the endoscopy suite after the risks, benefits, and alternatives of the procedure were described and the patient was given the opportunity to ask questions and signed informed consent. Patient was positioned in the left lateral decubitus position, continuous monitoring was initiated, and supplemental oxygen was provided via nasal cannula. Adequate sedation was achieved with the medications documented in chart and titrated during the procedure. A digital rectal exam was performed. Under direct visualization the colonoscope was introduced into the rectum and advanced to the cecum. The ileocecal valve and appendiceal orifice were identified. The terminal ileum was not intubated. The scope was withdrawn, and the mucosa was examined in a circular fashion. Polyps removed with cold snare polypectomy and hemostatic clip placed x 2. Retroflexion was done in the rectum. Air was evacuated from the colon and the procedure was completed. The patient tolerated the procedure well and was transferred to the recovery area in stable condition.     Findings:  5 mm transverse colon polyp  1 cm ascending colon polyp, hemostatic clip x 2    Impression and Recommendations:   Repeat colonoscopy in 3 years        Shun Bob MD

## 2024-05-09 NOTE — DISCHARGE SUMMARY
[facility]  Discharge Note  Short Stay    Procedure(s) (LRB):  COLONOSCOPY (N/A)      OUTCOME: Patient tolerated treatment/procedure well without complication and is now ready for discharge.    DISPOSITION: Home or Self Care    FINAL DIAGNOSIS:  colon polyps     FOLLOWUP:  repeat colonoscopy in 3 years     DISCHARGE INSTRUCTIONS:  No discharge procedures on file.     TIME SPENT ON DISCHARGE: 15 minutes

## 2024-05-09 NOTE — ANESTHESIA POSTPROCEDURE EVALUATION
Anesthesia Post Evaluation    Patient: Rinku Herzog    Procedure(s) Performed: Procedure(s) (LRB):  COLONOSCOPY (N/A)    Final Anesthesia Type: MAC      Patient location during evaluation: med/surg floor  Patient participation: Yes- Able to Participate  Level of consciousness: awake and alert  Post-procedure vital signs: reviewed and stable  Pain management: adequate  Airway patency: patent    PONV status at discharge: No PONV  Anesthetic complications: no      Cardiovascular status: blood pressure returned to baseline  Respiratory status: unassisted  Hydration status: euvolemic  Follow-up not needed.              Vitals Value Taken Time   BP  05/09/24 0951   Temp  05/09/24 0951   Pulse  05/09/24 0951   Resp  05/09/24 0951   SpO2  05/09/24 0951         No case tracking events are documented in the log.      Pain/Marva Score: No data recorded

## 2024-05-09 NOTE — DISCHARGE INSTRUCTIONS
Do not drive, operate heavy machinery, or sign important documents for 24 hours.    Drink plenty of fluids and stay out of the sun and heat.    Dr. Bob's office will call you with the results from your procedure.    Repeat colonoscopy in 3-5 years.

## 2024-05-10 VITALS
HEIGHT: 72 IN | OXYGEN SATURATION: 96 % | SYSTOLIC BLOOD PRESSURE: 134 MMHG | HEART RATE: 69 BPM | TEMPERATURE: 98 F | RESPIRATION RATE: 18 BRPM | WEIGHT: 239.81 LBS | BODY MASS INDEX: 32.48 KG/M2 | DIASTOLIC BLOOD PRESSURE: 65 MMHG

## 2024-05-13 LAB — PSYCHE PATHOLOGY RESULT: NORMAL

## 2024-06-13 DIAGNOSIS — R05.1 ACUTE COUGH: Primary | ICD-10-CM

## 2024-06-13 RX ORDER — NIRMATRELVIR AND RITONAVIR 300-100 MG
KIT ORAL
Qty: 30 TABLET | Refills: 0 | Status: SHIPPED | OUTPATIENT
Start: 2024-06-13 | End: 2024-06-18

## 2024-06-13 RX ORDER — NIRMATRELVIR AND RITONAVIR 300-100 MG
KIT ORAL
Qty: 30 TABLET | Refills: 0 | Status: SHIPPED | OUTPATIENT
Start: 2024-06-13 | End: 2024-06-13 | Stop reason: SDUPTHER

## 2024-07-18 ENCOUNTER — LAB VISIT (OUTPATIENT)
Dept: LAB | Facility: HOSPITAL | Age: 59
End: 2024-07-18
Attending: UROLOGY
Payer: COMMERCIAL

## 2024-07-18 DIAGNOSIS — E29.1 TESTICULAR HYPOFUNCTION: Primary | ICD-10-CM

## 2024-07-18 LAB
ALBUMIN SERPL-MCNC: 4.3 G/DL (ref 3.5–5)
ALBUMIN/GLOB SERPL: 1.7 RATIO (ref 1.1–2)
ALP SERPL-CCNC: 71 UNIT/L (ref 40–150)
ALT SERPL-CCNC: 29 UNIT/L (ref 0–55)
ANION GAP SERPL CALC-SCNC: 10 MEQ/L
AST SERPL-CCNC: 23 UNIT/L (ref 5–34)
BASOPHILS # BLD AUTO: 0.03 X10(3)/MCL
BASOPHILS NFR BLD AUTO: 0.6 %
BILIRUB SERPL-MCNC: 1 MG/DL
BUN SERPL-MCNC: 23 MG/DL (ref 8.4–25.7)
CALCIUM SERPL-MCNC: 9.4 MG/DL (ref 8.4–10.2)
CHLORIDE SERPL-SCNC: 109 MMOL/L (ref 98–107)
CO2 SERPL-SCNC: 24 MMOL/L (ref 22–29)
CREAT SERPL-MCNC: 1.13 MG/DL (ref 0.73–1.18)
CREAT/UREA NIT SERPL: 20
EOSINOPHIL # BLD AUTO: 0.05 X10(3)/MCL (ref 0–0.9)
EOSINOPHIL NFR BLD AUTO: 1 %
ERYTHROCYTE [DISTWIDTH] IN BLOOD BY AUTOMATED COUNT: 12.7 % (ref 11.5–17)
ESTRADIOL SERPL HS-MCNC: <24 PG/ML
GFR SERPLBLD CREATININE-BSD FMLA CKD-EPI: >60 ML/MIN/1.73/M2
GLOBULIN SER-MCNC: 2.6 GM/DL (ref 2.4–3.5)
GLUCOSE SERPL-MCNC: 112 MG/DL (ref 74–100)
HCT VFR BLD AUTO: 48 % (ref 42–52)
HGB BLD-MCNC: 16.2 G/DL (ref 14–18)
IMM GRANULOCYTES # BLD AUTO: 0.06 X10(3)/MCL (ref 0–0.04)
IMM GRANULOCYTES NFR BLD AUTO: 1.2 %
LYMPHOCYTES # BLD AUTO: 1.42 X10(3)/MCL (ref 0.6–4.6)
LYMPHOCYTES NFR BLD AUTO: 27.3 %
MCH RBC QN AUTO: 30.3 PG (ref 27–31)
MCHC RBC AUTO-ENTMCNC: 33.8 G/DL (ref 33–36)
MCV RBC AUTO: 89.7 FL (ref 80–94)
MONOCYTES # BLD AUTO: 0.39 X10(3)/MCL (ref 0.1–1.3)
MONOCYTES NFR BLD AUTO: 7.5 %
NEUTROPHILS # BLD AUTO: 3.26 X10(3)/MCL (ref 2.1–9.2)
NEUTROPHILS NFR BLD AUTO: 62.4 %
NRBC BLD AUTO-RTO: 0 %
PLATELET # BLD AUTO: 143 X10(3)/MCL (ref 130–400)
PMV BLD AUTO: 9.3 FL (ref 7.4–10.4)
POTASSIUM SERPL-SCNC: 3.9 MMOL/L (ref 3.5–5.1)
PROT SERPL-MCNC: 6.9 GM/DL (ref 6.4–8.3)
PSA SERPL-MCNC: 4.35 NG/ML
RBC # BLD AUTO: 5.35 X10(6)/MCL (ref 4.7–6.1)
SODIUM SERPL-SCNC: 143 MMOL/L (ref 136–145)
TESTOST SERPL-MCNC: 378.35 NG/DL (ref 220.91–715.81)
WBC # BLD AUTO: 5.21 X10(3)/MCL (ref 4.5–11.5)

## 2024-07-18 PROCEDURE — 80053 COMPREHEN METABOLIC PANEL: CPT

## 2024-07-18 PROCEDURE — 84153 ASSAY OF PSA TOTAL: CPT

## 2024-07-18 PROCEDURE — 82670 ASSAY OF TOTAL ESTRADIOL: CPT

## 2024-07-18 PROCEDURE — 84403 ASSAY OF TOTAL TESTOSTERONE: CPT

## 2024-07-18 PROCEDURE — 36415 COLL VENOUS BLD VENIPUNCTURE: CPT

## 2024-07-18 PROCEDURE — 85025 COMPLETE CBC W/AUTO DIFF WBC: CPT

## 2024-08-21 ENCOUNTER — OFFICE VISIT (OUTPATIENT)
Dept: FAMILY MEDICINE | Facility: CLINIC | Age: 59
End: 2024-08-21
Payer: COMMERCIAL

## 2024-08-21 VITALS
OXYGEN SATURATION: 98 % | RESPIRATION RATE: 18 BRPM | TEMPERATURE: 97 F | HEIGHT: 72 IN | SYSTOLIC BLOOD PRESSURE: 116 MMHG | WEIGHT: 254 LBS | DIASTOLIC BLOOD PRESSURE: 70 MMHG | BODY MASS INDEX: 34.4 KG/M2 | HEART RATE: 80 BPM

## 2024-08-21 DIAGNOSIS — D69.6 THROMBOCYTOPENIA, UNSPECIFIED: ICD-10-CM

## 2024-08-21 DIAGNOSIS — T14.8XXA MUSCLE STRAIN: Primary | ICD-10-CM

## 2024-08-21 PROCEDURE — 3008F BODY MASS INDEX DOCD: CPT | Mod: CPTII,,, | Performed by: FAMILY MEDICINE

## 2024-08-21 PROCEDURE — 1159F MED LIST DOCD IN RCRD: CPT | Mod: CPTII,,, | Performed by: FAMILY MEDICINE

## 2024-08-21 PROCEDURE — 3078F DIAST BP <80 MM HG: CPT | Mod: CPTII,,, | Performed by: FAMILY MEDICINE

## 2024-08-21 PROCEDURE — 3074F SYST BP LT 130 MM HG: CPT | Mod: CPTII,,, | Performed by: FAMILY MEDICINE

## 2024-08-21 PROCEDURE — 1160F RVW MEDS BY RX/DR IN RCRD: CPT | Mod: CPTII,,, | Performed by: FAMILY MEDICINE

## 2024-08-21 PROCEDURE — 99214 OFFICE O/P EST MOD 30 MIN: CPT | Mod: ,,, | Performed by: FAMILY MEDICINE

## 2024-08-21 PROCEDURE — 4010F ACE/ARB THERAPY RXD/TAKEN: CPT | Mod: CPTII,,, | Performed by: FAMILY MEDICINE

## 2024-08-21 RX ORDER — NAPROXEN 500 MG/1
500 TABLET ORAL 2 TIMES DAILY WITH MEALS
Qty: 30 TABLET | Refills: 1 | Status: SHIPPED | OUTPATIENT
Start: 2024-08-21 | End: 2024-09-20

## 2024-08-21 RX ORDER — CYCLOBENZAPRINE HCL 5 MG
5 TABLET ORAL 3 TIMES DAILY PRN
Qty: 30 TABLET | Refills: 0 | Status: SHIPPED | OUTPATIENT
Start: 2024-08-21 | End: 2024-08-31

## 2024-08-21 NOTE — PROGRESS NOTES
"Subjective:      Patient ID: Rinku Herzog is a 58 y.o. male.    Chief Complaint: Shoulder Pain (right)      Shoulder pain    Shoulder Pain   Pertinent negatives include no headaches.       Review of Systems   Constitutional: Negative.    Respiratory: Negative.     Cardiovascular: Negative.    Musculoskeletal:  Positive for neck pain.        Right shoulder pain: present x 4 days, pain starts in neck, no recent trauma, awakened with "crick" in neck   Neurological:  Negative for dizziness and headaches.         Objective:     /70   Pulse 80   Temp 97.2 °F (36.2 °C) (Temporal)   Resp 18   Ht 5' 11.65" (1.82 m)   Wt 115.2 kg (254 lb)   SpO2 98%   BMI 34.78 kg/m²    Physical Exam  Constitutional:       Appearance: Normal appearance.   Cardiovascular:      Rate and Rhythm: Normal rate and regular rhythm.      Heart sounds: Normal heart sounds.   Pulmonary:      Effort: Pulmonary effort is normal.      Breath sounds: Normal breath sounds.   Musculoskeletal:      Comments: TTP on right-sided neck, TTP on right posterior deltoid   Neurological:      Mental Status: He is alert.   Psychiatric:         Mood and Affect: Mood normal.         Behavior: Behavior normal.         Thought Content: Thought content normal.         Judgment: Judgment normal.             Assessment:     Problem List Items Addressed This Visit          Hematology    Thrombocytopenia, unspecified    Current Assessment & Plan     Trending lab work          Other Visit Diagnoses       Muscle strain    -  Primary    Relevant Medications    naproxen (NAPROSYN) 500 MG tablet    cyclobenzaprine (FLEXERIL) 5 MG tablet             Plan:   1. Muscle strain  -     naproxen (NAPROSYN) 500 MG tablet; Take 1 tablet (500 mg total) by mouth 2 (two) times daily with meals.  Dispense: 30 tablet; Refill: 1  -     cyclobenzaprine (FLEXERIL) 5 MG tablet; Take 1 tablet (5 mg total) by mouth 3 (three) times daily as needed for Muscle spasms.  Dispense: 30 tablet; " Refill: 0  Rx for naproxen 500 mg b.i.d. x2 weeks   Rx for Flexeril 5 mg t.i.d. times 10 days   Heat p.r.n.   Range of motion as tolerated   Monitor  Return to clinic with any concerns    2. Thrombocytopenia, unspecified  Assessment & Plan:  Trending lab work

## 2024-11-04 RX ORDER — GABAPENTIN 300 MG/1
300 CAPSULE ORAL 3 TIMES DAILY
Qty: 90 CAPSULE | Refills: 3 | Status: SHIPPED | OUTPATIENT
Start: 2024-11-04 | End: 2025-11-04

## 2024-11-04 RX ORDER — DICLOFENAC SODIUM 75 MG/1
75 TABLET, DELAYED RELEASE ORAL 2 TIMES DAILY PRN
Qty: 60 TABLET | Refills: 2 | Status: SHIPPED | OUTPATIENT
Start: 2024-11-04 | End: 2025-03-04

## 2024-12-13 ENCOUNTER — OFFICE VISIT (OUTPATIENT)
Dept: FAMILY MEDICINE | Facility: CLINIC | Age: 59
End: 2024-12-13
Payer: COMMERCIAL

## 2024-12-13 VITALS
DIASTOLIC BLOOD PRESSURE: 82 MMHG | HEIGHT: 72 IN | WEIGHT: 263 LBS | HEART RATE: 74 BPM | RESPIRATION RATE: 18 BRPM | OXYGEN SATURATION: 98 % | TEMPERATURE: 97 F | SYSTOLIC BLOOD PRESSURE: 136 MMHG | BODY MASS INDEX: 35.62 KG/M2

## 2024-12-13 DIAGNOSIS — Z01.818 PRE-OP EXAM: ICD-10-CM

## 2024-12-13 DIAGNOSIS — M50.90 CERVICAL DISC DISEASE: Primary | ICD-10-CM

## 2024-12-13 NOTE — PROGRESS NOTES
"Subjective:      Patient ID: Rinku Herzog is a 59 y.o. male.    Chief Complaint: Pre-op Exam (Surgery Clearance for Dr Lira 1/7/25 -Cervical Fusion. Received cardiac clearance from Dr Landis yesterday)      Preop clearance        Review of Systems   Constitutional: Negative.    Respiratory: Negative.     Cardiovascular: Negative.         Cardiac clearance with Dr. Landis   Musculoskeletal:  Positive for neck pain.        Recent MRI shows cervical disc disease with cervical canal stenosis, patient seen by Dr. Lira, scheduled for cervical fusion on 01/07/2025         Objective:     /82   Pulse 74   Temp 97 °F (36.1 °C) (Temporal)   Resp 18   Ht 5' 11.65" (1.82 m)   Wt 119.3 kg (263 lb)   SpO2 98%   BMI 36.01 kg/m²    Physical Exam  Constitutional:       Appearance: Normal appearance.   Cardiovascular:      Rate and Rhythm: Normal rate and regular rhythm.      Heart sounds: Normal heart sounds.   Pulmonary:      Effort: Pulmonary effort is normal.      Breath sounds: Normal breath sounds.   Neurological:      Mental Status: He is alert.   Psychiatric:         Mood and Affect: Mood normal.         Behavior: Behavior normal.         Thought Content: Thought content normal.         Judgment: Judgment normal.             Assessment:     Problem List Items Addressed This Visit    None  Visit Diagnoses       Cervical disc disease    -  Primary    Pre-op exam                 Plan:   1. Cervical disc disease  Cardiac clearance with Dr. Landis   Patient cleared medically   Keep scheduled appointment with Dr. Lira    2. Pre-op exam  Keep scheduled appointment with Dr Lira       "

## 2024-12-17 DIAGNOSIS — M50.90 CERVICAL DISC DISEASE: Primary | ICD-10-CM

## 2024-12-17 DIAGNOSIS — Z01.818 PREOP TESTING: ICD-10-CM

## 2024-12-26 ENCOUNTER — HOSPITAL ENCOUNTER (OUTPATIENT)
Dept: RADIOLOGY | Facility: HOSPITAL | Age: 59
Discharge: HOME | End: 2024-12-26
Attending: FAMILY MEDICINE
Payer: COMMERCIAL

## 2024-12-26 DIAGNOSIS — M50.90 CERVICAL DISC DISEASE: ICD-10-CM

## 2024-12-26 DIAGNOSIS — Z01.818 PREOP TESTING: ICD-10-CM

## 2024-12-26 PROCEDURE — 71046 X-RAY EXAM CHEST 2 VIEWS: CPT | Mod: TC

## 2025-01-18 ENCOUNTER — HOSPITAL ENCOUNTER (EMERGENCY)
Facility: HOSPITAL | Age: 60
Discharge: HOME | End: 2025-01-18
Attending: FAMILY MEDICINE
Payer: COMMERCIAL

## 2025-01-18 VITALS
TEMPERATURE: 98 F | SYSTOLIC BLOOD PRESSURE: 138 MMHG | OXYGEN SATURATION: 97 % | HEART RATE: 88 BPM | DIASTOLIC BLOOD PRESSURE: 76 MMHG | WEIGHT: 252 LBS | HEIGHT: 72 IN | RESPIRATION RATE: 18 BRPM | BODY MASS INDEX: 34.13 KG/M2

## 2025-01-18 DIAGNOSIS — R33.9 URINARY RETENTION: Primary | ICD-10-CM

## 2025-01-18 LAB
BACTERIA #/AREA URNS AUTO: ABNORMAL /HPF
BILIRUB UR QL STRIP.AUTO: NEGATIVE
CLARITY UR: CLEAR
COLOR UR AUTO: YELLOW
GLUCOSE UR QL STRIP: NEGATIVE
HGB UR QL STRIP: ABNORMAL
KETONES UR QL STRIP: NEGATIVE
LEUKOCYTE ESTERASE UR QL STRIP: NEGATIVE
NITRITE UR QL STRIP: NEGATIVE
PH UR STRIP: 6 [PH]
PROT UR QL STRIP: NEGATIVE
RBC #/AREA URNS AUTO: ABNORMAL /HPF
SP GR UR STRIP.AUTO: 1.01 (ref 1–1.03)
SQUAMOUS #/AREA URNS AUTO: ABNORMAL /HPF
UROBILINOGEN UR STRIP-ACNC: 0.2
WBC #/AREA URNS AUTO: ABNORMAL /HPF

## 2025-01-18 PROCEDURE — 63600175 PHARM REV CODE 636 W HCPCS: Performed by: FAMILY MEDICINE

## 2025-01-18 PROCEDURE — 51702 INSERT TEMP BLADDER CATH: CPT

## 2025-01-18 PROCEDURE — 96372 THER/PROPH/DIAG INJ SC/IM: CPT | Performed by: FAMILY MEDICINE

## 2025-01-18 PROCEDURE — 81003 URINALYSIS AUTO W/O SCOPE: CPT | Performed by: FAMILY MEDICINE

## 2025-01-18 PROCEDURE — 51798 US URINE CAPACITY MEASURE: CPT

## 2025-01-18 PROCEDURE — 99284 EMERGENCY DEPT VISIT MOD MDM: CPT | Mod: 25

## 2025-01-18 RX ORDER — LIDOCAINE HYDROCHLORIDE 20 MG/ML
10 JELLY TOPICAL
Status: DISCONTINUED | OUTPATIENT
Start: 2025-01-18 | End: 2025-01-18 | Stop reason: HOSPADM

## 2025-01-18 RX ORDER — NITROFURANTOIN 25; 75 MG/1; MG/1
100 CAPSULE ORAL 2 TIMES DAILY
COMMUNITY
Start: 2025-01-16

## 2025-01-18 RX ADMIN — LORAZEPAM 2 MG: 2 INJECTION INTRAMUSCULAR; INTRAVENOUS at 02:01

## 2025-01-18 NOTE — ED PROVIDER NOTES
Encounter Date: 1/18/2025       History     Chief Complaint   Patient presents with    Urinary Retention     Had catheter in following neck surgery, it was removed on Thurs. Patient came in today with urinary retention.      Pt with urinary difficulty. Had a catheter placed post spinal surgery, removed last week. Gradually less and less urine out.        Review of patient's allergies indicates:   Allergen Reactions    Oxycodone-acetaminophen Anxiety     Past Medical History:   Diagnosis Date    Hypertension     Liver cyst      Past Surgical History:   Procedure Laterality Date    COLONOSCOPY N/A 5/9/2024    Procedure: COLONOSCOPY;  Surgeon: Shun Bob MD;  Location: [facility];  Service: Gastroenterology;  Laterality: N/A;    SINUS SURGERY Bilateral     SPINE SURGERY      TONSILLECTOMY       Family History   Problem Relation Name Age of Onset    Heart disease Maternal Grandmother Drothy Gisela     Diabetes Maternal Grandmother Drothy [place]     Hearing loss Maternal Grandmother Drothy Gisela     Cancer Paternal Grandmother Eithe Bou[place]ion     Heart disease Paternal Grandmother Eithe Boullion     Arthritis Father Blue     Vision loss Father Blue     Cancer Maternal Grandfather Madi Higgins      Social History     Tobacco Use    Smoking status: Never    Smokeless tobacco: Never    Tobacco comments:     N/A   Substance Use Topics    Alcohol use: Yes     Alcohol/week: 2.0 standard drinks of alcohol     Types: 2 Glasses of wine per week    Drug use: Never     Review of Systems   Constitutional:  Negative for fever.   HENT:  Negative for sore throat.    Respiratory:  Negative for shortness of breath.    Cardiovascular:  Negative for chest pain.   Gastrointestinal:  Negative for nausea.   Genitourinary:  Positive for decreased urine volume and urgency. Negative for dysuria.   Musculoskeletal:  Negative for back pain.   Skin:  Negative for rash.   Neurological:  Negative for weakness.   Hematological:  Does not  bruise/bleed easily.   All other systems reviewed and are negative.      Physical Exam     Initial Vitals [01/18/25 1437]   BP Pulse Resp Temp SpO2   (!) 158/84 92 20 97.7 °F (36.5 °C) 95 %      MAP       --         Physical Exam    Nursing note and vitals reviewed.  Constitutional: Vital signs are normal. He appears well-developed and well-nourished. He is cooperative.  Non-toxic appearance. He does not appear ill.   HENT:   Head: Normocephalic and atraumatic.   Eyes: Conjunctivae and lids are normal.   Neck: Trachea normal.   Pt in c-collar   Cardiovascular:  Normal rate and regular rhythm.  No extrasystoles are present.          Pulmonary/Chest: Breath sounds normal.   Abdominal: Abdomen is soft. He exhibits distension. There is no abdominal tenderness.   Musculoskeletal:         General: Normal range of motion.     Neurological: He is alert and oriented to person, place, and time. He has normal strength. No cranial nerve deficit or sensory deficit. He displays a negative Romberg sign.   Skin: Skin is warm, dry and intact. Capillary refill takes less than 2 seconds.   Psychiatric: He has a normal mood and affect. His speech is normal and behavior is normal. He is not actively hallucinating. He is attentive.         ED Course   Procedures  Labs Reviewed   URINALYSIS, REFLEX TO URINE CULTURE          Imaging Results    None          Medications   LIDOcaine HCL 2% jelly 10 mL (has no administration in time range)   LORazepam (ATIVAN) injection 2 mg (2 mg Intramuscular Given 1/18/25 1451)     Medical Decision Making  Bladder scan 900cc.    Pt reports scheduled for a TURP in April...    Coudet placed 1000cc urine out, much relief. Leg bag placed.    Risk  Prescription drug management.                                      Clinical Impression:  Final diagnoses:  [R33.9] Urinary retention (Primary)          ED Disposition Condition    Discharge Stable          ED Prescriptions    None       Follow-up Information        Follow up With Specialties Details Why Contact Info    Jaylon Mishra MD Family Medicine Schedule an appointment as soon as possible for a visit   [address]  [facility] [address]  [phone]               Gen Aldrich MD  01/18/25 4031

## 2025-01-20 ENCOUNTER — HOSPITAL ENCOUNTER (EMERGENCY)
Facility: HOSPITAL | Age: 60
Discharge: HOME | End: 2025-01-20
Attending: STUDENT IN AN ORGANIZED HEALTH CARE EDUCATION/TRAINING PROGRAM
Payer: COMMERCIAL

## 2025-01-20 VITALS
TEMPERATURE: 98 F | HEART RATE: 79 BPM | DIASTOLIC BLOOD PRESSURE: 94 MMHG | SYSTOLIC BLOOD PRESSURE: 134 MMHG | BODY MASS INDEX: 34.13 KG/M2 | OXYGEN SATURATION: 96 % | RESPIRATION RATE: 20 BRPM | HEIGHT: 72 IN | WEIGHT: 252 LBS

## 2025-01-20 VITALS
HEIGHT: 72 IN | RESPIRATION RATE: 18 BRPM | BODY MASS INDEX: 28.99 KG/M2 | WEIGHT: 214 LBS | TEMPERATURE: 98 F | OXYGEN SATURATION: 95 % | HEART RATE: 89 BPM | DIASTOLIC BLOOD PRESSURE: 92 MMHG | SYSTOLIC BLOOD PRESSURE: 171 MMHG

## 2025-01-20 DIAGNOSIS — T83.9XXA PROBLEM WITH FOLEY CATHETER, INITIAL ENCOUNTER: Primary | ICD-10-CM

## 2025-01-20 LAB
BACTERIA #/AREA URNS AUTO: NORMAL /HPF
BILIRUB UR QL STRIP.AUTO: NEGATIVE
CLARITY UR: CLEAR
COLOR UR AUTO: YELLOW
GLUCOSE UR QL STRIP: NEGATIVE
HGB UR QL STRIP: ABNORMAL
KETONES UR QL STRIP: NEGATIVE
LEUKOCYTE ESTERASE UR QL STRIP: NEGATIVE
NITRITE UR QL STRIP: NEGATIVE
PH UR STRIP: 6 [PH]
PROT UR QL STRIP: NEGATIVE
RBC #/AREA URNS AUTO: NORMAL /HPF
SP GR UR STRIP.AUTO: 1.01 (ref 1–1.03)
SQUAMOUS #/AREA URNS AUTO: NORMAL /HPF
UROBILINOGEN UR STRIP-ACNC: 0.2
WBC #/AREA URNS AUTO: NORMAL /HPF

## 2025-01-20 PROCEDURE — 81003 URINALYSIS AUTO W/O SCOPE: CPT | Performed by: STUDENT IN AN ORGANIZED HEALTH CARE EDUCATION/TRAINING PROGRAM

## 2025-01-20 PROCEDURE — 51798 US URINE CAPACITY MEASURE: CPT

## 2025-01-20 PROCEDURE — 99282 EMERGENCY DEPT VISIT SF MDM: CPT

## 2025-01-20 PROCEDURE — 99282 EMERGENCY DEPT VISIT SF MDM: CPT | Mod: 25,27

## 2025-01-20 PROCEDURE — 99283 EMERGENCY DEPT VISIT LOW MDM: CPT | Mod: 25

## 2025-01-20 NOTE — ED PROVIDER NOTES
Encounter Date: 1/20/2025       History     Chief Complaint   Patient presents with    Urinary Retention     Pt reports urinary retention this am, sat on toilet to void reports clot of sediment followed by 900 ml of urine to bag.  Would like to make sure that catheter is clear of blockages.  Ortiz catheter in place for urinary retention after spinal surgery 2 weeks ago.  Under care of urologist.       Patient is a 59-year-old white male past medical history of hypertension and BPH who presented to the ER today due to concerns about Ortiz catheter issue.  Patient states he had cervical spine surgery about 3 weeks ago and has been suffering with urinary retention.  Patient has had a Ortiz catheter on 2 separate occasions that is currently has 1 now.  He states he was feeling distended this morning but urine eventually passed.  His PCP recommended he come in to be evaluated.  Patient denies any fever, chills, cough, congestion, chest pain, shortness of breath.      Review of patient's allergies indicates:   Allergen Reactions    Oxycodone-acetaminophen Anxiety     Past Medical History:   Diagnosis Date    Hypertension     Liver cyst      Past Surgical History:   Procedure Laterality Date    COLONOSCOPY N/A 5/9/2024    Procedure: COLONOSCOPY;  Surgeon: Shun Bob MD;  Location: [facility];  Service: Gastroenterology;  Laterality: N/A;    SINUS SURGERY Bilateral     SPINE SURGERY      TONSILLECTOMY       Family History   Problem Relation Name Age of Onset    Heart disease Maternal Grandmother Drothy [place]     Diabetes Maternal Grandmother Drothy [place]     Hearing loss Maternal Grandmother Drothy Gisela     Cancer Paternal Grandmother Eithe Boullion     Heart disease Paternal Grandmother Eithe [place]ll[place]     Arthritis Father Blue     Vision loss Father Blue     Cancer Maternal Grandfather Madi Higgins      Social History     Tobacco Use    Smoking status: Never    Smokeless tobacco: Never    Tobacco  comments:     N/A   Substance Use Topics    Alcohol use: Yes     Alcohol/week: 2.0 standard drinks of alcohol     Types: 2 Glasses of wine per week    Drug use: Never     Review of Systems   Constitutional:  Negative for chills, fatigue and fever.   HENT:  Negative for congestion, sore throat and trouble swallowing.    Eyes:  Negative for pain and visual disturbance.   Respiratory:  Negative for cough, shortness of breath and wheezing.    Cardiovascular:  Negative for chest pain and palpitations.   Gastrointestinal:  Negative for abdominal pain, blood in stool, constipation, diarrhea, nausea and vomiting.   Genitourinary:  Negative for dysuria and hematuria.   Musculoskeletal:  Negative for back pain and myalgias.   Skin:  Negative for rash and wound.   Neurological:  Negative for seizures, syncope and headaches.   Psychiatric/Behavioral:  Negative for confusion. The patient is not nervous/anxious.        Physical Exam     Initial Vitals [01/20/25 0850]   BP Pulse Resp Temp SpO2   (!) 134/94 79 20 97.9 °F (36.6 °C) 96 %      MAP       --         Physical Exam    Nursing note and vitals reviewed.  Constitutional: He appears well-developed and well-nourished. No distress.   HENT:   Head: Normocephalic and atraumatic.   Eyes: Conjunctivae and EOM are normal. Right eye exhibits no discharge. Left eye exhibits no discharge. No scleral icterus.   Neck: No tracheal deviation present.   Normal range of motion.  Cardiovascular:  Normal rate, regular rhythm and normal heart sounds.     Exam reveals no gallop and no friction rub.       No murmur heard.  Pulmonary/Chest: Breath sounds normal. No respiratory distress. He has no wheezes. He has no rhonchi. He has no rales.   Abdominal: Abdomen is soft. He exhibits no distension. There is no abdominal tenderness.   Negative for CVA tenderness bilaterally There is no rebound and no guarding.   Musculoskeletal:         General: No edema. Normal range of motion.      Cervical back:  Normal range of motion.     Neurological: He is alert.   Skin: Skin is warm and dry. No rash and no abscess noted. No erythema. No pallor.   Psychiatric: His behavior is normal. Judgment normal.         ED Course   Procedures  Labs Reviewed   URINALYSIS, REFLEX TO URINE CULTURE - Abnormal       Result Value    Color, UA Yellow      Appearance, UA Clear      Specific Gravity, UA 1.010      pH, UA 6.0      Protein, UA Negative      Glucose, UA Negative      Ketones, UA Negative      Blood, UA 2+ (*)     Bilirubin, UA Negative      Urobilinogen, UA 0.2      Nitrites, UA Negative      Leukocyte Esterase, UA Negative     URINALYSIS, MICROSCOPIC - Normal    Bacteria, UA Rare      RBC, UA 3-5      WBC, UA 0-2      Squamous Epithelial Cells, UA Rare            Imaging Results    None          Medications - No data to display  Medical Decision Making  Differentials:  Ortiz catheter problem, hematuria, urinary retention   History in his the patient   59-year-old well-appearing male presents to the ER today due to concerns about a Ortiz catheter.  Patient states that he was able to put out 900 cc of urine in the last 1 hour.  He states that has initial concern is relatively dispelled arrival.  Urine is clear in the Ortiz bag.  Urine sent off showing some red blood cells with no signs of UTI.  Bladder scan 83 cc in offered patient irrigation but after discussion with him he states he [place] 2 blocks from the hospital and would return if he is not putting out urine.  Advised him strong ER return precautions.  Close follow up with his urologist was recommended.  All questions were answered in layman's terms.    Amount and/or Complexity of Data Reviewed  Labs: ordered. Decision-making details documented in ED Course.                                      Clinical Impression:  Final diagnoses:  [T83.9XXA] Problem with Ortiz catheter, initial encounter (Primary)          ED Disposition Condition    Discharge Stable          ED  Prescriptions    None       Follow-up Information       Follow up With Specialties Details Why Contact Info    [facility] - Emergency Dept Emergency Medicine  If symptoms worsen [address] [facility] [id number] [phone]    Jaylon Mishra MD Family Medicine Schedule an appointment as soon as possible for a visit   [address] [facility] [address]  [phone]               Shun Spencer MD  01/20/25 2670

## 2025-01-20 NOTE — ED NOTES
Pt ambulated to ed rm 4 from [facility]. Aaox4. Pt seen here earlier today for urinary retention that resolved on own. Pt has jama cath in and states since 1315 he has not been able to urinate drained 200ml from the bag and feels lower abd pressure. In no distress. Wctm

## 2025-01-20 NOTE — ED PROVIDER NOTES
Encounter Date: 1/20/2025       History     Chief Complaint   Patient presents with    Urinary Retention     RTC for urinary retention.  States emptied 200 ml at 1314, 1500 ml at 1114     Patient 59-year-old white gentleman history of hypertension and BPH who presented to the ER today for Ortiz catheter issue.  He was seen here just 2 hours ago and had a clean urine was able to put out urine via Ortiz.  He states he went home and but out a proximally 200 cc over 2 hours.  He states this seems to be adequate who presented to the ER today due to concerns about his Ortiz catheter not working he states he is also wanting irrigation of his Ortiz which he denied earlier today.  Denies any other complaints.      Review of patient's allergies indicates:   Allergen Reactions    Oxycodone-acetaminophen Anxiety     Past Medical History:   Diagnosis Date    Hypertension     Liver cyst      Past Surgical History:   Procedure Laterality Date    COLONOSCOPY N/A 5/9/2024    Procedure: COLONOSCOPY;  Surgeon: Shun Bob MD;  Location: [facility];  Service: Gastroenterology;  Laterality: N/A;    SINUS SURGERY Bilateral     SPINE SURGERY      TONSILLECTOMY       Family History   Problem Relation Name Age of Onset    Heart disease Maternal Grandmother Drothy [place]     Diabetes Maternal Grandmother Drothy [place]     Hearing loss Maternal Grandmother Drothy Gisela     Cancer Paternal Grandmother Eithe Boullion     Heart disease Paternal Grandmother Eithe Boullion     Arthritis Father Blue     Vision loss Father Blue     Cancer Maternal Grandfather Madi Higgins      Social History     Tobacco Use    Smoking status: Never    Smokeless tobacco: Never    Tobacco comments:     N/A   Substance Use Topics    Alcohol use: Yes     Alcohol/week: 2.0 standard drinks of alcohol     Types: 2 Glasses of wine per week    Drug use: Never     Review of Systems   Constitutional:  Negative for chills, fatigue and fever.   HENT:  Negative for  congestion, sore throat and trouble swallowing.    Eyes:  Negative for pain and visual disturbance.   Respiratory:  Negative for cough, shortness of breath and wheezing.    Cardiovascular:  Negative for chest pain and palpitations.   Gastrointestinal:  Negative for abdominal pain, blood in stool, constipation, diarrhea, nausea and vomiting.   Genitourinary:  Positive for difficulty urinating. Negative for dysuria and hematuria.   Musculoskeletal:  Negative for back pain and myalgias.   Skin:  Negative for rash and wound.   Neurological:  Negative for seizures, syncope and headaches.   Psychiatric/Behavioral:  Negative for confusion. The patient is not nervous/anxious.        Physical Exam     Initial Vitals [01/20/25 1345]   BP Pulse Resp Temp SpO2   (!) 171/92 89 18 97.7 °F (36.5 °C) 95 %      MAP       --         Physical Exam    Nursing note and vitals reviewed.  Constitutional: He appears well-developed and well-nourished. No distress.   HENT:   Head: Normocephalic and atraumatic.   Eyes: Conjunctivae and EOM are normal. Right eye exhibits no discharge. Left eye exhibits no discharge. No scleral icterus.   Neck: No tracheal deviation present.   Normal range of motion.  Cardiovascular:  Normal rate, regular rhythm and normal heart sounds.     Exam reveals no gallop and no friction rub.       No murmur heard.  Pulmonary/Chest: Breath sounds normal. No respiratory distress. He has no wheezes. He has no rhonchi. He has no rales.   Abdominal: Abdomen is soft. He exhibits no distension.   Musculoskeletal:         General: No edema. Normal range of motion.      Cervical back: Normal range of motion.     Neurological: He is alert.   Skin: Skin is warm and dry. No rash and no abscess noted. No erythema. No pallor.   Psychiatric: His behavior is normal. Judgment normal.         ED Course   Procedures  Labs Reviewed - No data to display       Imaging Results    None          Medications - No data to display  Medical  Decision Making  Differentials: Ortiz catheter issue, UTI   History in his the patient   59-year-old well-appearing male presents to the ER today due to concerns about not enough output via Ortiz.  Ortiz catheter irrigated and no clots expelled. Bladder scan  after irrigation of 50cc  All questions answered in layman terms, follow up with urologist recd and ER return precautions discussed.    Amount and/or Complexity of Data Reviewed  External Data Reviewed: labs.     Details: Urine studies from earlier day showed no signs UTI.                                      Clinical Impression:  Final diagnoses:  [T83.9XXA] Problem with Ortiz catheter, initial encounter (Primary)          ED Disposition Condition    Discharge Stable          ED Prescriptions    None       Follow-up Information       Follow up With Specialties Details Why Contact Info    [facility] - Emergency Dept Emergency Medicine  If symptoms worsen [address] [facility] [address] [phone]    Jaylon Mishra MD Family Medicine Schedule an appointment as soon as possible for a visit   [address] [facility] [address]  [phone]               Shun Spencer MD  01/20/25 0297

## 2025-01-24 ENCOUNTER — HOSPITAL ENCOUNTER (EMERGENCY)
Facility: HOSPITAL | Age: 60
Discharge: HOME | End: 2025-01-24
Attending: FAMILY MEDICINE
Payer: COMMERCIAL

## 2025-01-24 VITALS
RESPIRATION RATE: 20 BRPM | BODY MASS INDEX: 34.13 KG/M2 | HEART RATE: 91 BPM | OXYGEN SATURATION: 95 % | DIASTOLIC BLOOD PRESSURE: 95 MMHG | WEIGHT: 252 LBS | TEMPERATURE: 99 F | SYSTOLIC BLOOD PRESSURE: 154 MMHG | HEIGHT: 72 IN

## 2025-01-24 DIAGNOSIS — R33.9 URINARY RETENTION: Primary | ICD-10-CM

## 2025-01-24 DIAGNOSIS — T83.9XXA FOLEY CATHETER PROBLEM, INITIAL ENCOUNTER: ICD-10-CM

## 2025-01-24 PROCEDURE — 99284 EMERGENCY DEPT VISIT MOD MDM: CPT

## 2025-01-24 RX ORDER — PHENAZOPYRIDINE HYDROCHLORIDE 200 MG/1
200 TABLET, FILM COATED ORAL 3 TIMES DAILY PRN
Qty: 15 TABLET | Refills: 0 | Status: SHIPPED | OUTPATIENT
Start: 2025-01-24 | End: 2025-01-29

## 2025-01-24 RX ORDER — LIDOCAINE AND PRILOCAINE 25; 25 MG/G; MG/G
CREAM TOPICAL
Qty: 5 G | Refills: 0 | Status: SHIPPED | OUTPATIENT
Start: 2025-01-24 | End: 2025-02-03

## 2025-01-24 NOTE — ED PROVIDER NOTES
Encounter Date: 1/24/2025       History     Chief Complaint   Patient presents with    Urinary Retention     Has catheter issues after neck surgery and thinks his catheter is clogged.     This patient is a 59-year-old male who comes in with problems with his urinary catheter.  He feels it may not be draining well and he also complains of penile tip pain    The history is provided by the patient.     Review of patient's allergies indicates:   Allergen Reactions    Oxycodone-acetaminophen Anxiety     Past Medical History:   Diagnosis Date    Hypertension     Liver cyst      Past Surgical History:   Procedure Laterality Date    COLONOSCOPY N/A 5/9/2024    Procedure: COLONOSCOPY;  Surgeon: Shun Bob MD;  Location: [facility];  Service: Gastroenterology;  Laterality: N/A;    SINUS SURGERY Bilateral     SPINE SURGERY      TONSILLECTOMY       Family History   Problem Relation Name Age of Onset    Heart disease Maternal Grandmother Drothy [place]     Diabetes Maternal Grandmother Drothy [place]     Hearing loss Maternal Grandmother Drothy Gisela     Cancer Paternal Grandmother Eithe Boullion     Heart disease Paternal Grandmother Eithe Boullion     Arthritis Father Blue     Vision loss Father Blue     Cancer Maternal Grandfather Madi Higgins      Social History     Tobacco Use    Smoking status: Never    Smokeless tobacco: Never    Tobacco comments:     N/A   Substance Use Topics    Alcohol use: Yes     Alcohol/week: 2.0 standard drinks of alcohol     Types: 2 Glasses of wine per week    Drug use: Never     Review of Systems   Constitutional: Negative.    HENT: Negative.     Respiratory: Negative.     Cardiovascular: Negative.    Endocrine: Negative.    Genitourinary:  Positive for difficulty urinating and penile pain.   Musculoskeletal: Negative.    Skin: Negative.    Neurological: Negative.    Psychiatric/Behavioral: Negative.     All other systems reviewed and are negative.      Physical Exam     Initial  Vitals [01/24/25 1742]   BP Pulse Resp Temp SpO2   (!) 154/95 91 20 99 °F (37.2 °C) 95 %      MAP       --         Physical Exam    Nursing note and vitals reviewed.  Constitutional: He appears well-developed and well-nourished.   HENT:   Head: Normocephalic.   Eyes: Pupils are equal, round, and reactive to light.   Neck:   Normal range of motion.  Cardiovascular:  Normal rate and regular rhythm.           Pulmonary/Chest: Breath sounds normal.   Abdominal: Abdomen is soft. Bowel sounds are normal.   Genitourinary:    Genitourinary Comments: Patient states that he is having some spasms of the bladder and they make him feel that he still needs to urinate.  However, the results of the bladder scanner showed that he had 24 mL in his bladder.  Nurse flushed the Ortiz and it flushed well.  Patient complains that there is some irritation at the penile introitus     Musculoskeletal:         General: Normal range of motion.      Cervical back: Normal range of motion.     Neurological: He is alert and oriented to person, place, and time. He has normal strength. GCS score is 15. GCS eye subscore is 4. GCS verbal subscore is 5. GCS motor subscore is 6.   Skin: Skin is warm and dry. Capillary refill takes less than 2 seconds.   Psychiatric: He has a normal mood and affect.         ED Course   Procedures  Labs Reviewed - No data to display       Imaging Results    None          Medications - No data to display  Medical Decision Making  This patient is a 59-year-old male who has a urinary catheter that has been irritating him.  Patient had surgery in the beginning of the month and was unable to urinate after and came to the ER and had a catheter placed.  Since then he has been back to the ER a few times for problems associated with the catheter.  Today he feels that the catheter maybe is not draining and he complains of penile irritation.  Differential diagnosis:  Bladder spasms, irritation secondary to the catheter                                       Clinical Impression:  Final diagnoses:  [R33.9] Urinary retention (Primary)  [T83.9XXA] Ortiz catheter problem, initial encounter          ED Disposition Condition    Discharge Stable          ED Prescriptions       Medication Sig Dispense Start Date End Date Auth. Provider    LIDOcaine-prilocaine (EMLA) cream Apply topically as needed (penile pain). 5 g 1/24/2025 2/3/2025 Luisana Smith MD    phenazopyridine (PYRIDIUM) 200 MG tablet Take 1 tablet (200 mg total) by mouth 3 (three) times daily as needed for Pain. 15 tablet 1/24/2025 1/29/2025 Luisana Smith MD          Follow-up Information       Follow up With Specialties Details Why Contact Info    Jaylon Mishra MD Family Medicine Schedule an appointment as soon as possible for a visit in 3 days  [address]  [place] [address]  [phone]               Luisana Smith MD  01/24/25 0647

## 2025-01-25 NOTE — ED NOTES
Bladder scanner done at the bedside.  24ml in the bladder at this time.  Urine in tubing and jama bag light yellow and clear.  Jama cath flushed easily and without any resistance.  Pt states he has been having bladder spasms and was concerned that his catheter was back up.

## 2025-02-10 ENCOUNTER — HOSPITAL ENCOUNTER (EMERGENCY)
Facility: HOSPITAL | Age: 60
Discharge: HOME | End: 2025-02-10
Attending: GENERAL PRACTICE
Payer: COMMERCIAL

## 2025-02-10 VITALS
RESPIRATION RATE: 18 BRPM | TEMPERATURE: 98 F | HEIGHT: 72 IN | WEIGHT: 252 LBS | SYSTOLIC BLOOD PRESSURE: 164 MMHG | HEART RATE: 91 BPM | OXYGEN SATURATION: 96 % | BODY MASS INDEX: 34.13 KG/M2 | DIASTOLIC BLOOD PRESSURE: 81 MMHG

## 2025-02-10 DIAGNOSIS — R33.9 URINARY RETENTION: Primary | ICD-10-CM

## 2025-02-10 DIAGNOSIS — N34.2 INFECTIVE URETHRITIS: ICD-10-CM

## 2025-02-10 LAB
BACTERIA #/AREA URNS AUTO: ABNORMAL /HPF
BILIRUB UR QL STRIP.AUTO: NEGATIVE
CLARITY UR: CLEAR
COLOR UR AUTO: YELLOW
GLUCOSE UR QL STRIP: NEGATIVE
HGB UR QL STRIP: ABNORMAL
KETONES UR QL STRIP: NEGATIVE
LEUKOCYTE ESTERASE UR QL STRIP: ABNORMAL
NITRITE UR QL STRIP: NEGATIVE
PH UR STRIP: 6 [PH]
PROT UR QL STRIP: NEGATIVE
RBC #/AREA URNS AUTO: ABNORMAL /HPF
SP GR UR STRIP.AUTO: 1.02 (ref 1–1.03)
SQUAMOUS #/AREA URNS AUTO: ABNORMAL /HPF
UROBILINOGEN UR STRIP-ACNC: 0.2
WBC #/AREA URNS AUTO: ABNORMAL /HPF

## 2025-02-10 PROCEDURE — 99283 EMERGENCY DEPT VISIT LOW MDM: CPT

## 2025-02-10 PROCEDURE — 25000003 PHARM REV CODE 250: Performed by: GENERAL PRACTICE

## 2025-02-10 PROCEDURE — 87077 CULTURE AEROBIC IDENTIFY: CPT | Performed by: GENERAL PRACTICE

## 2025-02-10 PROCEDURE — 81001 URINALYSIS AUTO W/SCOPE: CPT | Performed by: GENERAL PRACTICE

## 2025-02-10 RX ORDER — LIDOCAINE AND PRILOCAINE 25; 25 MG/G; MG/G
CREAM TOPICAL
COMMUNITY

## 2025-02-10 RX ORDER — HYDROXYZINE PAMOATE 50 MG/1
CAPSULE ORAL
COMMUNITY

## 2025-02-10 RX ORDER — DIAZEPAM 2 MG/1
TABLET ORAL
COMMUNITY

## 2025-02-10 RX ORDER — SULFAMETHOXAZOLE AND TRIMETHOPRIM 800; 160 MG/1; MG/1
1 TABLET ORAL
Status: COMPLETED | OUTPATIENT
Start: 2025-02-10 | End: 2025-02-10

## 2025-02-10 RX ORDER — HYDROCODONE BITARTRATE AND ACETAMINOPHEN 10; 325 MG/1; MG/1
TABLET ORAL
COMMUNITY

## 2025-02-10 RX ORDER — METHOCARBAMOL 750 MG/1
TABLET, FILM COATED ORAL
COMMUNITY

## 2025-02-10 RX ORDER — SULFAMETHOXAZOLE AND TRIMETHOPRIM 800; 160 MG/1; MG/1
1 TABLET ORAL 2 TIMES DAILY
Qty: 14 TABLET | Refills: 0 | Status: SHIPPED | OUTPATIENT
Start: 2025-02-10 | End: 2025-02-12 | Stop reason: ALTCHOICE

## 2025-02-10 RX ORDER — PHENAZOPYRIDINE HYDROCHLORIDE 200 MG/1
1 TABLET, FILM COATED ORAL 3 TIMES DAILY PRN
COMMUNITY

## 2025-02-10 RX ADMIN — SULFAMETHOXAZOLE AND TRIMETHOPRIM 1 TABLET: 800; 160 TABLET ORAL at 01:02

## 2025-02-10 NOTE — ED PROVIDER NOTES
"Encounter Date: 2/10/2025       History     Chief Complaint   Patient presents with    Urinary Retention     C/o some urinary retention and burning with urination after jama removed 1 week ago. Pt states the burning pain is a 5/10 on pain scale. Last took Diclofenac 3 hrs pta. Pt states he has also been having chills starting today. Denies any other symptoms. AAOx4. Temp on arrival 98.4 F.       C/o some urinary retention and burning with urination after jama removed 1 week ago. Pt states the burning pain is a 5/10 on pain scale. Last took Diclofenac 3 hrs pta. Pt states he has also been having chills starting today. Denies any other symptoms. AAOx4. Temp on arrival 98.4 F. he has a cystoscopy scheduled tomorrow with his urologist.    The history is provided by the patient.   Dysuria   This is a new problem. The current episode started yesterday. The problem has been waxing and waning. The quality of the pain is described as burning. He is Sexually active. Associated symptoms include chills and sweats. He has tried nothing for the symptoms.     Review of patient's allergies indicates:   Allergen Reactions    Oxycodone-acetaminophen Anxiety     Other Reaction(s): "out of body experience", Not available     Past Medical History:   Diagnosis Date    BPH (benign prostatic hyperplasia)     Hypertension     Liver cyst      Past Surgical History:   Procedure Laterality Date    COLONOSCOPY N/A 5/9/2024    Procedure: COLONOSCOPY;  Surgeon: Shun Bob MD;  Location: [facility];  Service: Gastroenterology;  Laterality: N/A;    SINUS SURGERY Bilateral     SPINE SURGERY      TONSILLECTOMY       Family History   Problem Relation Name Age of Onset    Heart disease Maternal Grandmother Drothy Gisela     Diabetes Maternal Grandmother Drothy [place]     Hearing loss Maternal Grandmother Drothy [place]     Cancer Paternal Grandmother Eithe Boullion     Heart disease Paternal Grandmother Eithe Boullion     Arthritis Father " Blue     Vision loss Father Blue     Cancer Maternal Grandfather Madi Higgins      Social History     Tobacco Use    Smoking status: Never    Smokeless tobacco: Never    Tobacco comments:     N/A   Substance Use Topics    Alcohol use: Not Currently     Alcohol/week: 2.0 standard drinks of alcohol     Types: 2 Glasses of wine per week    Drug use: Never     Review of Systems   Constitutional:  Positive for chills.   HENT: Negative.     Eyes: Negative.    Respiratory: Negative.     Cardiovascular: Negative.    Gastrointestinal: Negative.    Endocrine: Negative.    Genitourinary:  Positive for dysuria.   Musculoskeletal: Negative.    Skin: Negative.    Allergic/Immunologic: Negative.    Neurological: Negative.    Hematological: Negative.    Psychiatric/Behavioral: Negative.     All other systems reviewed and are negative.      Physical Exam     Initial Vitals [02/10/25 0026]   BP Pulse Resp Temp SpO2   (!) 164/81 91 18 98.4 °F (36.9 °C) 96 %      MAP       --         Physical Exam    Nursing note and vitals reviewed.  Constitutional: He appears well-developed and well-nourished.   HENT:   Head: Normocephalic and atraumatic.   Eyes: EOM are normal. Pupils are equal, round, and reactive to light.   Neck: Neck supple.   Normal range of motion.  Cardiovascular:  Normal rate, regular rhythm, normal heart sounds and intact distal pulses.           Pulmonary/Chest: Breath sounds normal.   Abdominal: Abdomen is soft. Bowel sounds are normal.   Musculoskeletal:         General: Normal range of motion.      Cervical back: Normal range of motion and neck supple.     Neurological: He is alert and oriented to person, place, and time. He has normal strength. GCS score is 15. GCS eye subscore is 4. GCS verbal subscore is 5. GCS motor subscore is 6.   Skin: Skin is warm and dry.   Psychiatric: He has a normal mood and affect. His behavior is normal. Judgment and thought content normal.         ED Course   Procedures  Labs Reviewed    URINALYSIS, REFLEX TO URINE CULTURE - Abnormal       Result Value    Color, UA Yellow      Appearance, UA Clear      Specific Gravity, UA 1.020      pH, UA 6.0      Protein, UA Negative      Glucose, UA Negative      Ketones, UA Negative      Blood, UA Trace (*)     Bilirubin, UA Negative      Urobilinogen, UA 0.2      Nitrites, UA Negative      Leukocyte Esterase, UA 2+ (*)    URINALYSIS, MICROSCOPIC - Abnormal    Bacteria, UA Rare      RBC, UA 0-5      WBC, UA 21-50 (*)     Squamous Epithelial Cells, UA Rare     CULTURE, URINE          Imaging Results    None          Medications   sulfamethoxazole-trimethoprim 800-160mg per tablet 1 tablet (has no administration in time range)     Medical Decision Making  Bladder scan revealed approximately 250 cc in the bladder.  The patient was able to void 25 cc here.  Urinalysis also reveals the presence of a urinary tract infection.  We will treat with antibiotics.  The patient is scheduled for cystoscopy tomorrow with his urologist.    Amount and/or Complexity of Data Reviewed  Labs: ordered.    Risk  Prescription drug management.                                      Clinical Impression:  Final diagnoses:  [R33.9] Urinary retention (Primary)  [N34.2] Infective urethritis          ED Disposition Condition    Discharge Stable          ED Prescriptions       Medication Sig Dispense Start Date End Date Auth. Provider    sulfamethoxazole-trimethoprim 800-160mg (BACTRIM DS) 800-160 mg Tab Take 1 tablet by mouth 2 (two) times daily. for 7 days 14 tablet 2/10/2025 2/17/2025 Tyler Nobles MD          Follow-up Information       Follow up With Specialties Details Why Contact Info    Jaylon Mishra MD Family Medicine Call in 2 days As needed [address]  [facility] [address]  [phone]               Tyler Nobles MD  02/10/25 0121

## 2025-02-12 ENCOUNTER — TELEPHONE (OUTPATIENT)
Dept: EMERGENCY MEDICINE | Facility: HOSPITAL | Age: 60
End: 2025-02-12
Payer: COMMERCIAL

## 2025-02-12 ENCOUNTER — LAB VISIT (OUTPATIENT)
Dept: LAB | Facility: HOSPITAL | Age: 60
End: 2025-02-12
Attending: UROLOGY
Payer: COMMERCIAL

## 2025-02-12 DIAGNOSIS — E29.1 3-OXO-5 ALPHA-STEROID DELTA 4-DEHYDROGENASE DEFICIENCY: ICD-10-CM

## 2025-02-12 DIAGNOSIS — R97.20 ELEVATED PROSTATE SPECIFIC ANTIGEN (PSA): Primary | ICD-10-CM

## 2025-02-12 LAB
ALBUMIN SERPL-MCNC: 4 G/DL (ref 3.5–5)
ALBUMIN/GLOB SERPL: 1.1 RATIO (ref 1.1–2)
ALP SERPL-CCNC: 86 UNIT/L (ref 40–150)
ALT SERPL-CCNC: 17 UNIT/L (ref 0–55)
ANION GAP SERPL CALC-SCNC: 10 MEQ/L
AST SERPL-CCNC: 12 UNIT/L (ref 5–34)
BACTERIA UR CULT: ABNORMAL
BASOPHILS # BLD AUTO: 0.05 X10(3)/MCL
BASOPHILS NFR BLD AUTO: 0.5 %
BILIRUB SERPL-MCNC: 0.6 MG/DL
BUN SERPL-MCNC: 14 MG/DL (ref 8.4–25.7)
CALCIUM SERPL-MCNC: 9.6 MG/DL (ref 8.4–10.2)
CHLORIDE SERPL-SCNC: 109 MMOL/L (ref 98–107)
CO2 SERPL-SCNC: 23 MMOL/L (ref 22–29)
CREAT SERPL-MCNC: 1.1 MG/DL (ref 0.72–1.25)
CREAT/UREA NIT SERPL: 13
EOSINOPHIL # BLD AUTO: 0.09 X10(3)/MCL (ref 0–0.9)
EOSINOPHIL NFR BLD AUTO: 0.9 %
ERYTHROCYTE [DISTWIDTH] IN BLOOD BY AUTOMATED COUNT: 13.2 % (ref 11.5–17)
GFR SERPLBLD CREATININE-BSD FMLA CKD-EPI: >60 ML/MIN/1.73/M2
GLOBULIN SER-MCNC: 3.7 GM/DL (ref 2.4–3.5)
GLUCOSE SERPL-MCNC: 101 MG/DL (ref 74–100)
HCT VFR BLD AUTO: 43.4 % (ref 42–52)
HGB BLD-MCNC: 14.5 G/DL (ref 14–18)
IMM GRANULOCYTES # BLD AUTO: 0.08 X10(3)/MCL (ref 0–0.04)
IMM GRANULOCYTES NFR BLD AUTO: 0.8 %
LYMPHOCYTES # BLD AUTO: 1.46 X10(3)/MCL (ref 0.6–4.6)
LYMPHOCYTES NFR BLD AUTO: 14.1 %
MCH RBC QN AUTO: 30.6 PG (ref 27–31)
MCHC RBC AUTO-ENTMCNC: 33.4 G/DL (ref 33–36)
MCV RBC AUTO: 91.6 FL (ref 80–94)
MONOCYTES # BLD AUTO: 0.68 X10(3)/MCL (ref 0.1–1.3)
MONOCYTES NFR BLD AUTO: 6.6 %
NEUTROPHILS # BLD AUTO: 8.02 X10(3)/MCL (ref 2.1–9.2)
NEUTROPHILS NFR BLD AUTO: 77.1 %
NRBC BLD AUTO-RTO: 0 %
PLATELET # BLD AUTO: 168 X10(3)/MCL (ref 130–400)
PMV BLD AUTO: 9.8 FL (ref 7.4–10.4)
POTASSIUM SERPL-SCNC: 3.8 MMOL/L (ref 3.5–5.1)
PROT SERPL-MCNC: 7.7 GM/DL (ref 6.4–8.3)
PSA SERPL-MCNC: 19.59 NG/ML
RBC # BLD AUTO: 4.74 X10(6)/MCL (ref 4.7–6.1)
SODIUM SERPL-SCNC: 142 MMOL/L (ref 136–145)
WBC # BLD AUTO: 10.38 X10(3)/MCL (ref 4.5–11.5)

## 2025-02-12 PROCEDURE — 84153 ASSAY OF PSA TOTAL: CPT

## 2025-02-12 PROCEDURE — 84403 ASSAY OF TOTAL TESTOSTERONE: CPT

## 2025-02-12 PROCEDURE — 82670 ASSAY OF TOTAL ESTRADIOL: CPT

## 2025-02-12 PROCEDURE — 36415 COLL VENOUS BLD VENIPUNCTURE: CPT

## 2025-02-12 PROCEDURE — 85025 COMPLETE CBC W/AUTO DIFF WBC: CPT

## 2025-02-12 PROCEDURE — 80053 COMPREHEN METABOLIC PANEL: CPT

## 2025-02-12 RX ORDER — NITROFURANTOIN 25; 75 MG/1; MG/1
100 CAPSULE ORAL 2 TIMES DAILY
Qty: 14 CAPSULE | Refills: 0 | Status: SHIPPED | OUTPATIENT
Start: 2025-02-12 | End: 2025-02-19

## 2025-02-12 NOTE — TELEPHONE ENCOUNTER
Urine culture returned showing sensitive to fluoroquinolones and Macrobid.  No previous EKG on file to assess QTC thus Macrobid sent to pharmacy.  Patient notified by RN.

## 2025-02-13 DIAGNOSIS — N40.0 BENIGN PROSTATIC HYPERPLASIA, UNSPECIFIED WHETHER LOWER URINARY TRACT SYMPTOMS PRESENT: ICD-10-CM

## 2025-02-13 LAB
ESTRADIOL SERPL HS-MCNC: 40 PG/ML
TESTOST SERPL-MCNC: 302.21 NG/DL (ref 220.91–715.81)

## 2025-02-13 RX ORDER — TADALAFIL 5 MG/1
5 TABLET ORAL
Qty: 90 TABLET | Refills: 3 | Status: SHIPPED | OUTPATIENT
Start: 2025-02-13

## 2025-02-17 ENCOUNTER — HOSPITAL ENCOUNTER (EMERGENCY)
Facility: HOSPITAL | Age: 60
Discharge: HOME | End: 2025-02-17
Attending: FAMILY MEDICINE
Payer: COMMERCIAL

## 2025-02-17 VITALS
HEIGHT: 72 IN | WEIGHT: 252 LBS | RESPIRATION RATE: 20 BRPM | BODY MASS INDEX: 34.13 KG/M2 | OXYGEN SATURATION: 98 % | DIASTOLIC BLOOD PRESSURE: 89 MMHG | TEMPERATURE: 99 F | SYSTOLIC BLOOD PRESSURE: 138 MMHG | HEART RATE: 89 BPM

## 2025-02-17 DIAGNOSIS — R33.9 URINARY RETENTION: Primary | ICD-10-CM

## 2025-02-17 DIAGNOSIS — T85.698D: ICD-10-CM

## 2025-02-17 PROCEDURE — 99284 EMERGENCY DEPT VISIT MOD MDM: CPT

## 2025-02-17 RX ORDER — LIDOCAINE AND PRILOCAINE 25; 25 MG/G; MG/G
CREAM TOPICAL
Qty: 30 G | Refills: 0 | Status: SHIPPED | OUTPATIENT
Start: 2025-02-17 | End: 2025-02-27

## 2025-02-17 RX ORDER — PHENAZOPYRIDINE HYDROCHLORIDE 200 MG/1
200 TABLET, FILM COATED ORAL 3 TIMES DAILY
Qty: 10 TABLET | Refills: 0 | Status: SHIPPED | OUTPATIENT
Start: 2025-02-17 | End: 2025-02-20

## 2025-02-18 NOTE — ED PROVIDER NOTES
"Encounter Date: 2/17/2025       History     Chief Complaint   Patient presents with    Urinary Retention     Urinary retention, prostate sx on Friday, catheter removed this am.  Reinserted after urinary retention this afternoon.  Pt reports increased urination and possible clots and pain.       This patient is a 59-year-old male who comes in with urinary retention.  Patient has been seen before in this ER for urinary retention.  He actually had prostate surgery 3 days ago.  He went to his urologist this morning to have the Ortiz removed.  After few hours he had to go back to have it re inserted as he could not urinate.  Now, it has been in 4 hours and patient states that he can not urinate.  Nurse states that she flushed the catheter when he came in and he immediately drained 400 mL patient had instant relief    The history is provided by the patient.     Review of patient's allergies indicates:   Allergen Reactions    Oxycodone-acetaminophen Anxiety     Other Reaction(s): "out of body experience", Not available     Past Medical History:   Diagnosis Date    BPH (benign prostatic hyperplasia)     Hypertension     Liver cyst      Past Surgical History:   Procedure Laterality Date    COLONOSCOPY N/A 5/9/2024    Procedure: COLONOSCOPY;  Surgeon: Shun Bob MD;  Location: [facility];  Service: Gastroenterology;  Laterality: N/A;    SINUS SURGERY Bilateral     SPINE SURGERY      TONSILLECTOMY       Family History   Problem Relation Name Age of Onset    Heart disease Maternal Grandmother Drothy Gisela     Diabetes Maternal Grandmother Drothy Gisela     Hearing loss Maternal Grandmother Drothy Cornell     Cancer Paternal Grandmother Eithe Boullion     Heart disease Paternal Grandmother Eithe Boullion     Arthritis Father Blue     Vision loss Father Blue     Cancer Maternal Grandfather Madi Higgins      Social History[1]  Review of Systems   Constitutional: Negative.    HENT: Negative.     Respiratory: Negative. "     Cardiovascular: Negative.    Gastrointestinal: Negative.    Endocrine: Negative.    Genitourinary:  Positive for difficulty urinating.   Musculoskeletal: Negative.    Skin: Negative.    Neurological: Negative.    Psychiatric/Behavioral: Negative.     All other systems reviewed and are negative.      Physical Exam     Initial Vitals [02/17/25 1719]   BP Pulse Resp Temp SpO2   (!) 173/119 90 20 98.6 °F (37 °C) 97 %      MAP       --         Physical Exam    Nursing note and vitals reviewed.  Constitutional: He appears well-developed and well-nourished.   HENT:   Head: Normocephalic.   Eyes: Pupils are equal, round, and reactive to light.   Neck:   Normal range of motion.  Cardiovascular:  Normal rate and regular rhythm.           Pulmonary/Chest: Breath sounds normal.   Abdominal: Abdomen is soft. Bowel sounds are normal.   Severe abdominal pain due to urinary retention   Genitourinary:    Genitourinary Comments: Urinary retention     Musculoskeletal:         General: Normal range of motion.      Cervical back: Normal range of motion.     Neurological: He is alert and oriented to person, place, and time.   Skin: Skin is warm and dry.   Psychiatric: He has a normal mood and affect.         ED Course   Procedures  Labs Reviewed - No data to display       Imaging Results    None          Medications - No data to display  Medical Decision Making  This patient is a 59-year-old male who comes in for urinary retention.  He has a Ortiz in place because he had surgery on his prostate last Friday.  The Ortiz was flushed and started draining again  Differential diagnosis:  Urinary retention, Ortiz obstruction    Risk  Prescription drug management.                                      Clinical Impression:  Final diagnoses:  [R33.9] Urinary retention (Primary)  [T85.698D] Obstruction of catheter, subsequent encounter          ED Disposition Condition    Discharge Stable          ED Prescriptions       Medication Sig Dispense  Start Date End Date Auth. Provider    LIDOcaine-prilocaine (EMLA) cream Apply topically as needed. 30 g 2/17/2025 2/27/2025 Luisana Smith MD    phenazopyridine (PYRIDIUM) 200 MG tablet Take 1 tablet (200 mg total) by mouth 3 (three) times daily. for 3 days 10 tablet 2/17/2025 2/20/2025 Luisana Smith MD          Follow-up Information       Follow up With Specialties Details Why Contact Info    Jaylon Mishra MD Family Medicine Schedule an appointment as soon as possible for a visit in 3 days  [address] [facility] [address]  [phone]                 [1]   Social History  Tobacco Use    Smoking status: Never    Smokeless tobacco: Never    Tobacco comments:     N/A   Substance Use Topics    Alcohol use: Not Currently     Alcohol/week: 2.0 standard drinks of alcohol     Types: 2 Glasses of wine per week    Drug use: Never        Luisana Smith MD  02/17/25 [id number]

## 2025-02-26 ENCOUNTER — OFFICE VISIT (OUTPATIENT)
Dept: FAMILY MEDICINE | Facility: CLINIC | Age: 60
End: 2025-02-26
Payer: COMMERCIAL

## 2025-02-26 VITALS
WEIGHT: 252 LBS | RESPIRATION RATE: 18 BRPM | DIASTOLIC BLOOD PRESSURE: 78 MMHG | OXYGEN SATURATION: 96 % | TEMPERATURE: 97 F | BODY MASS INDEX: 34.13 KG/M2 | HEART RATE: 86 BPM | HEIGHT: 72 IN | SYSTOLIC BLOOD PRESSURE: 136 MMHG

## 2025-02-26 DIAGNOSIS — J40 BRONCHITIS: Primary | ICD-10-CM

## 2025-02-26 RX ORDER — HYOSCYAMINE SULFATE 0.12 MG/1
0.12 TABLET SUBLINGUAL EVERY 6 HOURS PRN
COMMUNITY
Start: 2025-02-15

## 2025-02-26 RX ORDER — AZITHROMYCIN 250 MG/1
TABLET, FILM COATED ORAL
Qty: 6 TABLET | Refills: 0 | Status: SHIPPED | OUTPATIENT
Start: 2025-02-26

## 2025-02-26 RX ORDER — DEXAMETHASONE SODIUM PHOSPHATE 10 MG/ML
10 INJECTION INTRAMUSCULAR; INTRAVENOUS ONCE
Status: COMPLETED | OUTPATIENT
Start: 2025-02-26 | End: 2025-02-26

## 2025-02-26 RX ADMIN — DEXAMETHASONE SODIUM PHOSPHATE 10 MG: 10 INJECTION INTRAMUSCULAR; INTRAVENOUS at 09:02

## 2025-02-26 NOTE — PROGRESS NOTES
"Subjective:     Patient ID: Rinku Herzog is a 59 y.o. male.    Chief Complaint: Cough, Nasal Congestion, and Chest Congestion        History of Present Illness    CHIEF COMPLAINT:  Patient presents today for follow up after prostate surgery with catheter removal.    GENITOURINARY:  He reports good urine output since catheter removal with ability to void 500 mL in one episode overnight. He expresses concern about maintaining adequate urinary flow. He has avoided OTC medications due to concerns about their potential negative effects on urinary flow.    RESPIRATORY:  He presents with a heavy cough accompanied by wheezing and mild shortness of breath. He experiences a burning sensation when coughing. The cough primarily affects the chest area. He reports minimal upper respiratory symptoms with no significant congestion, runny nose, or sore throat. He denies fever or chills.      ROS:  General: no fever, no chills  ENT: no nasal congestion, no sore throat, no runny nose  Respiratory: +cough, +shortness of breath, +wheezing            Review of Systems   Constitutional: Negative.    Cardiovascular:  Positive for chest pain (with cough).       Objective:     /78   Pulse 86   Temp 97.1 °F (36.2 °C) (Temporal)   Resp 18   Ht 5' 11.65" (1.82 m)   Wt 114.3 kg (252 lb)   SpO2 96%   BMI 34.51 kg/m²    Physical Exam  Constitutional:       Appearance: Normal appearance.   Cardiovascular:      Rate and Rhythm: Normal rate and regular rhythm.      Heart sounds: Normal heart sounds.   Pulmonary:      Effort: Pulmonary effort is normal.      Breath sounds: Rhonchi present. No wheezing or rales.   Neurological:      Mental Status: He is alert.   Psychiatric:         Mood and Affect: Mood normal.         Behavior: Behavior normal.         Thought Content: Thought content normal.         Judgment: Judgment normal.             Assessment:     Problem List Items Addressed This Visit    None  Visit Diagnoses         Bronchitis "    -  Primary    Relevant Medications    azithromycin (Z-COLBY) 250 MG tablet    dexAMETHasone injection 10 mg (Start on 2/26/2025 10:00 AM)             Plan:   1. Bronchitis  -     azithromycin (Z-COLBY) 250 MG tablet; Take 2 tablets by mouth on day 1; Take 1 tablet by mouth on days 2-5  Dispense: 6 tablet; Refill: 0  -     dexAMETHasone injection 10 mg  Rx for above medication  OTC medications as needed  Monitor  Return to clinic with concerns                    This note was generated with the assistance of ambient listening technology. Verbal consent was obtained by the patient and accompanying visitor(s) for the recording of patient appointment to facilitate this note. I attest to having reviewed and edited the generated note for accuracy, though some syntax or spelling errors may persist. Please contact the author of this note for any clarification.

## 2025-03-12 DIAGNOSIS — M48.02 SPINAL STENOSIS IN CERVICAL REGION: Primary | ICD-10-CM

## 2025-03-13 ENCOUNTER — CLINICAL SUPPORT (OUTPATIENT)
Dept: REHABILITATION | Facility: HOSPITAL | Age: 60
End: 2025-03-13
Payer: COMMERCIAL

## 2025-03-13 DIAGNOSIS — M54.2 NECK PAIN: Primary | ICD-10-CM

## 2025-03-13 DIAGNOSIS — Z98.1 S/P CERVICAL SPINAL FUSION: ICD-10-CM

## 2025-03-13 PROCEDURE — 97110 THERAPEUTIC EXERCISES: CPT

## 2025-03-13 PROCEDURE — 97162 PT EVAL MOD COMPLEX 30 MIN: CPT

## 2025-03-13 PROCEDURE — 97140 MANUAL THERAPY 1/> REGIONS: CPT

## 2025-03-13 NOTE — LETTER
March 17, 2025  Omar Lira MD  [address]  [facility] [address]  [facility] [address]    To whom it may concern,     The attached plan of care is being sent to you for review and reference.    You may indicate your approval by signing the document electronically, or by faxing/mailing a signed copy of the final page of this document back to the attention of Chandler Castellon, PT:         Plan of Care 3/17/25   Effective from: 3/17/2025  Effective to: 6/6/2025    Plan ID: [id number]            Participants as of Finalize on 3/17/2025    Name Type Comments Contact Info    Omar Lira MD Referring Provider  [phone]    Chandler Castellon, PT Physical Therapist         Last Plan Note     Author: Chandler Castellon, PT Status: Addendum Last edited: 3/13/2025  7:30 AM         Outpatient Rehab    Physical Therapy Evaluation    Patient Name: Rinku Herzog  MRN: [id number]  YOB: 1965  Encounter Date: 3/13/2025    Therapy Diagnosis:   Encounter Diagnoses   Name Primary?    Neck pain Yes    S/P cervical spinal fusion      Physician: Omar Lira, *    Physician Orders: Eval and Treat  Medical Diagnosis: Spinal stenosis in cervical region    Visit # / Visits Authorized:  1 / 50  Date of Evaluation: 3/13/2025  Insurance Authorization Period: 3/12/2025 to 3/12/2026  Plan of Care Certification:  3/13/2025 to 6/6/2025      PT/PTA:     Number of PTA visits since last PT visit:   Time In: 0728   Time Out: 0823  Total Time: 55   Total Billable Time: 55    Intake Outcome Measure for FOTO Survey    Therapist reviewed FOTO scores for Rinku Herzog on 3/13/2025.   FOTO report - see Media section or FOTO account episode details.     Intake Score: 22 (predicted 56 by visit #14)%         Subjective   History of Present Illness  Rinku is a 59 y.o. male who reports to physical therapy with a chief concern of Neck and Right upper trap pain, stiff neck.     The patient reports a medical diagnosis of Cervcal  Spondylosis with radiculopathy and cervical stenosis. The patient has experienced this issue since 01/07/25. Patient reports a surgery of Cervcal Spondylosis with radiculopathy and cervical stenosis. Surgery occurred on 01/07/25. Diagnostic tests related to this condition: MRI studies.   MRI Studies Details: Findings: severe cervical stenosis, less than 6mm canal due to right paracentral disc osteophyte complex compressing the cord with severe foraminal stenosis. At C5-C6 spurring causing moderate foraminal stenosis without cord compression. At C6-C7 cord indentation, 7mm canal, obliterated left sided foramen    Dominant Hand: Right  History of Present Condition/Illness: In October of last year patient reports that he woke up with R neck and shoulder pain. Denies any injury or inciting event. The pain gradually worsened and became severe with continued radiation out towards the deltoid. Sought out care from a Chiropractor and after a few adjustments he had numbness and tingling into his right arm down to the fingertips. This eventually went away but he never went back. He did have some weakness of the right arm. Saw Dr Alvarez who ordered an MRI which showed severe cervical stenosis, less than 6mm canal due to right paracentral disc osteophyte complex compressing the cord with severe foraminal stenosis. At C5-C6 spurring causing moderate foraminal stenosis without cord compression. At C6-C7 cord indentation, 7mm canal, obliterated left sided foramen. On 1/07/2025 he underwent a C4-C7 ACDF to decompress and stabilize his issues. The surgery went well but afterwards had issues with an enlarged prostate and inability to urinate. Underwent a second surgery this one on his prostate. Had a catheter for 21 days. Currently he is without a cervical collar, was removed on 2/6/2025. He is having some right cervical and upper trap pain with tenderness in his mid back. There is some neck stiffness.    Activities of Daily  Living  Social history was obtained from Patient.    General Prior Level of Function Comments: Independent  General Current Level of Function Comments: modified independent  Patient Roles: Caregiver for pet  Patient Responsibilities: Community mobility, Driving, Home management, Personal ADL, Yard work    Previously independent with activities of daily living? Yes     Currently independent with activities of daily living? Yes     Patient is able to perform all ADL's but takes longer time    Previously independent with instrumental activities of daily living? Yes     Currently independent with instrumental activities of daily living? Yes     Patient is able to perform all IADL's but takes longer time        Pain     Patient reports a current pain level of 2/10. Pain at best is reported as 0/10. Pain at worst is reported as 5/10.   Location: Right cervical, upper trap and mid back  Clinical Progression (since onset): Stable  Pain Qualities: Aching, Discomfort, Tenderness, Tightness, Throbbing, Dull  Pain-Relieving Factors: Activity modification, Change in position, Relaxation, Rest, Ice, Heat  Pain-Aggravating Factors: Bending, Exercise, Head movements, Driving, Rotation, Movement         Review of Systems  Patient denies: Bladder Incontinence, Bowel Incontinence, and Diabetes  Additional Review of Systems Details: Hypertension     Treatment History  Treatments  Previously Received Treatments: No  Currently Receiving Treatments: No    Living Arrangements  Living Situation  Housing: Home independently  Living Arrangements: Spouse/significant other  Support Systems: Children, Spouse/significant other    Home Setup  Type of Structure: House  Home Access: Level entry        Employment  Patient reports: Does the patient's condition impact their ability to work?  Employment Status: Employed full-time   Patient with surgical restrictions. Has not yet been cleared to return to work      Past Medical History/Physical Systems  "Review:   Rinku Herzog  has a past medical history of BPH (benign prostatic hyperplasia), Hypertension, and Liver cyst.    Rinku Herzog  has a past surgical history that includes Spine surgery; Tonsillectomy; Sinus surgery (Bilateral); and Colonoscopy (N/A, 5/9/2024).    Rinku has a current medication list which includes the following prescription(s): azithromycin, diazepam, gabapentin, hydrocodone-acetaminophen, hydroxyzine pamoate, hyoscyamine, lidocaine-prilocaine, lisinopril, methocarbamol, phenazopyridine, tadalafil, and tamsulosin, and the following Facility-Administered Medications: 0.9% nacl.    Review of patient's allergies indicates:   Allergen Reactions    Oxycodone-acetaminophen Anxiety     Other Reaction(s): "out of body experience", Not available        Objective   Bracing    Collar was removed 2/26/2025        Posture  Patient presents with a Forward head position.     Shoulders are Rounded.             Cervical Thoracic Sensation  General Cervical/Thoracic Sensation  Intact: Right and Left  Right Cervical/Thoracic Sensation  Intact: Light Touch, Static Two Point Discrimination, Dynamic Two Point Discrimination, Sharp/Dull Discrimination, Kinesthesia, and Proprioception       Left Cervical/Thoracic Sensation  Intact: Light Touch, Static Two Point Discrimination, Dynamic Two Point Discrimination, Sharp/Dull Discrimination, Kinesthesia, and Proprioception                Right Upper Extremity Reflexes  Deltoid, C5: Normal (2+)    Biceps, C5-C6: Normal (2+)    Brachioradialis, C6: Normal (2+)    Triceps, C7: Normal (2+)         Left Upper Extremity Reflexes  Deltoid, C5: Normal (2+)    Biceps, C5-C6: Normal (2+)    Brachioradialis, C6: Normal (2+)    Triceps, C7: Normal (2+)             Spinal Muscle Palpation  Right Spinal Muscle Palpation  Abnormal: Cervical/Thoracic  Right Cervical/Thoracic Muscle Palpation Observations: Tightness noted to the right upper trap and medial scapula are       Left Spinal " Muscle Palpation  Unremarkable: Cervical/Thoracic and Lumbar/Sacral          Vertebral Palpation  Vertebral Structures Palpated  Cervical: Right Transverse Process, Left Transverse Process, Right Facet Joint, Left Facet Joint, and Spinous Process  Thoracic: Right Transverse Process, Left Transverse Process, Right Facet Joint, Left Facet Joint, and Spinous Process  Right Transverse Process Palpation Details: mild tenderness at C5- C6  Left Transverse Process Palpation Details: WNL's  Right Facet Joint Palpation Details: mild tenderness at C5- C6  Left Facet Joint Palpation Details: WNL's  Spinous Process Palpation Details: mild tenderness at C5- C6; moderate tenderness at T4-T5    Hypomobility at T4-T5     Hip Palpation          Left Hip Palpation  Unremarkable: Lumbar/Sacral Muscle               Subcranial Range of Motion   Active Restricted? Passive Restricted? Pain   Flexion         Protraction         Retraction           Cervical Range of Motion   Active (deg) Passive (deg) Pain   Flexion 40  (avoided overpressure due to surgical restrictions)     Extension 35       Right Lateral Flexion 30  (avoided overpressure due to surgical restrictions)     Right Rotation 55  (avoided overpressure due to surgical restrictions)     Left Lateral Flexion 25  (avoided overpressure due to surgical restrictions)     Left Rotation 65  (avoided overpressure due to surgical restrictions)            Shoulder Range of Motion  Right Shoulder   Active (deg) Passive (deg) Pain   Flexion 180 180     Extension         Scaption         ABduction 170 180     ADduction         Horizontal ABduction         Horizontal ADduction         External Rotation (Shoulder ABducted 0 degrees)         External Rotation (Shoulder ABducted 45 degrees)         External Rotation (Shoulder ABducted 90 degrees) 85 90     Internal Rotation (Shoulder ABducted 0 degrees) 90 90     Internal Rotation (Shoulder ABducted 45 degrees)         Internal Rotation  (Shoulder ABducted 90 degrees)           Left Shoulder   Active (deg) Passive (deg) Pain   Flexion 180 180     Extension         Scaption         ABduction 175 180     ADduction         Horizontal ABduction         Horizontal ADduction         External Rotation (Shoulder ABducted 0 degrees)         External Rotation (Shoulder ABducted 45 degrees)         External Rotation (Shoulder ABducted 90 degrees) 90 90     Internal Rotation (Shoulder ABducted 0 degrees) 90 90     Internal Rotation (Shoulder ABducted 45 degrees)         Internal Rotation (Shoulder ABducted 90 degrees)                       Shoulder Strength - Planes of Motion   Right Strength Right Pain Left Strength Left  Pain   Flexion 4+   5     Extension 4+   5     ABduction 4+   5     ADduction 4+   5     Horizontal ABduction 4+   5     Horizontal ADduction 4+   5     Internal Rotation 0° 4+   5     Internal Rotation 90° 4+   5     External Rotation 0° 4+   5     External Rotation 90° 4+   5                      Ambulation Assistance Required  Surface With  Assistive Device Without Assistive Device Details   Level Independent        Uneven         Curb           Ambulation Details    Gait is WNL's without any deviations    Gait Analysis  Base of Support: Normal            Gait Analysis Details  Gait is WNL's         Treatment:  Therapeutic Exercise  TE 1: 15 mins; Chin tucks, scapula retractions, cat/camel, prone W's  Manual Therapy  MT 1: 10 mins; PA's at thoracic spinous processes, DTM at medial scap and right upper trap      Time Entry(in minutes):  PT Evaluation (Moderate) Time Entry: 30  Manual Therapy Time Entry: 10  Therapeutic Exercise Time Entry: 15    Assessment & Plan   Assessment  Rinku presents with a condition of Moderate complexity.   Presentation of Symptoms: Stable  Will Comorbidities Impact Care: No       Functional Limitations: Activity tolerance, Carrying objects, Driving, Functional mobility, Pain with ADLs/IADLs, Range of  motion  Impairments: Pain with functional activity  Personal Factors Affecting Prognosis: Pain    Prognosis: Excellent  Assessment Details: Patient is a 59 y.o. male referred to physical therapy services for aftercare following C4-C7 ACDF. He presents with pain, decreased cervical ROM, decreased mobility of thoracic vertebra, weakness of the right upper extremity when compared to the left, decreased functional capacity with surgical restrictions. Patient will benefit from skilled outpatient Physical Therapy to address the deficits stated above and in the chart below, provide education, and to maximize patient's level of independence    Plan  From a physical therapy perspective, the patient would benefit from: Skilled Rehab Services    Planned therapy interventions include: Therapeutic exercise, Therapeutic activities, Neuromuscular re-education, and Manual therapy.    Planned modalities to include: Mechanical traction, Electrical stimulation - passive/unattended, Contrast bath, Thermotherapy (hot pack), and Ultrasound.        Visit Frequency: 3 times Per Week for 12 Weeks.       This plan was discussed with Patient.   Discussion participants: Agreed Upon Plan of Care             Patient's spiritual, cultural, and educational needs considered and patient agreeable to plan of care and goals.           Goals:   Active       LTG       Patient will report at least 20% disability reduction from initial NDI score to indicate clinically significant functional improvement         Start:  03/16/25    Expected End:  06/06/25            Patient will report at least 30 point increase on initial FOTO score to indicate clinically significant functional improvement         Start:  03/16/25    Expected End:  06/06/25            Patient will report 80% overall perceived improvement        Start:  03/16/25    Expected End:  06/06/25            Patient will demonstrate independence and compliance with final HEP        Start:  03/16/25     Expected End:  06/06/25               STG       Patient will report at least 10% disability reduction from initial NDI score to indicate clinically significant functional improvement         Start:  03/16/25    Expected End:  04/25/25            Patient will report at least 10 pt increase on initial FOTO score to indicate clinically significant functional improvement         Start:  03/16/25    Expected End:  04/25/25            Patient will report 50% overall perceived improvement        Start:  03/16/25    Expected End:  04/25/25            Patient will demonstrate independence and compliance with HEP        Start:  03/16/25    Expected End:  04/25/25                Chandler Castellon PT           Current Participants as of 3/17/2025    Name Type Comments Contact Info    Omar Lira MD Referring Provider  [phone]    Signature pending    Chandler Castellon PT Physical Therapist                  Sincerely,      Chandler Castellon PT  [facility]                                                            Dear Chandler Castellon PT,    RE: Mr. Rinku Herzog, MRN: [id number]    I certify that I have reviewed the attached plan of care and agree to the details within.        ___________________________  ___________________________  Provider Printed Name   Provider Signed Name      ___________________________  Date and Time

## 2025-03-17 NOTE — PROGRESS NOTES
Outpatient Rehab    Physical Therapy Evaluation    Patient Name: Rinku Herzog  MRN: [id number]  YOB: 1965  Encounter Date: 3/13/2025    Therapy Diagnosis:   Encounter Diagnoses   Name Primary?    Neck pain Yes    S/P cervical spinal fusion      Physician: Omar Lira, *    Physician Orders: Eval and Treat  Medical Diagnosis: Spinal stenosis in cervical region    Visit # / Visits Authorized:  1 / 50  Date of Evaluation: 3/13/2025  Insurance Authorization Period: 3/12/2025 to 3/12/2026  Plan of Care Certification:  3/13/2025 to 6/6/2025      PT/PTA:     Number of PTA visits since last PT visit:   Time In: 0728   Time Out: 0823  Total Time: 55   Total Billable Time: 55    Intake Outcome Measure for FOTO Survey    Therapist reviewed FOTO scores for Rinku Herzog on 3/13/2025.   FOTO report - see Media section or FOTO account episode details.     Intake Score: 22 (predicted 56 by visit #14)%         Subjective   History of Present Illness  Rinku is a 59 y.o. male who reports to physical therapy with a chief concern of Neck and Right upper trap pain, stiff neck.     The patient reports a medical diagnosis of Cervcal Spondylosis with radiculopathy and cervical stenosis. The patient has experienced this issue since 01/07/25. Patient reports a surgery of Cervcal Spondylosis with radiculopathy and cervical stenosis. Surgery occurred on 01/07/25. Diagnostic tests related to this condition: MRI studies.   MRI Studies Details: Findings: severe cervical stenosis, less than 6mm canal due to right paracentral disc osteophyte complex compressing the cord with severe foraminal stenosis. At C5-C6 spurring causing moderate foraminal stenosis without cord compression. At C6-C7 cord indentation, 7mm canal, obliterated left sided foramen    Dominant Hand: Right  History of Present Condition/Illness: In October of last year patient reports that he woke up with R neck and shoulder pain. Denies any injury or  inciting event. The pain gradually worsened and became severe with continued radiation out towards the deltoid. Sought out care from a Chiropractor and after a few adjustments he had numbness and tingling into his right arm down to the fingertips. This eventually went away but he never went back. He did have some weakness of the right arm. Saw Dr Alvarez who ordered an MRI which showed severe cervical stenosis, less than 6mm canal due to right paracentral disc osteophyte complex compressing the cord with severe foraminal stenosis. At C5-C6 spurring causing moderate foraminal stenosis without cord compression. At C6-C7 cord indentation, 7mm canal, obliterated left sided foramen. On 1/07/2025 he underwent a C4-C7 ACDF to decompress and stabilize his issues. The surgery went well but afterwards had issues with an enlarged prostate and inability to urinate. Underwent a second surgery this one on his prostate. Had a catheter for 21 days. Currently he is without a cervical collar, was removed on 2/6/2025. He is having some right cervical and upper trap pain with tenderness in his mid back. There is some neck stiffness.    Activities of Daily Living  Social history was obtained from Patient.    General Prior Level of Function Comments: Independent  General Current Level of Function Comments: modified independent  Patient Roles: Caregiver for pet  Patient Responsibilities: Community mobility, Driving, Home management, Personal ADL, Yard work    Previously independent with activities of daily living? Yes     Currently independent with activities of daily living? Yes     Patient is able to perform all ADL's but takes longer time    Previously independent with instrumental activities of daily living? Yes     Currently independent with instrumental activities of daily living? Yes     Patient is able to perform all IADL's but takes longer time        Pain     Patient reports a current pain level of 2/10. Pain at best is  "reported as 0/10. Pain at worst is reported as 5/10.   Location: Right cervical, upper trap and mid back  Clinical Progression (since onset): Stable  Pain Qualities: Aching, Discomfort, Tenderness, Tightness, Throbbing, Dull  Pain-Relieving Factors: Activity modification, Change in position, Relaxation, Rest, Ice, Heat  Pain-Aggravating Factors: Bending, Exercise, Head movements, Driving, Rotation, Movement         Review of Systems  Patient denies: Bladder Incontinence, Bowel Incontinence, and Diabetes  Additional Review of Systems Details: Hypertension     Treatment History  Treatments  Previously Received Treatments: No  Currently Receiving Treatments: No    Living Arrangements  Living Situation  Housing: Home independently  Living Arrangements: Spouse/significant other  Support Systems: Children, Spouse/significant other    Home Setup  Type of Structure: House  Home Access: Level entry        Employment  Patient reports: Does the patient's condition impact their ability to work?  Employment Status: Employed full-time   Patient with surgical restrictions. Has not yet been cleared to return to work      Past Medical History/Physical Systems Review:   Rinku Herzog  has a past medical history of BPH (benign prostatic hyperplasia), Hypertension, and Liver cyst.    Rinku Herzog  has a past surgical history that includes Spine surgery; Tonsillectomy; Sinus surgery (Bilateral); and Colonoscopy (N/A, 5/9/2024).    Rinku has a current medication list which includes the following prescription(s): azithromycin, diazepam, gabapentin, hydrocodone-acetaminophen, hydroxyzine pamoate, hyoscyamine, lidocaine-prilocaine, lisinopril, methocarbamol, phenazopyridine, tadalafil, and tamsulosin, and the following Facility-Administered Medications: 0.9% nacl.    Review of patient's allergies indicates:   Allergen Reactions    Oxycodone-acetaminophen Anxiety     Other Reaction(s): "out of body experience", Not available        Objective "   Bracing    Collar was removed 2/26/2025        Posture  Patient presents with a Forward head position.     Shoulders are Rounded.             Cervical Thoracic Sensation  General Cervical/Thoracic Sensation  Intact: Right and Left  Right Cervical/Thoracic Sensation  Intact: Light Touch, Static Two Point Discrimination, Dynamic Two Point Discrimination, Sharp/Dull Discrimination, Kinesthesia, and Proprioception       Left Cervical/Thoracic Sensation  Intact: Light Touch, Static Two Point Discrimination, Dynamic Two Point Discrimination, Sharp/Dull Discrimination, Kinesthesia, and Proprioception                Right Upper Extremity Reflexes  Deltoid, C5: Normal (2+)    Biceps, C5-C6: Normal (2+)    Brachioradialis, C6: Normal (2+)    Triceps, C7: Normal (2+)         Left Upper Extremity Reflexes  Deltoid, C5: Normal (2+)    Biceps, C5-C6: Normal (2+)    Brachioradialis, C6: Normal (2+)    Triceps, C7: Normal (2+)             Spinal Muscle Palpation  Right Spinal Muscle Palpation  Abnormal: Cervical/Thoracic  Right Cervical/Thoracic Muscle Palpation Observations: Tightness noted to the right upper trap and medial scapula are       Left Spinal Muscle Palpation  Unremarkable: Cervical/Thoracic and Lumbar/Sacral          Vertebral Palpation  Vertebral Structures Palpated  Cervical: Right Transverse Process, Left Transverse Process, Right Facet Joint, Left Facet Joint, and Spinous Process  Thoracic: Right Transverse Process, Left Transverse Process, Right Facet Joint, Left Facet Joint, and Spinous Process  Right Transverse Process Palpation Details: mild tenderness at C5- C6  Left Transverse Process Palpation Details: WNL's  Right Facet Joint Palpation Details: mild tenderness at C5- C6  Left Facet Joint Palpation Details: WNL's  Spinous Process Palpation Details: mild tenderness at C5- C6; moderate tenderness at T4-T5    Hypomobility at T4-T5     Hip Palpation          Left Hip Palpation  Unremarkable: Lumbar/Sacral  Muscle               Subcranial Range of Motion   Active Restricted? Passive Restricted? Pain   Flexion         Protraction         Retraction           Cervical Range of Motion   Active (deg) Passive (deg) Pain   Flexion 40  (avoided overpressure due to surgical restrictions)     Extension 35       Right Lateral Flexion 30  (avoided overpressure due to surgical restrictions)     Right Rotation 55  (avoided overpressure due to surgical restrictions)     Left Lateral Flexion 25  (avoided overpressure due to surgical restrictions)     Left Rotation 65  (avoided overpressure due to surgical restrictions)            Shoulder Range of Motion  Right Shoulder   Active (deg) Passive (deg) Pain   Flexion 180 180     Extension         Scaption         ABduction 170 180     ADduction         Horizontal ABduction         Horizontal ADduction         External Rotation (Shoulder ABducted 0 degrees)         External Rotation (Shoulder ABducted 45 degrees)         External Rotation (Shoulder ABducted 90 degrees) 85 90     Internal Rotation (Shoulder ABducted 0 degrees) 90 90     Internal Rotation (Shoulder ABducted 45 degrees)         Internal Rotation (Shoulder ABducted 90 degrees)           Left Shoulder   Active (deg) Passive (deg) Pain   Flexion 180 180     Extension         Scaption         ABduction 175 180     ADduction         Horizontal ABduction         Horizontal ADduction         External Rotation (Shoulder ABducted 0 degrees)         External Rotation (Shoulder ABducted 45 degrees)         External Rotation (Shoulder ABducted 90 degrees) 90 90     Internal Rotation (Shoulder ABducted 0 degrees) 90 90     Internal Rotation (Shoulder ABducted 45 degrees)         Internal Rotation (Shoulder ABducted 90 degrees)                       Shoulder Strength - Planes of Motion   Right Strength Right Pain Left Strength Left  Pain   Flexion 4+   5     Extension 4+   5     ABduction 4+   5     ADduction 4+   5     Horizontal  ABduction 4+   5     Horizontal ADduction 4+   5     Internal Rotation 0° 4+   5     Internal Rotation 90° 4+   5     External Rotation 0° 4+   5     External Rotation 90° 4+   5                      Ambulation Assistance Required  Surface With  Assistive Device Without Assistive Device Details   Level Independent        Uneven         Curb           Ambulation Details    Gait is WNL's without any deviations    Gait Analysis  Base of Support: Normal            Gait Analysis Details  Gait is WNL's         Treatment:  Therapeutic Exercise  TE 1: 15 mins; Chin tucks, scapula retractions, cat/camel, prone W's  Manual Therapy  MT 1: 10 mins; PA's at thoracic spinous processes, DTM at medial scap and right upper trap      Time Entry(in minutes):  PT Evaluation (Moderate) Time Entry: 30  Manual Therapy Time Entry: 10  Therapeutic Exercise Time Entry: 15    Assessment & Plan   Assessment  Rinku presents with a condition of Moderate complexity.   Presentation of Symptoms: Stable  Will Comorbidities Impact Care: No       Functional Limitations: Activity tolerance, Carrying objects, Driving, Functional mobility, Pain with ADLs/IADLs, Range of motion  Impairments: Pain with functional activity  Personal Factors Affecting Prognosis: Pain    Prognosis: Excellent  Assessment Details: Patient is a 59 y.o. male referred to physical therapy services for aftercare following C4-C7 ACDF. He presents with pain, decreased cervical ROM, decreased mobility of thoracic vertebra, weakness of the right upper extremity when compared to the left, decreased functional capacity with surgical restrictions. Patient will benefit from skilled outpatient Physical Therapy to address the deficits stated above and in the chart below, provide education, and to maximize patient's level of independence    Plan  From a physical therapy perspective, the patient would benefit from: Skilled Rehab Services    Planned therapy interventions include: Therapeutic  exercise, Therapeutic activities, Neuromuscular re-education, and Manual therapy.    Planned modalities to include: Mechanical traction, Electrical stimulation - passive/unattended, Contrast bath, Thermotherapy (hot pack), and Ultrasound.        Visit Frequency: 3 times Per Week for 12 Weeks.       This plan was discussed with Patient.   Discussion participants: Agreed Upon Plan of Care             Patient's spiritual, cultural, and educational needs considered and patient agreeable to plan of care and goals.           Goals:   Active       LTG       Patient will report at least 20% disability reduction from initial NDI score to indicate clinically significant functional improvement         Start:  03/16/25    Expected End:  06/06/25            Patient will report at least 30 point increase on initial FOTO score to indicate clinically significant functional improvement         Start:  03/16/25    Expected End:  06/06/25            Patient will report 80% overall perceived improvement        Start:  03/16/25    Expected End:  06/06/25            Patient will demonstrate independence and compliance with final HEP        Start:  03/16/25    Expected End:  06/06/25               STG       Patient will report at least 10% disability reduction from initial NDI score to indicate clinically significant functional improvement         Start:  03/16/25    Expected End:  04/25/25            Patient will report at least 10 pt increase on initial FOTO score to indicate clinically significant functional improvement         Start:  03/16/25    Expected End:  04/25/25            Patient will report 50% overall perceived improvement        Start:  03/16/25    Expected End:  04/25/25            Patient will demonstrate independence and compliance with HEP        Start:  03/16/25    Expected End:  04/25/25                Chandler Castellon, PT

## 2025-03-18 ENCOUNTER — CLINICAL SUPPORT (OUTPATIENT)
Dept: REHABILITATION | Facility: HOSPITAL | Age: 60
End: 2025-03-18
Payer: COMMERCIAL

## 2025-03-18 DIAGNOSIS — Z98.1 S/P CERVICAL SPINAL FUSION: ICD-10-CM

## 2025-03-18 DIAGNOSIS — M48.02 SPINAL STENOSIS IN CERVICAL REGION: ICD-10-CM

## 2025-03-18 DIAGNOSIS — M54.2 NECK PAIN: Primary | ICD-10-CM

## 2025-03-18 PROCEDURE — 97110 THERAPEUTIC EXERCISES: CPT

## 2025-03-18 PROCEDURE — 97140 MANUAL THERAPY 1/> REGIONS: CPT

## 2025-03-18 PROCEDURE — 97014 ELECTRIC STIMULATION THERAPY: CPT

## 2025-03-18 NOTE — PROGRESS NOTES
Outpatient Rehab    Physical Therapy Visit    Patient Name: Rinku Herzog  MRN: [id number]  YOB: 1965  Encounter Date: 3/18/2025    Therapy Diagnosis:   Encounter Diagnoses   Name Primary?    Neck pain Yes    S/P cervical spinal fusion     Spinal stenosis in cervical region      Physician: Omar Lira, *    Physician Orders: Eval and Treat  Medical Diagnosis: Spinal stenosis in cervical region     Visit # / Visits Authorized:  2 / 50  Date of Evaluation: 3/13/2025  Insurance Authorization Period: 3/12/2025 to 3/12/2026  Plan of Care Certification:  3/13/2025 to 6/6/2025                  Time In: 0717   Time Out: 0827  Total Time: 70   Total Billable Time: 70    FOTO:    Therapist reviewed FOTO scores for Rinku Herzog on 3/13/2025.   FOTO report - see Media section or FOTO account episode details.      Intake: Patient's Physical FS Primary Measure: 22 (goal is 56 @ visit #14)  Intake: Risk Adjusted Statistical FOTO: 51  Intake: Limitation Score: 78%  Intake: Category: Carrying  Intake: NDI:  78.0% (higher score greater disability)     Intake Score: 22 (predicted 56 by visit #14)%         Subjective   Rinku reports that he is doing pretty well, just having some soreness.  Pain reported as 2/10.      Objective       Cervical Range of Motion    Active (deg) Passive (deg) Pain   Flexion 40  (avoided overpressure due to surgical restrictions)     Extension 35       Right Lateral Flexion 30  (avoided overpressure due to surgical restrictions)     Right Rotation 55  (avoided overpressure due to surgical restrictions)     Left Lateral Flexion 25  (avoided overpressure due to surgical restrictions)     Left Rotation 65  (avoided overpressure due to surgical restrictions)              Shoulder Range of Motion  Right Shoulder    Active (deg) Passive (deg) Pain   Flexion 180 180     Extension         Scaption         ABduction 170 180     ADduction         Horizontal ABduction         Horizontal  ADduction         External Rotation (Shoulder ABducted 0 degrees)         External Rotation (Shoulder ABducted 45 degrees)         External Rotation (Shoulder ABducted 90 degrees) 85 90     Internal Rotation (Shoulder ABducted 0 degrees) 90 90     Internal Rotation (Shoulder ABducted 45 degrees)         Internal Rotation (Shoulder ABducted 90 degrees)            Left Shoulder    Active (deg) Passive (deg) Pain   Flexion 180 180     Extension         Scaption         ABduction 175 180     ADduction         Horizontal ABduction         Horizontal ADduction         External Rotation (Shoulder ABducted 0 degrees)         External Rotation (Shoulder ABducted 45 degrees)         External Rotation (Shoulder ABducted 90 degrees) 90 90     Internal Rotation (Shoulder ABducted 0 degrees) 90 90     Internal Rotation (Shoulder ABducted 45 degrees)         Internal Rotation (Shoulder ABducted 90 degrees)                  Shoulder Strength - Planes of Motion    Right Strength Right Pain Left Strength Left  Pain   Flexion 4+   5     Extension 4+   5     ABduction 4+   5     ADduction 4+   5     Horizontal ABduction 4+   5     Horizontal ADduction 4+   5     Internal Rotation 0° 4+   5     Internal Rotation 90° 4+   5     External Rotation 0° 4+   5     External Rotation 90° 4+   5                  Treatment:  Therapeutic Exercise  TE 1: 40 mins; Chin tucks, scapula retractions, scap elevations, front headlifts, open book R, R lateral head lifts, open book L, L lateral head lifts, prone head lifts, prone superman, prone W's, cat/camel, banded rows, banded ext, banded ER, banded IR, horizontal abd, bilateral ER, NuStep  Manual Therapy  MT 1: 15 mins; PA's at thoracic spinous processes, DTM at medial scap and right upper trap, cervical isometrics  Modalities  Electrical Stimulation (Parameters): 15 mins; IFC e-stim with moist heat post ex    Time Entry(in minutes): 70  E-Stim (Unattended) Time Entry: 15 (IFC e-stim with moist heat  post ex)  Manual Therapy Time Entry: 15  Therapeutic Exercise Time Entry: 40    Assessment & Plan   Assessment: Initiated the plan of care. Rinku was put through cervical and scapula stabilizations exercises and there were no issues during the session. There remains tightness to T5-T8 thoracic vertebra. Did some PA's and this was helpful as he had less tenderness afterwards.  Evaluation/Treatment Tolerance: Patient tolerated treatment well    Patient will continue to benefit from skilled outpatient physical therapy to address the deficits listed in the problem list box on initial evaluation, provide pt/family education and to maximize pt's level of independence in the home and community environment.     Patient's spiritual, cultural, and educational needs considered and patient agreeable to plan of care and goals.           Plan: Continue with the plan of care. Will advance as tolerated and per protocol.    Goals:   Active       LTG       Patient will report at least 20% disability reduction from initial NDI score to indicate clinically significant functional improvement   (Progressing)       Start:  03/16/25    Expected End:  06/06/25            Patient will report at least 30 point increase on initial FOTO score to indicate clinically significant functional improvement   (Progressing)       Start:  03/16/25    Expected End:  06/06/25            Patient will report 80% overall perceived improvement  (Progressing)       Start:  03/16/25    Expected End:  06/06/25            Patient will demonstrate independence and compliance with final HEP  (Progressing)       Start:  03/16/25    Expected End:  06/06/25               STG       Patient will report at least 10% disability reduction from initial NDI score to indicate clinically significant functional improvement   (Progressing)       Start:  03/16/25    Expected End:  04/25/25            Patient will report at least 10 pt increase on initial FOTO score to indicate  clinically significant functional improvement   (Progressing)       Start:  03/16/25    Expected End:  04/25/25            Patient will report 50% overall perceived improvement  (Progressing)       Start:  03/16/25    Expected End:  04/25/25            Patient will demonstrate independence and compliance with HEP  (Progressing)       Start:  03/16/25    Expected End:  04/25/25                Chandler Castellon PT

## 2025-03-19 ENCOUNTER — CLINICAL SUPPORT (OUTPATIENT)
Dept: REHABILITATION | Facility: HOSPITAL | Age: 60
End: 2025-03-19
Payer: COMMERCIAL

## 2025-03-19 DIAGNOSIS — M48.02 SPINAL STENOSIS IN CERVICAL REGION: ICD-10-CM

## 2025-03-19 DIAGNOSIS — Z98.1 S/P CERVICAL SPINAL FUSION: ICD-10-CM

## 2025-03-19 DIAGNOSIS — M54.2 NECK PAIN: Primary | ICD-10-CM

## 2025-03-19 PROCEDURE — 97110 THERAPEUTIC EXERCISES: CPT

## 2025-03-19 PROCEDURE — 97014 ELECTRIC STIMULATION THERAPY: CPT

## 2025-03-19 PROCEDURE — 97140 MANUAL THERAPY 1/> REGIONS: CPT

## 2025-03-19 NOTE — PROGRESS NOTES
Outpatient Rehab    Physical Therapy Visit    Patient Name: Rinku Herzog  MRN: [id number]  YOB: 1965  Encounter Date: 3/19/2025    Therapy Diagnosis:   Encounter Diagnoses   Name Primary?    Neck pain Yes    S/P cervical spinal fusion     Spinal stenosis in cervical region      Physician: Omar Lira, *    Physician Orders: Eval and Treat  Medical Diagnosis: Spinal stenosis in cervical region     Visit # / Visits Authorized:  3/ 50  Date of Evaluation: 3/13/2025  Insurance Authorization Period: 3/12/2025 to 3/12/2026  Plan of Care Certification:  3/13/2025 to 6/6/2025      Time In: 0728   Time Out: 0838  Total Time: 70   Total Billable Time: 70    FOTO:  Therapist reviewed FOTO scores for Rinku Herzog on 3/13/2025.   FOTO report - see Media section or FOTO account episode details.      Intake: Patient's Physical FS Primary Measure: 22 (goal is 56 @ visit #14)  Intake: Risk Adjusted Statistical FOTO: 51  Intake: Limitation Score: 78%  Intake: Category: Carrying  Intake: NDI:  78.0% (higher score greater disability)      Intake Score: 22 (predicted 56 by visit #14)%       Subjective   Rinku reports that he did some yard work yesterday and is sore but having no pain. Soreness in the mid back area..  Pain reported as 0/10.      Objective    Cervical Range of Motion    Active (deg) Passive (deg) Pain   Flexion 40  (avoided overpressure due to surgical restrictions)     Extension 35       Right Lateral Flexion 30  (avoided overpressure due to surgical restrictions)     Right Rotation 55  (avoided overpressure due to surgical restrictions)     Left Lateral Flexion 25  (avoided overpressure due to surgical restrictions)     Left Rotation 65  (avoided overpressure due to surgical restrictions)              Shoulder Range of Motion  Right Shoulder    Active (deg) Passive (deg) Pain   Flexion 180 180     Extension         Scaption         ABduction 170 180     ADduction         Horizontal ABduction          Horizontal ADduction         External Rotation (Shoulder ABducted 0 degrees)         External Rotation (Shoulder ABducted 45 degrees)         External Rotation (Shoulder ABducted 90 degrees) 85 90     Internal Rotation (Shoulder ABducted 0 degrees) 90 90     Internal Rotation (Shoulder ABducted 45 degrees)         Internal Rotation (Shoulder ABducted 90 degrees)            Left Shoulder    Active (deg) Passive (deg) Pain   Flexion 180 180     Extension         Scaption         ABduction 175 180     ADduction         Horizontal ABduction         Horizontal ADduction         External Rotation (Shoulder ABducted 0 degrees)         External Rotation (Shoulder ABducted 45 degrees)         External Rotation (Shoulder ABducted 90 degrees) 90 90     Internal Rotation (Shoulder ABducted 0 degrees) 90 90     Internal Rotation (Shoulder ABducted 45 degrees)         Internal Rotation (Shoulder ABducted 90 degrees)                  Shoulder Strength - Planes of Motion    Right Strength Right Pain Left Strength Left  Pain   Flexion 4+   5     Extension 4+   5     ABduction 4+   5     ADduction 4+   5     Horizontal ABduction 4+   5     Horizontal ADduction 4+   5     Internal Rotation 0° 4+   5     Internal Rotation 90° 4+   5     External Rotation 0° 4+   5     External Rotation 90° 4+   5                 Treatment:  Therapeutic Exercise  TE 1: 40 mins; Chin tucks, scapula retractions, scap elevations, front headlifts, open book R, R lateral head lifts, open book L, L lateral head lifts, prone head lifts, prone superman, prone W's, cat/camel, banded rows, banded ext, banded ER, banded IR, horizontal abd, bilateral ER, NuStep  Manual Therapy  MT 1: 15 mins; PA's at thoracic spinous processes, DTM at medial scap and right upper trap, cervical isometrics  Modalities  Electrical Stimulation (Parameters): 15 mins; IFC e-stim with moist heat post ex    Time Entry(in minutes): 70  E-Stim (Unattended) Time Entry: 15 (IFC e-stim  with moist heat to the low back)  Manual Therapy Time Entry: 15  Therapeutic Exercise Time Entry: 40    Assessment & Plan   Assessment: Rinku demonstrated high tenderness at T5-T7 with thoracic spine with PA's. Did a lot of manual work that included rib mobilizations as well and it lossened things up  and he had less tenderness. Performance with all exercises was good.  Evaluation/Treatment Tolerance: Patient tolerated treatment well    Patient will continue to benefit from skilled outpatient physical therapy to address the deficits listed in the problem list box on initial evaluation, provide pt/family education and to maximize pt's level of independence in the home and community environment.     Patient's spiritual, cultural, and educational needs considered and patient agreeable to plan of care and goals.           Plan: Continue with the plan of care. Will advance as tolerated and per protocol.    Goals:   Active       LTG       Patient will report at least 20% disability reduction from initial NDI score to indicate clinically significant functional improvement   (Progressing)       Start:  03/16/25    Expected End:  06/06/25            Patient will report at least 30 point increase on initial FOTO score to indicate clinically significant functional improvement   (Progressing)       Start:  03/16/25    Expected End:  06/06/25            Patient will report 80% overall perceived improvement  (Progressing)       Start:  03/16/25    Expected End:  06/06/25            Patient will demonstrate independence and compliance with final HEP  (Progressing)       Start:  03/16/25    Expected End:  06/06/25               STG       Patient will report at least 10% disability reduction from initial NDI score to indicate clinically significant functional improvement   (Progressing)       Start:  03/16/25    Expected End:  04/25/25            Patient will report at least 10 pt increase on initial FOTO score to indicate clinically  significant functional improvement   (Progressing)       Start:  03/16/25    Expected End:  04/25/25            Patient will report 50% overall perceived improvement  (Progressing)       Start:  03/16/25    Expected End:  04/25/25            Patient will demonstrate independence and compliance with HEP  (Progressing)       Start:  03/16/25    Expected End:  04/25/25                Chandler Castellon PT

## 2025-03-21 ENCOUNTER — CLINICAL SUPPORT (OUTPATIENT)
Dept: REHABILITATION | Facility: HOSPITAL | Age: 60
End: 2025-03-21
Payer: COMMERCIAL

## 2025-03-21 DIAGNOSIS — M48.02 SPINAL STENOSIS IN CERVICAL REGION: ICD-10-CM

## 2025-03-21 DIAGNOSIS — Z98.1 S/P CERVICAL SPINAL FUSION: ICD-10-CM

## 2025-03-21 DIAGNOSIS — M54.2 NECK PAIN: Primary | ICD-10-CM

## 2025-03-21 PROCEDURE — 97014 ELECTRIC STIMULATION THERAPY: CPT

## 2025-03-21 PROCEDURE — 97110 THERAPEUTIC EXERCISES: CPT

## 2025-03-21 PROCEDURE — 97140 MANUAL THERAPY 1/> REGIONS: CPT

## 2025-03-21 NOTE — PROGRESS NOTES
Outpatient Rehab    Physical Therapy Visit    Patient Name: Rinku Herzog  MRN: [id number]  YOB: 1965  Encounter Date: 3/21/2025    Therapy Diagnosis:   Encounter Diagnoses   Name Primary?    Neck pain Yes    S/P cervical spinal fusion     Spinal stenosis in cervical region      Physician: Omar Lira, *    Physician Orders: Eval and Treat  Medical Diagnosis: Spinal stenosis in cervical region     Visit # / Visits Authorized:  3/ 50  Date of Evaluation: 3/13/2025  Insurance Authorization Period: 3/12/2025 to 3/12/2026  Plan of Care Certification:  3/13/2025 to 6/6/2025      Time In: 0724   Time Out: 0834  Total Time: 70   Total Billable Time: 70    FOTO:  Therapist reviewed FOTO scores for Rinku Herzog on 3/13/2025.   FOTO report - see Media section or FOTO account episode details.      Intake: Patient's Physical FS Primary Measure: 22 (goal is 56 @ visit #14)  Intake: Risk Adjusted Statistical FOTO: 51  Intake: Limitation Score: 78%  Intake: Category: Carrying  Intake: NDI:  78.0% (higher score greater disability)      Intake Score: 22 (predicted 56 by visit #14)%       Subjective   Rinku continued with some yard work yesterday and there is some sorenss but no real pain. Soreness in the mid back area and right yupper trap area. Noticing that is sitting up to long the neck starts to hurt some. Better when moving around..  Pain reported as 0/10.      Objective    Cervical Range of Motion    Active (deg) Passive (deg) Pain   Flexion 40  (avoided overpressure due to surgical restrictions)     Extension 35       Right Lateral Flexion 30  (avoided overpressure due to surgical restrictions)     Right Rotation 55  (avoided overpressure due to surgical restrictions)     Left Lateral Flexion 25  (avoided overpressure due to surgical restrictions)     Left Rotation 65  (avoided overpressure due to surgical restrictions)              Shoulder Range of Motion  Right Shoulder    Active (deg) Passive  (deg) Pain   Flexion 180 180     Extension         Scaption         ABduction 170 180     ADduction         Horizontal ABduction         Horizontal ADduction         External Rotation (Shoulder ABducted 0 degrees)         External Rotation (Shoulder ABducted 45 degrees)         External Rotation (Shoulder ABducted 90 degrees) 85 90     Internal Rotation (Shoulder ABducted 0 degrees) 90 90     Internal Rotation (Shoulder ABducted 45 degrees)         Internal Rotation (Shoulder ABducted 90 degrees)            Left Shoulder    Active (deg) Passive (deg) Pain   Flexion 180 180     Extension         Scaption         ABduction 175 180     ADduction         Horizontal ABduction         Horizontal ADduction         External Rotation (Shoulder ABducted 0 degrees)         External Rotation (Shoulder ABducted 45 degrees)         External Rotation (Shoulder ABducted 90 degrees) 90 90     Internal Rotation (Shoulder ABducted 0 degrees) 90 90     Internal Rotation (Shoulder ABducted 45 degrees)         Internal Rotation (Shoulder ABducted 90 degrees)                  Shoulder Strength - Planes of Motion    Right Strength Right Pain Left Strength Left  Pain   Flexion 4+   5     Extension 4+   5     ABduction 4+   5     ADduction 4+   5     Horizontal ABduction 4+   5     Horizontal ADduction 4+   5     Internal Rotation 0° 4+   5     Internal Rotation 90° 4+   5     External Rotation 0° 4+   5     External Rotation 90° 4+   5                 Treatment:  Therapeutic Exercise  TE 1: 40 mins; Chin tucks, scapula retractions, scap elevations, front headlifts, open book R, R lateral head lifts, open book L, L lateral head lifts, prone head lifts, prone superman, prone W's, cat/camel, banded rows, banded ext, banded ER, banded IR, horizontal abd, bilateral ER, wall clock, serratus wall slides, NuStep  Manual Therapy  MT 1: 15 mins; PA's at thoracic spinous processes, DTM at medial scap and right upper trap, cervical  isometrics  Modalities  Electrical Stimulation (Parameters): 15 mins; IFC e-stim with moist heat post ex    Time Entry(in minutes): 70  E-Stim (Unattended) Time Entry: 15 (IFC e-stim with moist heat to the low back)  Manual Therapy Time Entry: 15  Therapeutic Exercise Time Entry: 40    Assessment & Plan   Assessment: Rinku continues with tenderness at T5-T7 thoracic spine with PA's. Continued with manual work that included rib mobilizations as well and it lossened things up  and he had less tenderness. Performance with all exercises was good.       Patient will continue to benefit from skilled outpatient physical therapy to address the deficits listed in the problem list box on initial evaluation, provide pt/family education and to maximize pt's level of independence in the home and community environment.     Patient's spiritual, cultural, and educational needs considered and patient agreeable to plan of care and goals.           Plan: Continue with the plan of care. Will advance as tolerated and per protocol.    Goals:   Active       LTG       Patient will report at least 20% disability reduction from initial NDI score to indicate clinically significant functional improvement   (Progressing)       Start:  03/16/25    Expected End:  06/06/25            Patient will report at least 30 point increase on initial FOTO score to indicate clinically significant functional improvement   (Progressing)       Start:  03/16/25    Expected End:  06/06/25            Patient will report 80% overall perceived improvement  (Progressing)       Start:  03/16/25    Expected End:  06/06/25            Patient will demonstrate independence and compliance with final HEP  (Progressing)       Start:  03/16/25    Expected End:  06/06/25               STG       Patient will report at least 10% disability reduction from initial NDI score to indicate clinically significant functional improvement   (Progressing)       Start:  03/16/25    Expected  End:  04/25/25            Patient will report at least 10 pt increase on initial FOTO score to indicate clinically significant functional improvement   (Progressing)       Start:  03/16/25    Expected End:  04/25/25            Patient will report 50% overall perceived improvement  (Progressing)       Start:  03/16/25    Expected End:  04/25/25            Patient will demonstrate independence and compliance with HEP  (Progressing)       Start:  03/16/25    Expected End:  04/25/25                Chandler Castellon, PT

## 2025-03-24 ENCOUNTER — CLINICAL SUPPORT (OUTPATIENT)
Dept: REHABILITATION | Facility: HOSPITAL | Age: 60
End: 2025-03-24
Payer: COMMERCIAL

## 2025-03-24 DIAGNOSIS — M54.2 NECK PAIN: Primary | ICD-10-CM

## 2025-03-24 DIAGNOSIS — Z98.1 S/P CERVICAL SPINAL FUSION: ICD-10-CM

## 2025-03-24 DIAGNOSIS — M48.02 SPINAL STENOSIS IN CERVICAL REGION: ICD-10-CM

## 2025-03-24 PROCEDURE — 97014 ELECTRIC STIMULATION THERAPY: CPT

## 2025-03-24 PROCEDURE — 97140 MANUAL THERAPY 1/> REGIONS: CPT

## 2025-03-24 PROCEDURE — 97110 THERAPEUTIC EXERCISES: CPT

## 2025-03-24 NOTE — PROGRESS NOTES
Outpatient Rehab    Physical Therapy Visit    Patient Name: Rinku Herzog  MRN: [id number]  YOB: 1965  Encounter Date: 3/24/2025    Therapy Diagnosis:   Encounter Diagnoses   Name Primary?    Neck pain Yes    S/P cervical spinal fusion     Spinal stenosis in cervical region      Physician: Omar Lira, *    Physician Orders: Eval and Treat  Medical Diagnosis: Spinal stenosis in cervical region     Visit # / Visits Authorized:  5/ 50  Date of Evaluation: 3/13/2025  Insurance Authorization Period: 3/12/2025 to 3/12/2026  Plan of Care Certification:  3/13/2025 to 6/6/2025      Time In: 0720   Time Out: 0830  Total Time: 70   Total Billable Time: 70    FOTO:  Therapist reviewed FOTO scores for Rinku Herzog on 3/13/2025.   FOTO report - see Media section or FOTO account episode details.      Intake: Patient's Physical FS Primary Measure: 22 (goal is 56 @ visit #14)  Intake: Risk Adjusted Statistical FOTO: 51  Intake: Limitation Score: 78%  Intake: Category: Carrying  Intake: NDI:  78.0% (higher score greater disability)      Intake Score: 22 (predicted 56 by visit #14)%       Subjective   Rinku feeling pretty well. Did a lot over the weekend that included yard work and boiling crawfish. Gets right neck and upper trap pain when stitting for extended periods. No pain as long as he is active..  Pain reported as 0/10. Just sore today but no pain.    Objective    Cervical Range of Motion    Active (deg) Passive (deg) Pain   Flexion 40  (avoided overpressure due to surgical restrictions)     Extension 35       Right Lateral Flexion 30  (avoided overpressure due to surgical restrictions)     Right Rotation 55  (avoided overpressure due to surgical restrictions)     Left Lateral Flexion 25  (avoided overpressure due to surgical restrictions)     Left Rotation 65  (avoided overpressure due to surgical restrictions)              Shoulder Range of Motion  Right Shoulder    Active (deg) Passive (deg) Pain    Flexion 180 180     Extension         Scaption         ABduction 170 180     ADduction         Horizontal ABduction         Horizontal ADduction         External Rotation (Shoulder ABducted 0 degrees)         External Rotation (Shoulder ABducted 45 degrees)         External Rotation (Shoulder ABducted 90 degrees) 85 90     Internal Rotation (Shoulder ABducted 0 degrees) 90 90     Internal Rotation (Shoulder ABducted 45 degrees)         Internal Rotation (Shoulder ABducted 90 degrees)            Left Shoulder    Active (deg) Passive (deg) Pain   Flexion 180 180     Extension         Scaption         ABduction 175 180     ADduction         Horizontal ABduction         Horizontal ADduction         External Rotation (Shoulder ABducted 0 degrees)         External Rotation (Shoulder ABducted 45 degrees)         External Rotation (Shoulder ABducted 90 degrees) 90 90     Internal Rotation (Shoulder ABducted 0 degrees) 90 90     Internal Rotation (Shoulder ABducted 45 degrees)         Internal Rotation (Shoulder ABducted 90 degrees)                  Shoulder Strength - Planes of Motion    Right Strength Right Pain Left Strength Left  Pain   Flexion 4+   5     Extension 4+   5     ABduction 4+   5     ADduction 4+   5     Horizontal ABduction 4+   5     Horizontal ADduction 4+   5     Internal Rotation 0° 4+   5     Internal Rotation 90° 4+   5     External Rotation 0° 4+   5     External Rotation 90° 4+   5                 Treatment:  Therapeutic Exercise  TE 1: 40 mins; Chin tucks, scapula retractions, scap elevations, front headlifts, open book R, R lateral head lifts, open book L, L lateral head lifts, prone head lifts, prone superman, prone W's, cat/camel, banded rows, banded ext, banded ER, banded IR, horizontal abd, bilateral ER, wall clock, serratus wall slides, NuStep  Manual Therapy  MT 1: 15 mins; PA's at thoracic spinous processes, DTM at medial scap and right upper trap, cervical  isometrics  Modalities  Electrical Stimulation (Parameters): 15 mins; IFC e-stim with moist heat post ex    Time Entry(in minutes): 70  E-Stim (Unattended) Time Entry: 15 (IFC e-stim with moist heat to the low back)  Manual Therapy Time Entry: 15  Therapeutic Exercise Time Entry: 40    Assessment & Plan   Assessment: Rinku continues with tenderness at T5-T7 thoracic spine with PA's but improving. Continued with manual work that included rib mobilizations and was beneficiaL. cervical ROM looks good. Performance with all exercises was good.  Evaluation/Treatment Tolerance: Patient tolerated treatment well    Patient will continue to benefit from skilled outpatient physical therapy to address the deficits listed in the problem list box on initial evaluation, provide pt/family education and to maximize pt's level of independence in the home and community environment.     Patient's spiritual, cultural, and educational needs considered and patient agreeable to plan of care and goals.           Plan: Continue with the plan of care. Will advance as tolerated and per protocol.    Goals:   Active       LTG       Patient will report at least 20% disability reduction from initial NDI score to indicate clinically significant functional improvement   (Progressing)       Start:  03/16/25    Expected End:  06/06/25            Patient will report at least 30 point increase on initial FOTO score to indicate clinically significant functional improvement   (Progressing)       Start:  03/16/25    Expected End:  06/06/25            Patient will report 80% overall perceived improvement  (Progressing)       Start:  03/16/25    Expected End:  06/06/25            Patient will demonstrate independence and compliance with final HEP  (Progressing)       Start:  03/16/25    Expected End:  06/06/25               STG       Patient will report at least 10% disability reduction from initial NDI score to indicate clinically significant functional  improvement   (Progressing)       Start:  03/16/25    Expected End:  04/25/25            Patient will report at least 10 pt increase on initial FOTO score to indicate clinically significant functional improvement   (Progressing)       Start:  03/16/25    Expected End:  04/25/25            Patient will report 50% overall perceived improvement  (Progressing)       Start:  03/16/25    Expected End:  04/25/25            Patient will demonstrate independence and compliance with HEP  (Progressing)       Start:  03/16/25    Expected End:  04/25/25                Chandler Castellon PT

## 2025-03-26 ENCOUNTER — CLINICAL SUPPORT (OUTPATIENT)
Dept: REHABILITATION | Facility: HOSPITAL | Age: 60
End: 2025-03-26
Payer: COMMERCIAL

## 2025-03-26 DIAGNOSIS — M54.2 NECK PAIN: Primary | ICD-10-CM

## 2025-03-26 DIAGNOSIS — M48.02 SPINAL STENOSIS IN CERVICAL REGION: ICD-10-CM

## 2025-03-26 DIAGNOSIS — Z98.1 S/P CERVICAL SPINAL FUSION: ICD-10-CM

## 2025-03-26 PROCEDURE — 97110 THERAPEUTIC EXERCISES: CPT

## 2025-03-26 PROCEDURE — 97140 MANUAL THERAPY 1/> REGIONS: CPT

## 2025-03-26 PROCEDURE — 97014 ELECTRIC STIMULATION THERAPY: CPT

## 2025-03-26 NOTE — PROGRESS NOTES
Outpatient Rehab    Physical Therapy Visit    Patient Name: Rinku Herzog  MRN: [id number]  YOB: 1965  Encounter Date: 3/26/2025    Therapy Diagnosis:   Encounter Diagnoses   Name Primary?    Neck pain Yes    S/P cervical spinal fusion     Spinal stenosis in cervical region      Physician: Omar Lira, *    Physician Orders: Eval and Treat  Medical Diagnosis: Spinal stenosis in cervical region     Visit # / Visits Authorized:  5/ 50  Date of Evaluation: 3/13/2025  Insurance Authorization Period: 3/12/2025 to 3/12/2026  Plan of Care Certification:  3/13/2025 to 6/6/2025      Time In: 0719   Time Out: 0829  Total Time: 70   Total Billable Time: 70    FOTO:  Therapist reviewed FOTO scores for Rinku Herzog on 3/13/2025.   FOTO report - see Media section or FOTO account episode details.      Intake: Patient's Physical FS Primary Measure: 22 (goal is 56 @ visit #14)  Intake: Risk Adjusted Statistical FOTO: 51  Intake: Limitation Score: 78%  Intake: Category: Carrying  Intake: NDI:  78.0% (higher score greater disability)      Intake Score: 22 (predicted 56 by visit #14)%       Subjective   Rinku continues to do pretty well. Right neck and upper trap pain still an issue when stitting for extended periods. No pain as long as he is active..  Pain reported as 0/10. Just sore today but no pain.    Objective    Cervical Range of Motion    Active (deg) Passive (deg) Pain   Flexion 40  (avoided overpressure due to surgical restrictions)     Extension 35       Right Lateral Flexion 30  (avoided overpressure due to surgical restrictions)     Right Rotation 55  (avoided overpressure due to surgical restrictions)     Left Lateral Flexion 25  (avoided overpressure due to surgical restrictions)     Left Rotation 65  (avoided overpressure due to surgical restrictions)              Shoulder Range of Motion  Right Shoulder    Active (deg) Passive (deg) Pain   Flexion 180 180     Extension         Scaption          ABduction 170 180     ADduction         Horizontal ABduction         Horizontal ADduction         External Rotation (Shoulder ABducted 0 degrees)         External Rotation (Shoulder ABducted 45 degrees)         External Rotation (Shoulder ABducted 90 degrees) 85 90     Internal Rotation (Shoulder ABducted 0 degrees) 90 90     Internal Rotation (Shoulder ABducted 45 degrees)         Internal Rotation (Shoulder ABducted 90 degrees)            Left Shoulder    Active (deg) Passive (deg) Pain   Flexion 180 180     Extension         Scaption         ABduction 175 180     ADduction         Horizontal ABduction         Horizontal ADduction         External Rotation (Shoulder ABducted 0 degrees)         External Rotation (Shoulder ABducted 45 degrees)         External Rotation (Shoulder ABducted 90 degrees) 90 90     Internal Rotation (Shoulder ABducted 0 degrees) 90 90     Internal Rotation (Shoulder ABducted 45 degrees)         Internal Rotation (Shoulder ABducted 90 degrees)                  Shoulder Strength - Planes of Motion    Right Strength Right Pain Left Strength Left  Pain   Flexion 4+   5     Extension 4+   5     ABduction 4+   5     ADduction 4+   5     Horizontal ABduction 4+   5     Horizontal ADduction 4+   5     Internal Rotation 0° 4+   5     Internal Rotation 90° 4+   5     External Rotation 0° 4+   5     External Rotation 90° 4+   5                 Treatment:  Therapeutic Exercise  TE 1: 40 mins; Chin tucks, scapula retractions, scap elevations, front headlifts, open book R, R lateral head lifts, open book L, L lateral head lifts, prone head lifts, prone superman, prone W's, cat/camel, banded rows, banded ext, banded ER, banded IR, horizontal abd, bilateral ER, wall clock, serratus wall slides, NuStep  Manual Therapy  MT 1: 15 mins; PA's at thoracic spinous processes, DTM at medial scap and right upper trap, cervical isometrics  Modalities  Electrical Stimulation (Parameters): 15 mins; IFC e-stim with  moist heat post ex    Time Entry(in minutes): 70  E-Stim (Unattended) Time Entry: 15 (IFC e-stim with moist heat to the low back)  Manual Therapy Time Entry: 15  Therapeutic Exercise Time Entry: 40    Assessment & Plan   Assessment: Rinku with less tenderness at T5-T7 thoracic spine with improved spinal mobility with PA's. Continued with manual work that included rib mobilizations. Cervical ROM looks good. Performance with all exercises was good.  Evaluation/Treatment Tolerance: Patient tolerated treatment well    Patient will continue to benefit from skilled outpatient physical therapy to address the deficits listed in the problem list box on initial evaluation, provide pt/family education and to maximize pt's level of independence in the home and community environment.     Patient's spiritual, cultural, and educational needs considered and patient agreeable to plan of care and goals.           Plan: Continue with the plan of care. Will advance as tolerated and per protocol.    Goals:   Active       LTG       Patient will report at least 20% disability reduction from initial NDI score to indicate clinically significant functional improvement   (Progressing)       Start:  03/16/25    Expected End:  06/06/25            Patient will report at least 30 point increase on initial FOTO score to indicate clinically significant functional improvement   (Progressing)       Start:  03/16/25    Expected End:  06/06/25            Patient will report 80% overall perceived improvement  (Progressing)       Start:  03/16/25    Expected End:  06/06/25            Patient will demonstrate independence and compliance with final HEP  (Progressing)       Start:  03/16/25    Expected End:  06/06/25               STG       Patient will report at least 10% disability reduction from initial NDI score to indicate clinically significant functional improvement   (Progressing)       Start:  03/16/25    Expected End:  04/25/25            Patient  will report at least 10 pt increase on initial FOTO score to indicate clinically significant functional improvement   (Progressing)       Start:  03/16/25    Expected End:  04/25/25            Patient will report 50% overall perceived improvement  (Progressing)       Start:  03/16/25    Expected End:  04/25/25            Patient will demonstrate independence and compliance with HEP  (Progressing)       Start:  03/16/25    Expected End:  04/25/25                Chandler Castellon, PT

## 2025-03-28 ENCOUNTER — CLINICAL SUPPORT (OUTPATIENT)
Dept: REHABILITATION | Facility: HOSPITAL | Age: 60
End: 2025-03-28
Payer: COMMERCIAL

## 2025-03-28 DIAGNOSIS — M54.2 NECK PAIN: Primary | ICD-10-CM

## 2025-03-28 DIAGNOSIS — M48.02 SPINAL STENOSIS IN CERVICAL REGION: ICD-10-CM

## 2025-03-28 DIAGNOSIS — Z98.1 S/P CERVICAL SPINAL FUSION: ICD-10-CM

## 2025-03-28 PROCEDURE — 97110 THERAPEUTIC EXERCISES: CPT

## 2025-03-28 PROCEDURE — 97140 MANUAL THERAPY 1/> REGIONS: CPT

## 2025-03-28 PROCEDURE — 97014 ELECTRIC STIMULATION THERAPY: CPT

## 2025-03-28 NOTE — PROGRESS NOTES
Outpatient Rehab    Physical Therapy Visit    Patient Name: Rinku Hrezog  MRN: [id number]  YOB: 1965  Encounter Date: 3/28/2025    Therapy Diagnosis:   Encounter Diagnoses   Name Primary?    Neck pain Yes    S/P cervical spinal fusion     Spinal stenosis in cervical region      Physician: Omar Lira, *    Physician Orders: Eval and Treat  Medical Diagnosis: Spinal stenosis in cervical region     Visit # / Visits Authorized:  6/ 50  Date of Evaluation: 3/13/2025  Insurance Authorization Period: 3/12/2025 to 3/12/2026  Plan of Care Certification:  3/13/2025 to 6/6/2025      Time In: 0710   Time Out: 0820  Total Time: 70   Total Billable Time: 70    FOTO:  Therapist reviewed FOTO scores for Rinku Herzog on 3/13/2025.   FOTO report - see Media section or FOTO account episode details.      Intake: Patient's Physical FS Primary Measure: 22 (goal is 56 @ visit #14)  Intake: Risk Adjusted Statistical FOTO: 51  Intake: Limitation Score: 78%  Intake: Category: Carrying  Intake: NDI:  78.0% (higher score greater disability)      Intake Score: 22 (predicted 56 by visit #14)%       Subjective   Rinku is feeling better and feels he has made some really good progress. There is still right neck and upper trap pain with prolong stitting. No pain as long as he is active..  Pain reported as 0/10. Just sore today but no pain.    Objective    Cervical Range of Motion    Active (deg) Passive (deg) Pain   Flexion 40  (avoided overpressure due to surgical restrictions)     Extension 35       Right Lateral Flexion 30  (avoided overpressure due to surgical restrictions)     Right Rotation 55  (avoided overpressure due to surgical restrictions)     Left Lateral Flexion 25  (avoided overpressure due to surgical restrictions)     Left Rotation 65  (avoided overpressure due to surgical restrictions)              Shoulder Range of Motion  Right Shoulder    Active (deg) Passive (deg) Pain   Flexion 180 180     Extension          Scaption         ABduction 170 180     ADduction         Horizontal ABduction         Horizontal ADduction         External Rotation (Shoulder ABducted 0 degrees)         External Rotation (Shoulder ABducted 45 degrees)         External Rotation (Shoulder ABducted 90 degrees) 85 90     Internal Rotation (Shoulder ABducted 0 degrees) 90 90     Internal Rotation (Shoulder ABducted 45 degrees)         Internal Rotation (Shoulder ABducted 90 degrees)            Left Shoulder    Active (deg) Passive (deg) Pain   Flexion 180 180     Extension         Scaption         ABduction 175 180     ADduction         Horizontal ABduction         Horizontal ADduction         External Rotation (Shoulder ABducted 0 degrees)         External Rotation (Shoulder ABducted 45 degrees)         External Rotation (Shoulder ABducted 90 degrees) 90 90     Internal Rotation (Shoulder ABducted 0 degrees) 90 90     Internal Rotation (Shoulder ABducted 45 degrees)         Internal Rotation (Shoulder ABducted 90 degrees)                  Shoulder Strength - Planes of Motion    Right Strength Right Pain Left Strength Left  Pain   Flexion 4+   5     Extension 4+   5     ABduction 4+   5     ADduction 4+   5     Horizontal ABduction 4+   5     Horizontal ADduction 4+   5     Internal Rotation 0° 4+   5     Internal Rotation 90° 4+   5     External Rotation 0° 4+   5     External Rotation 90° 4+   5                 Treatment:  Therapeutic Exercise  TE 1: 40 mins; Chin tucks, scapula retractions, scap elevations, front headlifts, open book R, R lateral head lifts, open book L, L lateral head lifts, prone head lifts, prone superman, prone W's, cat/camel, banded rows, banded ext, banded ER, banded IR, horizontal abd, bilateral ER, wall clock, serratus wall slides, NuStep  Manual Therapy  MT 1: 15 mins; PA's at thoracic spinous processes, DTM at medial scap and right upper trap, cervical isometrics  Modalities  Electrical Stimulation (Parameters): 15  mins; IFC e-stim with moist heat post ex    Time Entry(in minutes): 70  E-Stim (Unattended) Time Entry: 15 (IFC e-stim with moist heat to the low back)  Manual Therapy Time Entry: 15  Therapeutic Exercise Time Entry: 40    Assessment & Plan   Assessment: Rinku has made really good progress and this is evident by his improved score on the functuional outcome tool. He has gone from 78% limitation to 46%. There was with less tenderness at T5-T7 thoracic spine with improved spinal mobility with PA's. Continued with rib mobilizations. Cervical ROM looks good. Performance with all exercises was good.  Evaluation/Treatment Tolerance: Patient tolerated treatment well    Patient will continue to benefit from skilled outpatient physical therapy to address the deficits listed in the problem list box on initial evaluation, provide pt/family education and to maximize pt's level of independence in the home and community environment.     Patient's spiritual, cultural, and educational needs considered and patient agreeable to plan of care and goals.           Plan: Continue with the plan of care. Will advance as tolerated and per protocol.    Goals:   Active       LTG       Patient will report at least 20% disability reduction from initial NDI score to indicate clinically significant functional improvement   (Progressing)       Start:  03/16/25    Expected End:  06/06/25            Patient will report at least 30 point increase on initial FOTO score to indicate clinically significant functional improvement   (Progressing)       Start:  03/16/25    Expected End:  06/06/25            Patient will report 80% overall perceived improvement  (Progressing)       Start:  03/16/25    Expected End:  06/06/25            Patient will demonstrate independence and compliance with final HEP  (Progressing)       Start:  03/16/25    Expected End:  06/06/25               STG       Patient will report at least 10% disability reduction from initial NDI  score to indicate clinically significant functional improvement   (Progressing)       Start:  03/16/25    Expected End:  04/25/25            Patient will report at least 10 pt increase on initial FOTO score to indicate clinically significant functional improvement   (Progressing)       Start:  03/16/25    Expected End:  04/25/25            Patient will report 50% overall perceived improvement  (Progressing)       Start:  03/16/25    Expected End:  04/25/25            Patient will demonstrate independence and compliance with HEP  (Progressing)       Start:  03/16/25    Expected End:  04/25/25                Chandler Castellon PT

## 2025-03-31 ENCOUNTER — CLINICAL SUPPORT (OUTPATIENT)
Dept: REHABILITATION | Facility: HOSPITAL | Age: 60
End: 2025-03-31
Payer: COMMERCIAL

## 2025-03-31 DIAGNOSIS — M48.02 SPINAL STENOSIS IN CERVICAL REGION: ICD-10-CM

## 2025-03-31 DIAGNOSIS — Z98.1 S/P CERVICAL SPINAL FUSION: ICD-10-CM

## 2025-03-31 DIAGNOSIS — M54.2 NECK PAIN: Primary | ICD-10-CM

## 2025-03-31 PROCEDURE — 97014 ELECTRIC STIMULATION THERAPY: CPT

## 2025-03-31 PROCEDURE — 97140 MANUAL THERAPY 1/> REGIONS: CPT

## 2025-03-31 PROCEDURE — 97110 THERAPEUTIC EXERCISES: CPT

## 2025-03-31 NOTE — PROGRESS NOTES
"  Outpatient Rehab    Physical Therapy Visit    Patient Name: Rinku Herzog  MRN: [id number]  YOB: 1965  Encounter Date: 3/31/2025    Therapy Diagnosis:   Encounter Diagnoses   Name Primary?    Neck pain Yes    S/P cervical spinal fusion     Spinal stenosis in cervical region      Physician: Omar Lira, *    Physician Orders: Eval and Treat  Medical Diagnosis: Spinal stenosis in cervical region     Visit # / Visits Authorized:  6/ 50  Date of Evaluation: 3/13/2025  Insurance Authorization Period: 3/12/2025 to 3/12/2026  Plan of Care Certification:  3/13/2025 to 6/6/2025      Time In: 0720   Time Out: 0830  Total Time: 70   Total Billable Time: 70    FOTO:  Therapist reviewed FOTO scores for Rinku Herzog on 3/13/2025.   FOTO report - see Media section or FOTO account episode details.      Intake: Patient's Physical FS Primary Measure: 22 (goal is 56 @ visit #14)  Intake: Risk Adjusted Statistical FOTO: 51  Intake: Limitation Score: 78%  Intake: Category: Carrying  Intake: NDI:  78.0% (higher score greater disability)      Intake Score: 22 (predicted 56 by visit #14)%       Subjective   Rinku is feeling pretty good but is having some soreness "from the exercises." There is still right neck and upper trap pain with prolong stitting onoccasions and is less often now. Sat, ate and peeled crawfish and had no issues yesterday..  Pain reported as 0/10. Just sore today but no pain.    Objective    Cervical Range of Motion    Active (deg) Passive (deg) Pain   Flexion 40  (avoided overpressure due to surgical restrictions)     Extension 35       Right Lateral Flexion 30  (avoided overpressure due to surgical restrictions)     Right Rotation 55  (avoided overpressure due to surgical restrictions)     Left Lateral Flexion 25  (avoided overpressure due to surgical restrictions)     Left Rotation 65  (avoided overpressure due to surgical restrictions)              Shoulder Range of Motion  Right Shoulder   "  Active (deg) Passive (deg) Pain   Flexion 180 180     Extension         Scaption         ABduction 170 180     ADduction         Horizontal ABduction         Horizontal ADduction         External Rotation (Shoulder ABducted 0 degrees)         External Rotation (Shoulder ABducted 45 degrees)         External Rotation (Shoulder ABducted 90 degrees) 85 90     Internal Rotation (Shoulder ABducted 0 degrees) 90 90     Internal Rotation (Shoulder ABducted 45 degrees)         Internal Rotation (Shoulder ABducted 90 degrees)            Left Shoulder    Active (deg) Passive (deg) Pain   Flexion 180 180     Extension         Scaption         ABduction 175 180     ADduction         Horizontal ABduction         Horizontal ADduction         External Rotation (Shoulder ABducted 0 degrees)         External Rotation (Shoulder ABducted 45 degrees)         External Rotation (Shoulder ABducted 90 degrees) 90 90     Internal Rotation (Shoulder ABducted 0 degrees) 90 90     Internal Rotation (Shoulder ABducted 45 degrees)         Internal Rotation (Shoulder ABducted 90 degrees)                  Shoulder Strength - Planes of Motion    Right Strength Right Pain Left Strength Left  Pain   Flexion 4+   5     Extension 4+   5     ABduction 4+   5     ADduction 4+   5     Horizontal ABduction 4+   5     Horizontal ADduction 4+   5     Internal Rotation 0° 4+   5     Internal Rotation 90° 4+   5     External Rotation 0° 4+   5     External Rotation 90° 4+   5                 Treatment:  Therapeutic Exercise  TE 1: 40 mins; Chin tucks, scapula retractions, scap elevations, front headlifts, open book R, R lateral head lifts, open book L, L lateral head lifts, prone head lifts, prone superman, prone W's, cat/camel, banded rows, banded ext, banded ER, banded IR, horizontal abd, bilateral ER, wall clock, serratus wall slides, NuStep  Manual Therapy  MT 1: 15 mins; PA's at thoracic spinous processes, DTM at medial scap and right upper trap,  cervical isometrics  Modalities  Electrical Stimulation (Parameters): 15 mins; IFC e-stim with moist heat post ex    Time Entry(in minutes): 70  E-Stim (Unattended) Time Entry: 15 (IFC e-stim with moist heat to the low back)  Manual Therapy Time Entry: 15  Therapeutic Exercise Time Entry: 40    Assessment & Plan   Assessment: Rinku has made really good progress and this is evident by his improved score on the functuional outcome tool. He has gone from 78% limitation to 46%. There continues to be less tenderness at T5-T7 thoracic spine with central and lateral PA's. Improved spinal mobility with PA's. Continued with rib mobilizations. Cervical ROM looks good. Performance with all exercises was good.  Evaluation/Treatment Tolerance: Patient tolerated treatment well    Patient will continue to benefit from skilled outpatient physical therapy to address the deficits listed in the problem list box on initial evaluation, provide pt/family education and to maximize pt's level of independence in the home and community environment.     Patient's spiritual, cultural, and educational needs considered and patient agreeable to plan of care and goals.           Plan: Continue with the plan of care. Will advance as tolerated and per protocol.    Goals:   Active       LTG       Patient will report at least 20% disability reduction from initial NDI score to indicate clinically significant functional improvement   (Progressing)       Start:  03/16/25    Expected End:  06/06/25            Patient will report at least 30 point increase on initial FOTO score to indicate clinically significant functional improvement   (Progressing)       Start:  03/16/25    Expected End:  06/06/25            Patient will report 80% overall perceived improvement  (Progressing)       Start:  03/16/25    Expected End:  06/06/25            Patient will demonstrate independence and compliance with final HEP  (Progressing)       Start:  03/16/25    Expected  End:  06/06/25              Resolved       STG       Patient will report at least 10% disability reduction from initial NDI score to indicate clinically significant functional improvement   (Met)       Start:  03/16/25    Expected End:  04/25/25    Resolved:  03/31/25         Patient will report at least 10 pt increase on initial FOTO score to indicate clinically significant functional improvement   (Met)       Start:  03/16/25    Expected End:  04/25/25    Resolved:  03/31/25         Patient will report 50% overall perceived improvement  (Met)       Start:  03/16/25    Expected End:  04/25/25    Resolved:  03/31/25         Patient will demonstrate independence and compliance with HEP  (Met)       Start:  03/16/25    Expected End:  04/25/25    Resolved:  03/31/25             Chandler Castellon PT

## 2025-04-02 ENCOUNTER — CLINICAL SUPPORT (OUTPATIENT)
Dept: REHABILITATION | Facility: HOSPITAL | Age: 60
End: 2025-04-02
Payer: COMMERCIAL

## 2025-04-02 DIAGNOSIS — M48.02 SPINAL STENOSIS IN CERVICAL REGION: ICD-10-CM

## 2025-04-02 DIAGNOSIS — Z98.1 S/P CERVICAL SPINAL FUSION: ICD-10-CM

## 2025-04-02 DIAGNOSIS — M54.2 NECK PAIN: Primary | ICD-10-CM

## 2025-04-02 PROCEDURE — 97014 ELECTRIC STIMULATION THERAPY: CPT

## 2025-04-02 PROCEDURE — 97140 MANUAL THERAPY 1/> REGIONS: CPT

## 2025-04-02 PROCEDURE — 97110 THERAPEUTIC EXERCISES: CPT

## 2025-04-02 NOTE — PROGRESS NOTES
Outpatient Rehab    Physical Therapy Visit    Patient Name: Rinku Herzog  MRN: [id number]  YOB: 1965  Encounter Date: 4/2/2025    Therapy Diagnosis:   Encounter Diagnoses   Name Primary?    Neck pain Yes    S/P cervical spinal fusion     Spinal stenosis in cervical region      Physician: Omar Lira, *    Physician Orders: Eval and Treat  Medical Diagnosis: Spinal stenosis in cervical region     Visit # / Visits Authorized:  8/ 50  Date of Evaluation: 3/13/2025  Insurance Authorization Period: 3/12/2025 to 3/12/2026  Plan of Care Certification:  3/13/2025 to 6/6/2025      Time In: 0715   Time Out: 0825  Total Time: 70   Total Billable Time: 70    FOTO:  Therapist reviewed FOTO scores for Rinku Herzog on 3/13/2025.   FOTO report - see Media section or FOTO account episode details.      Intake: Patient's Physical FS Primary Measure: 22 (goal is 56 @ visit #14)  Intake: Risk Adjusted Statistical FOTO: 51  Intake: Limitation Score: 78%  Intake: Category: Carrying  Intake: NDI:  78.0% (higher score greater disability)     3/28/2025: Patient's Physical FS Primary Measure: 54 (goal is 56 @ visit #14)  Intake: Risk Adjusted Statistical FOTO: 51  3/28/2025: Limitation Score: 46%  3/28/2025: Category: Carrying  3/28/2025: NDI:  30.0% (higher score greater disability)      Intake Score: 22 (predicted 56 by visit #14)%  Survey Score 2: 54 (predicted 56 by visit #14)%       Subjective   Rinku is feeling pretty good but is having some soreness with some stiffness right neck area. Occasional right neck and upper trap pain with prolong stitting and using his arms but is less often now..  Pain reported as 0/10. Just sore today but no pain.    Objective    Cervical Range of Motion    Active (deg) Passive (deg) Pain   Flexion 40  (avoided overpressure due to surgical restrictions)     Extension 35       Right Lateral Flexion 30  (avoided overpressure due to surgical restrictions)     Right Rotation 55   (avoided overpressure due to surgical restrictions)     Left Lateral Flexion 25  (avoided overpressure due to surgical restrictions)     Left Rotation 65  (avoided overpressure due to surgical restrictions)              Shoulder Range of Motion  Right Shoulder    Active (deg) Passive (deg) Pain   Flexion 180 180     Extension         Scaption         ABduction 170 180     ADduction         Horizontal ABduction         Horizontal ADduction         External Rotation (Shoulder ABducted 0 degrees)         External Rotation (Shoulder ABducted 45 degrees)         External Rotation (Shoulder ABducted 90 degrees) 85 90     Internal Rotation (Shoulder ABducted 0 degrees) 90 90     Internal Rotation (Shoulder ABducted 45 degrees)         Internal Rotation (Shoulder ABducted 90 degrees)            Left Shoulder    Active (deg) Passive (deg) Pain   Flexion 180 180     Extension         Scaption         ABduction 175 180     ADduction         Horizontal ABduction         Horizontal ADduction         External Rotation (Shoulder ABducted 0 degrees)         External Rotation (Shoulder ABducted 45 degrees)         External Rotation (Shoulder ABducted 90 degrees) 90 90     Internal Rotation (Shoulder ABducted 0 degrees) 90 90     Internal Rotation (Shoulder ABducted 45 degrees)         Internal Rotation (Shoulder ABducted 90 degrees)                  Shoulder Strength - Planes of Motion    Right Strength Right Pain Left Strength Left  Pain   Flexion 4+   5     Extension 4+   5     ABduction 4+   5     ADduction 4+   5     Horizontal ABduction 4+   5     Horizontal ADduction 4+   5     Internal Rotation 0° 4+   5     Internal Rotation 90° 4+   5     External Rotation 0° 4+   5     External Rotation 90° 4+   5                 Treatment:  Therapeutic Exercise  TE 1: 40 mins; Chin tucks, scapula retractions, scap elevations, front headlifts, open book R, R lateral head lifts, open book L, L lateral head lifts, prone head lifts, prone  superman, prone W's, cat/camel, banded rows, banded ext, banded ER, banded IR, horizontal abd, bilateral ER, wall clock, serratus wall slides, NuStep  Manual Therapy  MT 1: 15 mins; PA's at thoracic spinous processes, DTM at medial scap and right upper trap, cervical isometrics; gentle passive neck stretch  Modalities  Electrical Stimulation (Parameters): 15 mins; IFC e-stim with moist heat post ex    Time Entry(in minutes): 70  E-Stim (Unattended) Time Entry: 15 (IFC e-stim with moist heat to the low back)  Manual Therapy Time Entry: 15  Therapeutic Exercise Time Entry: 40    Assessment & Plan   Assessment: Rinku has made really good progress and this is evident by his improved score on the functuional outcome tool. He has gone from 78% limitation to 46%. There continues to be tenderness at T5-T7 thoracic spine with central and lateral PA's. There is improved spinal mobility with PA's. Continued with rib mobilizations. Neck stiffness today with left lat flexion and right rotationso we did some gentle stretching Cervical ROM looks good. Performance with all exercises was good.  Evaluation/Treatment Tolerance: Patient tolerated treatment well    Patient will continue to benefit from skilled outpatient physical therapy to address the deficits listed in the problem list box on initial evaluation, provide pt/family education and to maximize pt's level of independence in the home and community environment.     Patient's spiritual, cultural, and educational needs considered and patient agreeable to plan of care and goals.           Plan: Continue with the plan of care. Will advance as tolerated and per protocol.    Goals:   Active       LTG       Patient will report at least 20% disability reduction from initial NDI score to indicate clinically significant functional improvement   (Progressing)       Start:  03/16/25    Expected End:  06/06/25            Patient will report at least 30 point increase on initial FOTO score  to indicate clinically significant functional improvement   (Progressing)       Start:  03/16/25    Expected End:  06/06/25            Patient will report 80% overall perceived improvement  (Progressing)       Start:  03/16/25    Expected End:  06/06/25            Patient will demonstrate independence and compliance with final HEP  (Progressing)       Start:  03/16/25    Expected End:  06/06/25              Resolved       STG       Patient will report at least 10% disability reduction from initial NDI score to indicate clinically significant functional improvement   (Met)       Start:  03/16/25    Expected End:  04/25/25    Resolved:  03/31/25         Patient will report at least 10 pt increase on initial FOTO score to indicate clinically significant functional improvement   (Met)       Start:  03/16/25    Expected End:  04/25/25    Resolved:  03/31/25         Patient will report 50% overall perceived improvement  (Met)       Start:  03/16/25    Expected End:  04/25/25    Resolved:  03/31/25         Patient will demonstrate independence and compliance with HEP  (Met)       Start:  03/16/25    Expected End:  04/25/25    Resolved:  03/31/25             Chandler Castellon PT

## 2025-04-04 ENCOUNTER — CLINICAL SUPPORT (OUTPATIENT)
Dept: REHABILITATION | Facility: HOSPITAL | Age: 60
End: 2025-04-04
Payer: COMMERCIAL

## 2025-04-04 DIAGNOSIS — Z98.1 S/P CERVICAL SPINAL FUSION: ICD-10-CM

## 2025-04-04 DIAGNOSIS — M48.02 SPINAL STENOSIS IN CERVICAL REGION: ICD-10-CM

## 2025-04-04 DIAGNOSIS — M54.2 NECK PAIN: Primary | ICD-10-CM

## 2025-04-04 PROCEDURE — 97110 THERAPEUTIC EXERCISES: CPT

## 2025-04-04 PROCEDURE — 97140 MANUAL THERAPY 1/> REGIONS: CPT

## 2025-04-04 PROCEDURE — 97014 ELECTRIC STIMULATION THERAPY: CPT

## 2025-04-04 NOTE — PROGRESS NOTES
"  Outpatient Rehab    Physical Therapy Visit    Patient Name: Rinku Herzog  MRN: [id number]  YOB: 1965  Encounter Date: 4/4/2025    Therapy Diagnosis:   Encounter Diagnoses   Name Primary?    Neck pain Yes    S/P cervical spinal fusion     Spinal stenosis in cervical region      Physician: Omar Lira, *    Physician Orders: Eval and Treat  Medical Diagnosis: Spinal stenosis in cervical region     Visit # / Visits Authorized:  9/ 50  Date of Evaluation: 3/13/2025  Insurance Authorization Period: 3/12/2025 to 3/12/2026  Plan of Care Certification:  3/13/2025 to 6/6/2025      Time In: 0710   Time Out: 0820  Total Time: 70   Total Billable Time: 70    FOTO:  Therapist reviewed FOTO scores for Rinku Herzog on 3/13/2025.   FOTO report - see Media section or FOTO account episode details.      Intake: Patient's Physical FS Primary Measure: 22 (goal is 56 @ visit #14)  Intake: Risk Adjusted Statistical FOTO: 51  Intake: Limitation Score: 78%  Intake: Category: Carrying  Intake: NDI:  78.0% (higher score greater disability)     3/28/2025: Patient's Physical FS Primary Measure: 54 (goal is 56 @ visit #14)  Intake: Risk Adjusted Statistical FOTO: 51  3/28/2025: Limitation Score: 46%  3/28/2025: Category: Carrying  3/28/2025: NDI:  30.0% (higher score greater disability)      Intake Score: 22 (predicted 56 by visit #14)%  Survey Score 2: 54 (predicted 56 by visit #14)%       Subjective   Rinku continues with soreness and some stiffness right neck area that "won't let go." Remains with right neck and upper trap pain with prolong stitting while using his arms. Scheduled to see his surgeon on Monday..  Pain reported as 1/10. Just sore today but no pain.    Objective    Cervical Range of Motion    Active (deg) Passive (deg) Pain   Flexion 40  (avoided overpressure due to surgical restrictions)     Extension 35       Right Lateral Flexion 30  (avoided overpressure due to surgical restrictions)     Right " Rotation 55  (avoided overpressure due to surgical restrictions)     Left Lateral Flexion 25  (avoided overpressure due to surgical restrictions)     Left Rotation 65  (avoided overpressure due to surgical restrictions)              Shoulder Range of Motion  Right Shoulder    Active (deg) Passive (deg) Pain   Flexion 180 180     Extension         Scaption         ABduction 170 180     ADduction         Horizontal ABduction         Horizontal ADduction         External Rotation (Shoulder ABducted 0 degrees)         External Rotation (Shoulder ABducted 45 degrees)         External Rotation (Shoulder ABducted 90 degrees) 85 90     Internal Rotation (Shoulder ABducted 0 degrees) 90 90     Internal Rotation (Shoulder ABducted 45 degrees)         Internal Rotation (Shoulder ABducted 90 degrees)            Left Shoulder    Active (deg) Passive (deg) Pain   Flexion 180 180     Extension         Scaption         ABduction 175 180     ADduction         Horizontal ABduction         Horizontal ADduction         External Rotation (Shoulder ABducted 0 degrees)         External Rotation (Shoulder ABducted 45 degrees)         External Rotation (Shoulder ABducted 90 degrees) 90 90     Internal Rotation (Shoulder ABducted 0 degrees) 90 90     Internal Rotation (Shoulder ABducted 45 degrees)         Internal Rotation (Shoulder ABducted 90 degrees)                  Shoulder Strength - Planes of Motion    Right Strength Right Pain Left Strength Left  Pain   Flexion 4+   5     Extension 4+   5     ABduction 4+   5     ADduction 4+   5     Horizontal ABduction 4+   5     Horizontal ADduction 4+   5     Internal Rotation 0° 4+   5     Internal Rotation 90° 4+   5     External Rotation 0° 4+   5     External Rotation 90° 4+   5                 Treatment:  Therapeutic Exercise  TE 1: 40 mins; Chin tucks, scapula retractions, scap elevations, front headlifts, open book R, R lateral head lifts, open book L, L lateral head lifts, prone head  lifts, prone superman, prone W's, cat/camel, banded rows, banded ext, banded ER, banded IR, horizontal abd, bilateral ER, wall clock, serratus wall slides, NuStep  Manual Therapy  MT 1: 15 mins; PA's at thoracic spinous processes, DTM at medial scap and right upper trap, cervical isometrics; gentle passive neck stretch, manual traction/distraction  Modalities  Electrical Stimulation (Parameters): 15 mins; IFC e-stim with moist heat post ex    Time Entry(in minutes): 70  E-Stim (Unattended) Time Entry: 15 (IFC e-stim with moist heat to the low back)  Manual Therapy Time Entry: 15  Therapeutic Exercise Time Entry: 40    Assessment & Plan   Assessment: Rinku has made really good progress and this is evident by his improved score on the functuional outcome tool. He has gone from 78% limitation to 46%. There continues to be tenderness at T5-T7 thoracic spine with central and lateral PA's. There is improved spinal mobility with PA's.There continues to be right sided neck and upper trap pain/sorenss especially with prolong sitting while using the arms. Continued with rib mobilizations. Cervical ROM looks good but there is limitation with right rotation. Performance with all exercises was good.  Evaluation/Treatment Tolerance: Patient tolerated treatment well    Patient will continue to benefit from skilled outpatient physical therapy to address the deficits listed in the problem list box on initial evaluation, provide pt/family education and to maximize pt's level of independence in the home and community environment.     Patient's spiritual, cultural, and educational needs considered and patient agreeable to plan of care and goals.           Plan: Continue with the plan of care. Will advance as tolerated and per protocol.    Goals:   Active       LTG       Patient will report at least 20% disability reduction from initial NDI score to indicate clinically significant functional improvement   (Progressing)       Start:   03/16/25    Expected End:  06/06/25            Patient will report at least 30 point increase on initial FOTO score to indicate clinically significant functional improvement   (Progressing)       Start:  03/16/25    Expected End:  06/06/25            Patient will report 80% overall perceived improvement  (Progressing)       Start:  03/16/25    Expected End:  06/06/25            Patient will demonstrate independence and compliance with final HEP  (Progressing)       Start:  03/16/25    Expected End:  06/06/25              Resolved       STG       Patient will report at least 10% disability reduction from initial NDI score to indicate clinically significant functional improvement   (Met)       Start:  03/16/25    Expected End:  04/25/25    Resolved:  03/31/25         Patient will report at least 10 pt increase on initial FOTO score to indicate clinically significant functional improvement   (Met)       Start:  03/16/25    Expected End:  04/25/25    Resolved:  03/31/25         Patient will report 50% overall perceived improvement  (Met)       Start:  03/16/25    Expected End:  04/25/25    Resolved:  03/31/25         Patient will demonstrate independence and compliance with HEP  (Met)       Start:  03/16/25    Expected End:  04/25/25    Resolved:  03/31/25             Chandler Castellon, PT

## 2025-04-07 ENCOUNTER — CLINICAL SUPPORT (OUTPATIENT)
Dept: REHABILITATION | Facility: HOSPITAL | Age: 60
End: 2025-04-07
Payer: COMMERCIAL

## 2025-04-07 DIAGNOSIS — Z98.1 S/P CERVICAL SPINAL FUSION: ICD-10-CM

## 2025-04-07 DIAGNOSIS — M54.2 NECK PAIN: Primary | ICD-10-CM

## 2025-04-07 DIAGNOSIS — M48.02 SPINAL STENOSIS IN CERVICAL REGION: ICD-10-CM

## 2025-04-07 PROCEDURE — 97014 ELECTRIC STIMULATION THERAPY: CPT

## 2025-04-07 PROCEDURE — 97110 THERAPEUTIC EXERCISES: CPT

## 2025-04-07 PROCEDURE — 97140 MANUAL THERAPY 1/> REGIONS: CPT

## 2025-04-07 NOTE — PROGRESS NOTES
Outpatient Rehab    Physical Therapy Visit    Patient Name: Rinku Herzog  MRN: [id number]  YOB: 1965  Encounter Date: 4/7/2025    Therapy Diagnosis:   Encounter Diagnoses   Name Primary?    Neck pain Yes    S/P cervical spinal fusion     Spinal stenosis in cervical region      Physician: Omar Lira, *    Physician Orders: Eval and Treat  Medical Diagnosis: Spinal stenosis in cervical region     Visit # / Visits Authorized:  10/ 50  Date of Evaluation: 3/13/2025  Insurance Authorization Period: 3/12/2025 to 3/12/2026  Plan of Care Certification:  3/13/2025 to 6/6/2025      Time In: 0705   Time Out: 0815  Total Time: 70   Total Billable Time: 70    FOTO:  Therapist reviewed FOTO scores for Rinku Herzog on 3/13/2025.   FOTO report - see Media section or FOTO account episode details.      Intake: Patient's Physical FS Primary Measure: 22 (goal is 56 @ visit #14)  Intake: Risk Adjusted Statistical FOTO: 51  Intake: Limitation Score: 78%  Intake: Category: Carrying  Intake: NDI:  78.0% (higher score greater disability)     3/28/2025: Patient's Physical FS Primary Measure: 54 (goal is 56 @ visit #14)  Intake: Risk Adjusted Statistical FOTO: 51  3/28/2025: Limitation Score: 46%  3/28/2025: Category: Carrying  3/28/2025: NDI:  30.0% (higher score greater disability)      Intake Score: 22 (predicted 56 by visit #14)%  Survey Score 2: 54 (predicted 56 by visit #14)%       Subjective   Rinku continues doing better today. Less stiffness and soreness right neck area withright upper trap pain. Does best when stays active. Scheduled to see his surgeon on today..  Pain reported as 1/10. Just sore today but no pain.    Objective    Cervical Range of Motion    Active (deg) Passive (deg) Pain   Flexion 40  (avoided overpressure due to surgical restrictions)     Extension 35       Right Lateral Flexion 30  (avoided overpressure due to surgical restrictions)     Right Rotation 55  (avoided overpressure due to  surgical restrictions)     Left Lateral Flexion 25  (avoided overpressure due to surgical restrictions)     Left Rotation 65  (avoided overpressure due to surgical restrictions)              Shoulder Range of Motion  Right Shoulder    Active (deg) Passive (deg) Pain   Flexion 180 180     Extension         Scaption         ABduction 170 180     ADduction         Horizontal ABduction         Horizontal ADduction         External Rotation (Shoulder ABducted 0 degrees)         External Rotation (Shoulder ABducted 45 degrees)         External Rotation (Shoulder ABducted 90 degrees) 85 90     Internal Rotation (Shoulder ABducted 0 degrees) 90 90     Internal Rotation (Shoulder ABducted 45 degrees)         Internal Rotation (Shoulder ABducted 90 degrees)            Left Shoulder    Active (deg) Passive (deg) Pain   Flexion 180 180     Extension         Scaption         ABduction 175 180     ADduction         Horizontal ABduction         Horizontal ADduction         External Rotation (Shoulder ABducted 0 degrees)         External Rotation (Shoulder ABducted 45 degrees)         External Rotation (Shoulder ABducted 90 degrees) 90 90     Internal Rotation (Shoulder ABducted 0 degrees) 90 90     Internal Rotation (Shoulder ABducted 45 degrees)         Internal Rotation (Shoulder ABducted 90 degrees)                  Shoulder Strength - Planes of Motion    Right Strength Right Pain Left Strength Left  Pain   Flexion 4+   5     Extension 4+   5     ABduction 4+   5     ADduction 4+   5     Horizontal ABduction 4+   5     Horizontal ADduction 4+   5     Internal Rotation 0° 4+   5     Internal Rotation 90° 4+   5     External Rotation 0° 4+   5     External Rotation 90° 4+   5                 Treatment:  Therapeutic Exercise  TE 1: 40 mins; Chin tucks, scapula retractions, scap elevations, front headlifts, open book R, R lateral head lifts, open book L, L lateral head lifts, prone head lifts, prone superman, prone W's, cat/camel,  banded rows, banded ext, banded ER, banded IR, horizontal abd, bilateral ER, wall clock, serratus wall slides, NuStep  Manual Therapy  MT 1: 15 mins; PA's at thoracic spinous processes, DTM at medial scap and right upper trap, cervical isometrics; gentle passive neck stretch, manual traction/distraction  Modalities  Electrical Stimulation (Parameters): 15 mins; IFC e-stim with moist heat post ex    Time Entry(in minutes): 70  E-Stim (Unattended) Time Entry: 15 (IFC e-stim with moist heat to the neck and upper trap area)  Manual Therapy Time Entry: 15  Therapeutic Exercise Time Entry: 40    Assessment & Plan   Assessment: Rinku has made really good progress and this is evident by his improved score on the functuional outcome tool. He has gone from 78% limitation to 46%. There continues to be tenderness at T5-T7 thoracic spine with central and lateral PA's. There is improved spinal mobility with PA's. There continues to be right sided neck and upper trap pain/sorenss especially with prolong sitting while using the arms. Continued with rib mobilizations. Cervical ROM looks good but there is limitation with right rotation. Performance with all exercises remains good.  Evaluation/Treatment Tolerance: Patient tolerated treatment well    Patient will continue to benefit from skilled outpatient physical therapy to address the deficits listed in the problem list box on initial evaluation, provide pt/family education and to maximize pt's level of independence in the home and community environment.     Patient's spiritual, cultural, and educational needs considered and patient agreeable to plan of care and goals.           Plan: Continue with the plan of care. Will advance as tolerated and per protocol. Await results of his doctor's appointment    Goals:   Active       LTG       Patient will report at least 20% disability reduction from initial NDI score to indicate clinically significant functional improvement   (Progressing)        Start:  03/16/25    Expected End:  06/06/25            Patient will report at least 30 point increase on initial FOTO score to indicate clinically significant functional improvement   (Progressing)       Start:  03/16/25    Expected End:  06/06/25            Patient will report 80% overall perceived improvement  (Progressing)       Start:  03/16/25    Expected End:  06/06/25            Patient will demonstrate independence and compliance with final HEP  (Progressing)       Start:  03/16/25    Expected End:  06/06/25              Resolved       STG       Patient will report at least 10% disability reduction from initial NDI score to indicate clinically significant functional improvement   (Met)       Start:  03/16/25    Expected End:  04/25/25    Resolved:  03/31/25         Patient will report at least 10 pt increase on initial FOTO score to indicate clinically significant functional improvement   (Met)       Start:  03/16/25    Expected End:  04/25/25    Resolved:  03/31/25         Patient will report 50% overall perceived improvement  (Met)       Start:  03/16/25    Expected End:  04/25/25    Resolved:  03/31/25         Patient will demonstrate independence and compliance with HEP  (Met)       Start:  03/16/25    Expected End:  04/25/25    Resolved:  03/31/25             Chandler Castellon, PT

## 2025-04-09 ENCOUNTER — CLINICAL SUPPORT (OUTPATIENT)
Dept: REHABILITATION | Facility: HOSPITAL | Age: 60
End: 2025-04-09
Payer: COMMERCIAL

## 2025-04-09 DIAGNOSIS — M54.2 NECK PAIN: Primary | ICD-10-CM

## 2025-04-09 DIAGNOSIS — Z98.1 S/P CERVICAL SPINAL FUSION: ICD-10-CM

## 2025-04-09 DIAGNOSIS — M48.02 SPINAL STENOSIS IN CERVICAL REGION: ICD-10-CM

## 2025-04-09 PROCEDURE — 97014 ELECTRIC STIMULATION THERAPY: CPT

## 2025-04-09 PROCEDURE — 97140 MANUAL THERAPY 1/> REGIONS: CPT

## 2025-04-09 PROCEDURE — 97110 THERAPEUTIC EXERCISES: CPT

## 2025-04-09 NOTE — PROGRESS NOTES
"  Outpatient Rehab    Physical Therapy Visit    Patient Name: Rinku Herzog  MRN: [id number]  YOB: 1965  Encounter Date: 4/9/2025    Therapy Diagnosis:   Encounter Diagnoses   Name Primary?    Neck pain Yes    S/P cervical spinal fusion     Spinal stenosis in cervical region      Physician: Omar Lira, *    Physician Orders: Eval and Treat  Medical Diagnosis: Spinal stenosis in cervical region     Visit # / Visits Authorized:  11/ 50  Date of Evaluation: 3/13/2025  Insurance Authorization Period: 3/12/2025 to 3/12/2026  Plan of Care Certification:  3/13/2025 to 6/6/2025      Time In: 0700   Time Out: 0810  Total Time: 70   Total Billable Time: 70    FOTO:  Therapist reviewed FOTO scores for Rinku Hezrog on 3/13/2025.   FOTO report - see Media section or FOTO account episode details.      Intake: Patient's Physical FS Primary Measure: 22 (goal is 56 @ visit #14)  Intake: Risk Adjusted Statistical FOTO: 51  Intake: Limitation Score: 78%  Intake: Category: Carrying  Intake: NDI:  78.0% (higher score greater disability)     3/28/2025: Patient's Physical FS Primary Measure: 54 (goal is 56 @ visit #14)  Intake: Risk Adjusted Statistical FOTO: 51  3/28/2025: Limitation Score: 46%  3/28/2025: Category: Carrying  3/28/2025: NDI:  30.0% (higher score greater disability)      Intake Score: 22 (predicted 56 by visit #14)%  Survey Score 2: 54 (predicted 56 by visit #14)%       Subjective   Rinku saw his doctor yesterday who took x-rays and all hardware in 'perfect position."  Did schedule him for an MRI and will be done on the 24th. Continues have some stiffness and soreness right neck area with right upper trap pain. Does best when stays active. Prolong sitting will exacerbate his symptoms..    Just sore today but no pain.    Objective    Cervical Range of Motion    Active (deg) Passive (deg) Pain   Flexion 40  (avoided overpressure due to surgical restrictions)     Extension 35       Right Lateral " Flexion 30  (avoided overpressure due to surgical restrictions)     Right Rotation 55  (avoided overpressure due to surgical restrictions)     Left Lateral Flexion 25  (avoided overpressure due to surgical restrictions)     Left Rotation 65  (avoided overpressure due to surgical restrictions)              Shoulder Range of Motion  Right Shoulder    Active (deg) Passive (deg) Pain   Flexion 180 180     Extension         Scaption         ABduction 170 180     ADduction         Horizontal ABduction         Horizontal ADduction         External Rotation (Shoulder ABducted 0 degrees)         External Rotation (Shoulder ABducted 45 degrees)         External Rotation (Shoulder ABducted 90 degrees) 85 90     Internal Rotation (Shoulder ABducted 0 degrees) 90 90     Internal Rotation (Shoulder ABducted 45 degrees)         Internal Rotation (Shoulder ABducted 90 degrees)            Left Shoulder    Active (deg) Passive (deg) Pain   Flexion 180 180     Extension         Scaption         ABduction 175 180     ADduction         Horizontal ABduction         Horizontal ADduction         External Rotation (Shoulder ABducted 0 degrees)         External Rotation (Shoulder ABducted 45 degrees)         External Rotation (Shoulder ABducted 90 degrees) 90 90     Internal Rotation (Shoulder ABducted 0 degrees) 90 90     Internal Rotation (Shoulder ABducted 45 degrees)         Internal Rotation (Shoulder ABducted 90 degrees)                  Shoulder Strength - Planes of Motion    Right Strength Right Pain Left Strength Left  Pain   Flexion 4+   5     Extension 4+   5     ABduction 4+   5     ADduction 4+   5     Horizontal ABduction 4+   5     Horizontal ADduction 4+   5     Internal Rotation 0° 4+   5     Internal Rotation 90° 4+   5     External Rotation 0° 4+   5     External Rotation 90° 4+   5                 Treatment:  Therapeutic Exercise  TE 1: 40 mins; Chin tucks, scapula retractions, scap elevations, front headlifts, open book  R, R lateral head lifts, open book L, L lateral head lifts, prone head lifts, prone superman, prone W's, cat/camel, banded rows, banded ext, banded ER, banded IR, horizontal abd, bilateral ER, wall clock, serratus wall slides, NuStep  Manual Therapy  MT 1: 15 mins; PA's at thoracic spinous processes, DTM at medial scap and right upper trap, cervical isometrics; gentle passive neck stretch, manual traction/distraction  Modalities  Electrical Stimulation (Parameters): 15 mins; IFC e-stim with moist heat post ex    Time Entry(in minutes): 70  E-Stim (Unattended) Time Entry: 15 (IFC e-stim with moist heat to the neck and upper trap area)  Manual Therapy Time Entry: 15  Therapeutic Exercise Time Entry: 40    Assessment & Plan   Assessment: Rinku has made really good progress and this is evident by his improved score on the functional outcome tool. He has gone from 78% limitation to 46%. There continues to be tenderness at T5-T7 thoracic spine with central and lateral PA's. There is improved spinal mobility with PA's. There continues to be right sided neck and upper trap pain/sorenss especially with prolong sitting while using the arms. This seems to be muscular especially mechanically allo hardware is in good alignement. We continued with rib mobilizations and no issues. Cervical ROM looks good but there is limitation with right rotation. Performance with all exercises remains good.  Evaluation/Treatment Tolerance: Patient tolerated treatment well    Patient will continue to benefit from skilled outpatient physical therapy to address the deficits listed in the problem list box on initial evaluation, provide pt/family education and to maximize pt's level of independence in the home and community environment.     Patient's spiritual, cultural, and educational needs considered and patient agreeable to plan of care and goals.           Plan: Continue with the plan of care. Will advance as tolerated.    Goals:   Active        LTG       Patient will report at least 20% disability reduction from initial NDI score to indicate clinically significant functional improvement   (Progressing)       Start:  03/16/25    Expected End:  06/06/25            Patient will report at least 30 point increase on initial FOTO score to indicate clinically significant functional improvement   (Progressing)       Start:  03/16/25    Expected End:  06/06/25            Patient will report 80% overall perceived improvement  (Progressing)       Start:  03/16/25    Expected End:  06/06/25            Patient will demonstrate independence and compliance with final HEP  (Progressing)       Start:  03/16/25    Expected End:  06/06/25              Resolved       STG       Patient will report at least 10% disability reduction from initial NDI score to indicate clinically significant functional improvement   (Met)       Start:  03/16/25    Expected End:  04/25/25    Resolved:  03/31/25         Patient will report at least 10 pt increase on initial FOTO score to indicate clinically significant functional improvement   (Met)       Start:  03/16/25    Expected End:  04/25/25    Resolved:  03/31/25         Patient will report 50% overall perceived improvement  (Met)       Start:  03/16/25    Expected End:  04/25/25    Resolved:  03/31/25         Patient will demonstrate independence and compliance with HEP  (Met)       Start:  03/16/25    Expected End:  04/25/25    Resolved:  03/31/25             Chandler Castellon PT

## 2025-04-11 ENCOUNTER — CLINICAL SUPPORT (OUTPATIENT)
Dept: REHABILITATION | Facility: HOSPITAL | Age: 60
End: 2025-04-11
Payer: COMMERCIAL

## 2025-04-11 DIAGNOSIS — M54.2 NECK PAIN: Primary | ICD-10-CM

## 2025-04-11 DIAGNOSIS — Z98.1 S/P CERVICAL SPINAL FUSION: ICD-10-CM

## 2025-04-11 DIAGNOSIS — M48.02 SPINAL STENOSIS IN CERVICAL REGION: ICD-10-CM

## 2025-04-11 PROCEDURE — 97110 THERAPEUTIC EXERCISES: CPT

## 2025-04-11 PROCEDURE — 97140 MANUAL THERAPY 1/> REGIONS: CPT

## 2025-04-11 PROCEDURE — 97014 ELECTRIC STIMULATION THERAPY: CPT

## 2025-04-11 NOTE — PROGRESS NOTES
Outpatient Rehab    Physical Therapy Progress Note    Patient Name: Rinku Herzog  MRN: [id number]  YOB: 1965  Encounter Date: 4/11/2025    Therapy Diagnosis:   Encounter Diagnoses   Name Primary?    Neck pain Yes    S/P cervical spinal fusion     Spinal stenosis in cervical region      Physician: Omar Lira, *    Physician Orders: Eval and Treat  Medical Diagnosis: Spinal stenosis in cervical region    Visit # / Visits Authorized:  13 / 50  Insurance Authorization Period: 3/12/2025 to 3/12/2026  Date of Evaluation: 3/13/2025   Plan of Care Certification: 3/13/2025 to 6/6/2025      Time In: 0645   Time Out: 0755  Total Time: 70   Total Billable Time: 70    FOTO:  Therapist reviewed FOTO scores for Rinku Herzog on 3/13/2025.   FOTO report - see Media section or FOTO account episode details.      Intake: Patient's Physical FS Primary Measure: 22 (goal is 56 @ visit #14)  Intake: Risk Adjusted Statistical FOTO: 51  Intake: Limitation Score: 78%  Intake: Category: Carrying  Intake: NDI:  78.0% (higher score greater disability)      3/28/2025: Patient's Physical FS Primary Measure: 54 (goal is 56 @ visit #14)  Intake: Risk Adjusted Statistical FOTO: 51  3/28/2025: Limitation Score: 46%  3/28/2025: Category: Carrying  3/28/2025: NDI:  30.0% (higher score greater disability)      Intake Score: 22 (predicted 56 by visit #14)%  Survey Score 2: 54 (predicted 56 by visit #14)%       Subjective   Rinku doing pretty well. Symptoms persist but a more mild today. Continues have some stiffness and soreness right neck area with right upper trap soreness. No upper extremity symptoms like before the surgery. Does best when stays active. Prolong sitting will exacerbate his symptoms..    Just sore today but no pain.    Objective      Subcranial Range of Motion   Active Restricted? Passive Restricted? Pain   Flexion         Protraction         Retraction           Cervical Range of Motion   Active (deg) Passive  (deg) Pain   Flexion 48       Extension 40       Right Lateral Flexion 35       Right Rotation 65       Left Lateral Flexion 32       Left Rotation 75                     Treatment:  Therapeutic Exercise  TE 1: 40 mins; Chin tucks, scapula retractions, scap elevations, front headlifts, open book R, R lateral head lifts, open book L, L lateral head lifts, prone head lifts, prone superman, prone W's, cat/camel, banded rows, banded ext, banded ER, banded IR, horizontal abd, bilateral ER, wall clock, serratus wall slides, NuStep  Manual Therapy  MT 1: 15 mins; PA's at thoracic spinous processes, DTM at medial scap and right upper trap, cervical isometrics; gentle passive neck stretch, manual traction/distraction  Modalities  Electrical Stimulation (Parameters): 15 mins; IFC e-stim with moist heat post ex    Time Entry(in minutes):  E-Stim (Unattended) Time Entry: 15 (IFC e-stim with moist heat to the neck and upper trap area)  Manual Therapy Time Entry: 15  Therapeutic Exercise Time Entry: 40    Assessment & Plan   Assessment: Rinku has made really good progress and this is evident by his improved score on the functional outcome tool. He has gone from 78% limitation to 46%. Much less tenderness thoracic spine with central and lateral PA's and there is improved spinal mobility with PA's. There continues to be right sided neck and upper trap pain/sorenss especially with prolong sitting while using the arms. This seems to be muscular especially mechanically allo hardware is in good alignement. We continued with rib mobilizations and no issues. Cervical ROM looks good but there is limitation with right rotation. Performance with all exercises remains good.  Evaluation/Treatment Tolerance: Patient tolerated treatment well    Patient will continue to benefit from skilled outpatient physical therapy to address the deficits listed in the problem list box on initial evaluation, provide pt/family education and to maximize pt's  level of independence in the home and community environment.     Patient's spiritual, cultural, and educational needs considered and patient agreeable to plan of care and goals.           Plan: Continue with the plan of care. Will advance as tolerated.    Goals:   Active       LTG       Patient will report at least 20% disability reduction from initial NDI score to indicate clinically significant functional improvement   (Progressing)       Start:  03/16/25    Expected End:  06/06/25            Patient will report at least 30 point increase on initial FOTO score to indicate clinically significant functional improvement   (Progressing)       Start:  03/16/25    Expected End:  06/06/25            Patient will report 80% overall perceived improvement  (Progressing)       Start:  03/16/25    Expected End:  06/06/25            Patient will demonstrate independence and compliance with final HEP  (Progressing)       Start:  03/16/25    Expected End:  06/06/25              Resolved       STG       Patient will report at least 10% disability reduction from initial NDI score to indicate clinically significant functional improvement   (Met)       Start:  03/16/25    Expected End:  04/25/25    Resolved:  03/31/25         Patient will report at least 10 pt increase on initial FOTO score to indicate clinically significant functional improvement   (Met)       Start:  03/16/25    Expected End:  04/25/25    Resolved:  03/31/25         Patient will report 50% overall perceived improvement  (Met)       Start:  03/16/25    Expected End:  04/25/25    Resolved:  03/31/25         Patient will demonstrate independence and compliance with HEP  (Met)       Start:  03/16/25    Expected End:  04/25/25    Resolved:  03/31/25             Chandler Castellon PT

## 2025-04-14 ENCOUNTER — CLINICAL SUPPORT (OUTPATIENT)
Dept: REHABILITATION | Facility: HOSPITAL | Age: 60
End: 2025-04-14
Payer: COMMERCIAL

## 2025-04-14 DIAGNOSIS — Z98.1 S/P CERVICAL SPINAL FUSION: ICD-10-CM

## 2025-04-14 DIAGNOSIS — M48.02 SPINAL STENOSIS IN CERVICAL REGION: ICD-10-CM

## 2025-04-14 DIAGNOSIS — M54.2 NECK PAIN: Primary | ICD-10-CM

## 2025-04-14 PROCEDURE — 97140 MANUAL THERAPY 1/> REGIONS: CPT

## 2025-04-14 PROCEDURE — 97110 THERAPEUTIC EXERCISES: CPT

## 2025-04-14 PROCEDURE — 97014 ELECTRIC STIMULATION THERAPY: CPT

## 2025-04-14 NOTE — PROGRESS NOTES
Outpatient Rehab    Physical Therapy Progress Note    Patient Name: Rinku Herzog  MRN: [id number]  YOB: 1965  Encounter Date: 4/14/2025    Therapy Diagnosis:   Encounter Diagnoses   Name Primary?    Neck pain Yes    S/P cervical spinal fusion     Spinal stenosis in cervical region      Physician: Omar Lira, *    Physician Orders: Eval and Treat  Medical Diagnosis: Spinal stenosis in cervical region    Visit # / Visits Authorized:  14 / 50  Insurance Authorization Period: 3/12/2025 to 3/12/2026  Date of Evaluation: 3/13/2025   Plan of Care Certification: 3/13/2025 to 6/6/2025      Time In: 0650   Time Out: 0800  Total Time: 70   Total Billable Time: 70    FOTO:  Therapist reviewed FOTO scores for Rinku Herzog on 3/13/2025.   FOTO report - see Media section or FOTO account episode details.      Intake: Patient's Physical FS Primary Measure: 22 (goal is 56 @ visit #14)  Intake: Risk Adjusted Statistical FOTO: 51  Intake: Limitation Score: 78%  Intake: Category: Carrying  Intake: NDI:  78.0% (higher score greater disability)      3/28/2025: Patient's Physical FS Primary Measure: 54 (goal is 56 @ visit #14)  Intake: Risk Adjusted Statistical FOTO: 51  3/28/2025: Limitation Score: 46%  3/28/2025: Category: Carrying  3/28/2025: NDI:  30.0% (higher score greater disability)      Intake Score: 22 (predicted 56 by visit #14)%  Survey Score 2: 54 (predicted 56 by visit #14)%       Subjective   Rinku doing pretty well and states that he is feeling a little better. Continues have some stiffness and soreness right neck area with right upper trap soreness. No upper extremity symptoms like before the surgery. Does best when stays active. Prolong sitting will exacerbate his symptoms..  Pain reported as 1/10. Just sore today but no pain.    Objective    Cervical Range of Motion    Active (deg) Passive (deg) Pain   Flexion 40  (avoided overpressure due to surgical restrictions)     Extension 35       Right  Lateral Flexion 30  (avoided overpressure due to surgical restrictions)     Right Rotation 55  (avoided overpressure due to surgical restrictions)     Left Lateral Flexion 25  (avoided overpressure due to surgical restrictions)     Left Rotation 65  (avoided overpressure due to surgical restrictions)              Shoulder Range of Motion  Right Shoulder    Active (deg) Passive (deg) Pain   Flexion 180 180     Extension         Scaption         ABduction 170 180     ADduction         Horizontal ABduction         Horizontal ADduction         External Rotation (Shoulder ABducted 0 degrees)         External Rotation (Shoulder ABducted 45 degrees)         External Rotation (Shoulder ABducted 90 degrees) 85 90     Internal Rotation (Shoulder ABducted 0 degrees) 90 90     Internal Rotation (Shoulder ABducted 45 degrees)         Internal Rotation (Shoulder ABducted 90 degrees)            Left Shoulder    Active (deg) Passive (deg) Pain   Flexion 180 180     Extension         Scaption         ABduction 175 180     ADduction         Horizontal ABduction         Horizontal ADduction         External Rotation (Shoulder ABducted 0 degrees)         External Rotation (Shoulder ABducted 45 degrees)         External Rotation (Shoulder ABducted 90 degrees) 90 90     Internal Rotation (Shoulder ABducted 0 degrees) 90 90     Internal Rotation (Shoulder ABducted 45 degrees)         Internal Rotation (Shoulder ABducted 90 degrees)                  Shoulder Strength - Planes of Motion    Right Strength Right Pain Left Strength Left  Pain   Flexion 4+   5     Extension 4+   5     ABduction 4+   5     ADduction 4+   5     Horizontal ABduction 4+   5     Horizontal ADduction 4+   5     Internal Rotation 0° 4+   5     Internal Rotation 90° 4+   5     External Rotation 0° 4+   5     External Rotation 90° 4+   5                    Treatment:  Therapeutic Exercise  TE 1: 40 mins; Chin tucks, scapula retractions, scap elevations, front headlifts,  open book R, R lateral head lifts, open book L, L lateral head lifts, prone head lifts, prone superman, prone W's, cat/camel, banded rows, banded ext, banded ER, banded IR, horizontal abd, bilateral ER, wall clock, serratus wall slides, NuStep  Manual Therapy  MT 1: 15 mins; PA's at thoracic spinous processes, DTM at medial scap and right upper trap, cervical isometrics; gentle passive neck stretch, manual traction/distraction  Modalities  Electrical Stimulation (Parameters): 15 mins; IFC e-stim with moist heat post ex    Time Entry(in minutes): 70  E-Stim (Unattended) Time Entry: 15 (IFC e-stim with moist heat to the neck and upper trap area)  Manual Therapy Time Entry: 15  Therapeutic Exercise Time Entry: 40    Assessment & Plan   Assessment: Rinku continues to make good progress. Much less tenderness thoracic spine with central and lateral PA's and there is improved spinal mobility with PA's. There continues to be right sided neck and upper trap pain/sorenss especially with prolong sitting while using the arms. This seems to be muscular especially mechanically allo hardware is in good alignement. We continued with rib mobilizations and no issues. Cervical ROM looks good but there is limitation with right rotation. Performance with all exercises remains good.  Evaluation/Treatment Tolerance: Patient tolerated treatment well    Patient will continue to benefit from skilled outpatient physical therapy to address the deficits listed in the problem list box on initial evaluation, provide pt/family education and to maximize pt's level of independence in the home and community environment.     Patient's spiritual, cultural, and educational needs considered and patient agreeable to plan of care and goals.           Plan: Continue with the plan of care. Will advance as tolerated.    Goals:   Active       LTG       Patient will report at least 20% disability reduction from initial NDI score to indicate clinically  significant functional improvement   (Progressing)       Start:  03/16/25    Expected End:  06/06/25            Patient will report at least 30 point increase on initial FOTO score to indicate clinically significant functional improvement   (Progressing)       Start:  03/16/25    Expected End:  06/06/25            Patient will report 80% overall perceived improvement  (Progressing)       Start:  03/16/25    Expected End:  06/06/25            Patient will demonstrate independence and compliance with final HEP  (Progressing)       Start:  03/16/25    Expected End:  06/06/25              Resolved       STG       Patient will report at least 10% disability reduction from initial NDI score to indicate clinically significant functional improvement   (Met)       Start:  03/16/25    Expected End:  04/25/25    Resolved:  03/31/25         Patient will report at least 10 pt increase on initial FOTO score to indicate clinically significant functional improvement   (Met)       Start:  03/16/25    Expected End:  04/25/25    Resolved:  03/31/25         Patient will report 50% overall perceived improvement  (Met)       Start:  03/16/25    Expected End:  04/25/25    Resolved:  03/31/25         Patient will demonstrate independence and compliance with HEP  (Met)       Start:  03/16/25    Expected End:  04/25/25    Resolved:  03/31/25             Chandler Castellon PT

## 2025-04-16 ENCOUNTER — CLINICAL SUPPORT (OUTPATIENT)
Dept: REHABILITATION | Facility: HOSPITAL | Age: 60
End: 2025-04-16
Payer: COMMERCIAL

## 2025-04-16 DIAGNOSIS — M54.2 NECK PAIN: Primary | ICD-10-CM

## 2025-04-16 DIAGNOSIS — M48.02 SPINAL STENOSIS IN CERVICAL REGION: ICD-10-CM

## 2025-04-16 DIAGNOSIS — Z98.1 S/P CERVICAL SPINAL FUSION: ICD-10-CM

## 2025-04-16 PROCEDURE — 97140 MANUAL THERAPY 1/> REGIONS: CPT

## 2025-04-16 PROCEDURE — 97014 ELECTRIC STIMULATION THERAPY: CPT

## 2025-04-16 PROCEDURE — 97110 THERAPEUTIC EXERCISES: CPT

## 2025-04-16 NOTE — PROGRESS NOTES
Outpatient Rehab    Physical Therapy Visit  Dry Needling Only    Patient Name: Rinku Herzog  MRN: [id number]  YOB: 1965  Encounter Date: 4/16/2025    Therapy Diagnosis:   Encounter Diagnoses   Name Primary?    Neck pain Yes    S/P cervical spinal fusion     Spinal stenosis in cervical region      Physician: Omar Lira, *    Physician Orders: Eval and Treat  Medical Diagnosis: Spinal stenosis in cervical region    Visit # / Visits Authorized:  16 / 50  Insurance Authorization Period: 3/12/2025 to 3/12/2026    Time In: 0735   Time Out: 0800  Total Time: 25        Subjective   Pt requests dry needling. PT agreed to treat pt following normally scheduled visit with supervising PT..         Objective            Treatment:  Other Activities  Activity 1: DN set up, performance, and removal  Activity 2: IFES added to DN  Activity 3: prone: .30x60 to GUS UT, .30x30 to upper thoracic paraspinals and cervical paraspinals, .18x15 to R SCM and R SO    Time Entry(in minutes):  E-Stim (Unattended) Time Entry: 10  Number of Muscles Needled: 6  Needle Insertions Time Entry: 25    Assessment & Plan   Assessment: Pt provided verbal and written consent for dry needling this date. Written consent is available in patients files. Pt tolerated dry needling the right neck and upper traps well without adverse response. This session of dry needling took place following his traditional PT visit with supervising PT.  Evaluation/Treatment Tolerance: Patient tolerated treatment well    Patient will continue to benefit from skilled outpatient physical therapy to address the deficits listed in the problem list box on initial evaluation, provide pt/family education and to maximize pt's level of independence in the home and community environment.     Patient's spiritual, cultural, and educational needs considered and patient agreeable to plan of care and goals.     Education  Education was done with Patient. The patient's  learning style includes Demonstration, Listening, and Reading. The patient Verbalizes understanding and Demonstrates understanding.         discussed dry needling theory, risk, benefits, and treatment       Plan: Continue with the plan of care. Will advance as tolerated.    Goals:   Active       LTG       Patient will report at least 20% disability reduction from initial NDI score to indicate clinically significant functional improvement   (Progressing)       Start:  03/16/25    Expected End:  06/06/25            Patient will report at least 30 point increase on initial FOTO score to indicate clinically significant functional improvement   (Progressing)       Start:  03/16/25    Expected End:  06/06/25            Patient will report 80% overall perceived improvement  (Progressing)       Start:  03/16/25    Expected End:  06/06/25            Patient will demonstrate independence and compliance with final HEP  (Progressing)       Start:  03/16/25    Expected End:  06/06/25              Resolved       STG       Patient will report at least 10% disability reduction from initial NDI score to indicate clinically significant functional improvement   (Met)       Start:  03/16/25    Expected End:  04/25/25    Resolved:  03/31/25         Patient will report at least 10 pt increase on initial FOTO score to indicate clinically significant functional improvement   (Met)       Start:  03/16/25    Expected End:  04/25/25    Resolved:  03/31/25         Patient will report 50% overall perceived improvement  (Met)       Start:  03/16/25    Expected End:  04/25/25    Resolved:  03/31/25         Patient will demonstrate independence and compliance with HEP  (Met)       Start:  03/16/25    Expected End:  04/25/25    Resolved:  03/31/25             Pio Coburn, PT

## 2025-04-16 NOTE — PROGRESS NOTES
Outpatient Rehab    Physical Therapy Progress Note    Patient Name: Rinku Herzog  MRN: [id number]  YOB: 1965  Encounter Date: 4/16/2025    Therapy Diagnosis:   Encounter Diagnoses   Name Primary?    Neck pain Yes    S/P cervical spinal fusion     Spinal stenosis in cervical region      Physician: Omar Lira, *    Physician Orders: Eval and Treat  Medical Diagnosis: Spinal stenosis in cervical region    Visit # / Visits Authorized:  16 / 50  Insurance Authorization Period: 3/12/2025 to 3/12/2026  Date of Evaluation: 3/13/2025   Plan of Care Certification: 3/13/2025 to 6/6/2025      Time In: 0645   Time Out: 0740  Total Time: 55   Total Billable Time: 55    FOTO:  Therapist reviewed FOTO scores for Rinku Herzog on 3/13/2025.   FOTO report - see Media section or FOTO account episode details.      Intake: Patient's Physical FS Primary Measure: 22 (goal is 56 @ visit #14)  Intake: Risk Adjusted Statistical FOTO: 51  Intake: Limitation Score: 78%  Intake: Category: Carrying  Intake: NDI:  78.0% (higher score greater disability)      3/28/2025: Patient's Physical FS Primary Measure: 54 (goal is 56 @ visit #14)  Intake: Risk Adjusted Statistical FOTO: 51  3/28/2025: Limitation Score: 46%  3/28/2025: Category: Carrying  3/28/2025: NDI:  30.0% (higher score greater disability)      Intake Score: 22 (predicted 56 by visit #14)%  Survey Score 2: 54 (predicted 56 by visit #14)%       Subjective   Rinku doing pretty well and states that he is feeling a little better. Continues have some stiffness and soreness right neck area with right upper trap  No upper extremity symptoms like before the surgery. Does best when stays active. Prolong sitting will exacerbate his symptoms. Wanting to try some dry needling and has been scheduled for this morning..  Pain reported as 1/10.      Objective    Cervical Range of Motion    Active (deg) Passive (deg) Pain   Flexion 40  (avoided overpressure due to surgical  restrictions)     Extension 35       Right Lateral Flexion 30  (avoided overpressure due to surgical restrictions)     Right Rotation 55  (avoided overpressure due to surgical restrictions)     Left Lateral Flexion 25  (avoided overpressure due to surgical restrictions)     Left Rotation 65  (avoided overpressure due to surgical restrictions)              Shoulder Range of Motion  Right Shoulder    Active (deg) Passive (deg) Pain   Flexion 180 180     Extension         Scaption         ABduction 170 180     ADduction         Horizontal ABduction         Horizontal ADduction         External Rotation (Shoulder ABducted 0 degrees)         External Rotation (Shoulder ABducted 45 degrees)         External Rotation (Shoulder ABducted 90 degrees) 85 90     Internal Rotation (Shoulder ABducted 0 degrees) 90 90     Internal Rotation (Shoulder ABducted 45 degrees)         Internal Rotation (Shoulder ABducted 90 degrees)            Left Shoulder    Active (deg) Passive (deg) Pain   Flexion 180 180     Extension         Scaption         ABduction 175 180     ADduction         Horizontal ABduction         Horizontal ADduction         External Rotation (Shoulder ABducted 0 degrees)         External Rotation (Shoulder ABducted 45 degrees)         External Rotation (Shoulder ABducted 90 degrees) 90 90     Internal Rotation (Shoulder ABducted 0 degrees) 90 90     Internal Rotation (Shoulder ABducted 45 degrees)         Internal Rotation (Shoulder ABducted 90 degrees)                  Shoulder Strength - Planes of Motion    Right Strength Right Pain Left Strength Left  Pain   Flexion 4+   5     Extension 4+   5     ABduction 4+   5     ADduction 4+   5     Horizontal ABduction 4+   5     Horizontal ADduction 4+   5     Internal Rotation 0° 4+   5     Internal Rotation 90° 4+   5     External Rotation 0° 4+   5     External Rotation 90° 4+   5                    Treatment:  Therapeutic Exercise  TE 1: 40 mins; Chin tucks, scapula  retractions, scap elevations, front headlifts, open book R, R lateral head lifts, open book L, L lateral head lifts, prone head lifts, prone superman, prone W's, cat/camel, banded rows, banded ext, banded ER, banded IR, horizontal abd, bilateral ER, wall clock, serratus wall slides, NuStep  Manual Therapy  MT 1: 15 mins; PA's at thoracic spinous processes, DTM at medial scap and right upper trap, cervical isometrics; gentle passive neck stretch, manual traction/distraction      Time Entry(in minutes): 55  Manual Therapy Time Entry: 15  Therapeutic Exercise Time Entry: 40    Assessment & Plan   Assessment: Rinku continues to show some progress. Much less tenderness thoracic spine with central and lateral PA's and there is improved spinal mobility with PA's. There continues to be right sided neck and upper trap pain/sorenss especially with prolong sitting while using the arms. Small trigger point in the right upper trap that will be addressed with dry needling.Hardware in good alignement per x-rays from physician. We continued with rib mobilizations and no issues. Cervical ROM looks good but there is limitation with right rotation. Performance with all exercises remains good.  Evaluation/Treatment Tolerance: Patient tolerated treatment well    Patient will continue to benefit from skilled outpatient physical therapy to address the deficits listed in the problem list box on initial evaluation, provide pt/family education and to maximize pt's level of independence in the home and community environment.     Patient's spiritual, cultural, and educational needs considered and patient agreeable to plan of care and goals.           Plan: Continue with the plan of care. Will advance as tolerated. If responded well to the dry needling and good results will continue.    Goals:   Active       LTG       Patient will report at least 20% disability reduction from initial NDI score to indicate clinically significant functional  improvement   (Progressing)       Start:  03/16/25    Expected End:  06/06/25            Patient will report at least 30 point increase on initial FOTO score to indicate clinically significant functional improvement   (Progressing)       Start:  03/16/25    Expected End:  06/06/25            Patient will report 80% overall perceived improvement  (Progressing)       Start:  03/16/25    Expected End:  06/06/25            Patient will demonstrate independence and compliance with final HEP  (Progressing)       Start:  03/16/25    Expected End:  06/06/25              Resolved       STG       Patient will report at least 10% disability reduction from initial NDI score to indicate clinically significant functional improvement   (Met)       Start:  03/16/25    Expected End:  04/25/25    Resolved:  03/31/25         Patient will report at least 10 pt increase on initial FOTO score to indicate clinically significant functional improvement   (Met)       Start:  03/16/25    Expected End:  04/25/25    Resolved:  03/31/25         Patient will report 50% overall perceived improvement  (Met)       Start:  03/16/25    Expected End:  04/25/25    Resolved:  03/31/25         Patient will demonstrate independence and compliance with HEP  (Met)       Start:  03/16/25    Expected End:  04/25/25    Resolved:  03/31/25             Chandler Castellon PT

## 2025-04-21 ENCOUNTER — CLINICAL SUPPORT (OUTPATIENT)
Dept: REHABILITATION | Facility: HOSPITAL | Age: 60
End: 2025-04-21
Payer: COMMERCIAL

## 2025-04-21 DIAGNOSIS — Z98.1 S/P CERVICAL SPINAL FUSION: ICD-10-CM

## 2025-04-21 DIAGNOSIS — M54.2 NECK PAIN: Primary | ICD-10-CM

## 2025-04-21 DIAGNOSIS — M48.02 SPINAL STENOSIS IN CERVICAL REGION: ICD-10-CM

## 2025-04-21 PROCEDURE — 97110 THERAPEUTIC EXERCISES: CPT

## 2025-04-21 PROCEDURE — 97014 ELECTRIC STIMULATION THERAPY: CPT

## 2025-04-21 PROCEDURE — 97140 MANUAL THERAPY 1/> REGIONS: CPT

## 2025-04-21 NOTE — PROGRESS NOTES
"  Outpatient Rehab    Physical Therapy Visit      Patient Name: Rinku Herzog  MRN: [id number]  YOB: 1965  Encounter Date: 4/21/2025    Therapy Diagnosis:   Encounter Diagnoses   Name Primary?    Neck pain Yes    S/P cervical spinal fusion     Spinal stenosis in cervical region      Physician: Omar Lira, *    Physician Orders: Eval and Treat  Medical Diagnosis: Spinal stenosis in cervical region    Visit # / Visits Authorized:  17 / 50  Insurance Authorization Period: 3/12/2025 to 3/12/2026    Time In: 0655   Time Out: 0805  Total Time: 70        Subjective   Rinku doing ok today but had an episode yesterday where it was really "biting." Pain in the right neck, upper trap out to the shoulder. Reports that he did nothing and watched what he lifted. Goes for an MRI tomorrow.  Pain reported as 2/10.      Objective     Cervical Range of Motion    Active (deg) Passive (deg) Pain   Flexion 40  (avoided overpressure due to surgical restrictions)     Extension 35       Right Lateral Flexion 30  (avoided overpressure due to surgical restrictions)     Right Rotation 55  (avoided overpressure due to surgical restrictions)     Left Lateral Flexion 25  (avoided overpressure due to surgical restrictions)     Left Rotation 65  (avoided overpressure due to surgical restrictions)                 Treatment:  Therapeutic Exercise  TE 1: 40 mins; Chin tucks, scapula retractions, scap elevations, front headlifts, open book R, R lateral head lifts, open book L, L lateral head lifts, prone head lifts, prone superman, prone W's, cat/camel, banded rows, banded ext, banded ER, banded IR, horizontal abd, bilateral ER, wall clock, serratus wall slides, NuStep  Manual Therapy  MT 1: 15 mins; PA's at thoracic spinous processes, DTM at medial scap and right upper trap, cervical isometrics; gentle passive neck stretch, manual traction/distraction  Modalities  Electrical Stimulation (Parameters): 15 mins; IFC e-stim with " moist heat post ex    Time Entry(in minutes): 70  E-Stim (Unattended) Time Entry: 15 (IFC e-stim with moist heat post ex)  Manual Therapy Time Entry: 15  Therapeutic Exercise Time Entry: 40    Assessment & Plan   Assessment: Overall Rinku continues to show progress but there is the lingering pain and soreness on his right side. He demonstrated much less tenderness at the thoracic spine with central and lateral PA's and there is improved spinal mobility with PA's. No real trigger points today. Cervical ROM looks good but there is limitation with right rotation. Performance with all exercises remains good. Will await results of his MRI.  Evaluation/Treatment Tolerance: Patient tolerated treatment well    Patient will continue to benefit from skilled outpatient physical therapy to address the deficits listed in the problem list box on initial evaluation, provide pt/family education and to maximize pt's level of independence in the home and community environment.     Patient's spiritual, cultural, and educational needs considered and patient agreeable to plan of care and goals.        Plan: Continue with the plan of care. Will await results of his MRI.    Goals:   Active       LTG       Patient will report at least 20% disability reduction from initial NDI score to indicate clinically significant functional improvement   (Progressing)       Start:  03/16/25    Expected End:  06/06/25            Patient will report at least 30 point increase on initial FOTO score to indicate clinically significant functional improvement   (Progressing)       Start:  03/16/25    Expected End:  06/06/25            Patient will report 80% overall perceived improvement  (Progressing)       Start:  03/16/25    Expected End:  06/06/25            Patient will demonstrate independence and compliance with final HEP  (Progressing)       Start:  03/16/25    Expected End:  06/06/25              Resolved       STG       Patient will report at least  10% disability reduction from initial NDI score to indicate clinically significant functional improvement   (Met)       Start:  03/16/25    Expected End:  04/25/25    Resolved:  03/31/25         Patient will report at least 10 pt increase on initial FOTO score to indicate clinically significant functional improvement   (Met)       Start:  03/16/25    Expected End:  04/25/25    Resolved:  03/31/25         Patient will report 50% overall perceived improvement  (Met)       Start:  03/16/25    Expected End:  04/25/25    Resolved:  03/31/25         Patient will demonstrate independence and compliance with HEP  (Met)       Start:  03/16/25    Expected End:  04/25/25    Resolved:  03/31/25             Chandler Castellon PT

## 2025-04-23 ENCOUNTER — CLINICAL SUPPORT (OUTPATIENT)
Dept: REHABILITATION | Facility: HOSPITAL | Age: 60
End: 2025-04-23
Payer: COMMERCIAL

## 2025-04-23 DIAGNOSIS — M48.02 SPINAL STENOSIS IN CERVICAL REGION: ICD-10-CM

## 2025-04-23 DIAGNOSIS — Z98.1 S/P CERVICAL SPINAL FUSION: ICD-10-CM

## 2025-04-23 DIAGNOSIS — M54.2 NECK PAIN: Primary | ICD-10-CM

## 2025-04-23 PROCEDURE — 97110 THERAPEUTIC EXERCISES: CPT

## 2025-04-23 PROCEDURE — 97140 MANUAL THERAPY 1/> REGIONS: CPT

## 2025-04-23 PROCEDURE — 97014 ELECTRIC STIMULATION THERAPY: CPT

## 2025-04-23 NOTE — PROGRESS NOTES
Outpatient Rehab    Physical Therapy Visit  Dry Needling Only    Patient Name: Rinku Herzog  MRN: [id number]  YOB: 1965  Encounter Date: 4/23/2025    Therapy Diagnosis:   Encounter Diagnoses   Name Primary?    Neck pain Yes    S/P cervical spinal fusion     Spinal stenosis in cervical region      Physician: Omar Lira, *    Physician Orders: Eval and Treat  Medical Diagnosis: Spinal stenosis in cervical region    Visit # / Visits Authorized:  19 / 50  Insurance Authorization Period: 3/12/2025 to 3/12/2026    Time In: 0745   Time Out: 0810  Total Time: 25        Subjective   Pt requests dry needling. PT agreed to treat pt following normally scheduled visit with supervising PT. Pt states he experienced 1-2 days of relief following last DN session..         Objective            Treatment:  Other Activities  Activity 1: DN set up, performance, and removal  Activity 2: IFES added to DN  Activity 3: prone: .30x50 to GUS UT, .30x30 to cervical paraspinals, .18x15 to R SCM and R SO    Time Entry(in minutes):  E-Stim (Unattended) Time Entry: 15  Manual Therapy Time Entry: 25  Number of Muscles Needled: 6  Needle Insertions Time Entry: 25    Assessment & Plan   Assessment: Pt provided verbal and written consent for dry needling this date. Pt tolerated dry needling the right neck and upper traps well without adverse response. This session of dry needling took place following his traditional PT visit with supervising PT.  Evaluation/Treatment Tolerance: Patient tolerated treatment well    Patient will continue to benefit from skilled outpatient physical therapy to address the deficits listed in the problem list box on initial evaluation, provide pt/family education and to maximize pt's level of independence in the home and community environment.     Patient's spiritual, cultural, and educational needs considered and patient agreeable to plan of care and goals.             Plan: Continue with the plan  of care. Will advance as tolerated.    Goals:   Active       LTG       Patient will report at least 20% disability reduction from initial NDI score to indicate clinically significant functional improvement   (Progressing)       Start:  03/16/25    Expected End:  06/06/25            Patient will report at least 30 point increase on initial FOTO score to indicate clinically significant functional improvement   (Progressing)       Start:  03/16/25    Expected End:  06/06/25            Patient will report 80% overall perceived improvement  (Progressing)       Start:  03/16/25    Expected End:  06/06/25            Patient will demonstrate independence and compliance with final HEP  (Progressing)       Start:  03/16/25    Expected End:  06/06/25              Resolved       STG       Patient will report at least 10% disability reduction from initial NDI score to indicate clinically significant functional improvement   (Met)       Start:  03/16/25    Expected End:  04/25/25    Resolved:  03/31/25         Patient will report at least 10 pt increase on initial FOTO score to indicate clinically significant functional improvement   (Met)       Start:  03/16/25    Expected End:  04/25/25    Resolved:  03/31/25         Patient will report 50% overall perceived improvement  (Met)       Start:  03/16/25    Expected End:  04/25/25    Resolved:  03/31/25         Patient will demonstrate independence and compliance with HEP  (Met)       Start:  03/16/25    Expected End:  04/25/25    Resolved:  03/31/25             Pio Coburn, PT

## 2025-04-23 NOTE — PROGRESS NOTES
Outpatient Rehab    Physical Therapy Visit      Patient Name: Rinku Herzog  MRN: [id number]  YOB: 1965  Encounter Date: 4/23/2025    Therapy Diagnosis:   Encounter Diagnoses   Name Primary?    Neck pain Yes    S/P cervical spinal fusion     Spinal stenosis in cervical region      Physician: Omar Lira, *    Physician Orders: Eval and Treat  Medical Diagnosis: Spinal stenosis in cervical region    Visit # / Visits Authorized:  19 / 50  Insurance Authorization Period: 3/12/2025 to 3/12/2026    Time In: 0645   Time Out: 0740  Total Time: 55        Subjective   Rinku got the results of his MRI. States there is impingement at C4-C5. Will be getting with his doctor. Requesting some dry needling today..  Pain reported as 2/10.      Objective     Cervical Range of Motion    Active (deg) Passive (deg) Pain   Flexion 40  (avoided overpressure due to surgical restrictions)     Extension 35       Right Lateral Flexion 30  (avoided overpressure due to surgical restrictions)     Right Rotation 55  (avoided overpressure due to surgical restrictions)     Left Lateral Flexion 25  (avoided overpressure due to surgical restrictions)     Left Rotation 65  (avoided overpressure due to surgical restrictions)                 Treatment:  (Minutes): 70  Manual Therapy Time Entry: 15  Therapeutic Exercise Time Entry: 40    Assessment & Plan   Assessment: Continued with the plan of care. Rinku continues to have right cervical and upper shoulder pain. It is not constant and he seems to do best when he is active. Prolong sitting makes him hurt. The pain at times can reach 7/10 but this is not often. Per patient from results of his MRI there is impingement at C4-C5. His cervical ROM is much improved and remains most limited with R rotation. Has not had any numbness or tingling into the arm as before surgery.  Evaluation/Treatment Tolerance: Patient tolerated treatment well    Patient will continue to benefit from  skilled outpatient physical therapy to address the deficits listed in the problem list box on initial evaluation, provide pt/family education and to maximize pt's level of independence in the home and community environment.     Patient's spiritual, cultural, and educational needs considered and patient agreeable to plan of care and goals.        Plan: Continue with the plan of care. Will await the discussion with his doctor regarding the results of his MRI.    Goals:   Active       LTG       Patient will report at least 20% disability reduction from initial NDI score to indicate clinically significant functional improvement   (Progressing)       Start:  03/16/25    Expected End:  06/06/25            Patient will report at least 30 point increase on initial FOTO score to indicate clinically significant functional improvement   (Progressing)       Start:  03/16/25    Expected End:  06/06/25            Patient will report 80% overall perceived improvement  (Progressing)       Start:  03/16/25    Expected End:  06/06/25            Patient will demonstrate independence and compliance with final HEP  (Progressing)       Start:  03/16/25    Expected End:  06/06/25              Resolved       STG       Patient will report at least 10% disability reduction from initial NDI score to indicate clinically significant functional improvement   (Met)       Start:  03/16/25    Expected End:  04/25/25    Resolved:  03/31/25         Patient will report at least 10 pt increase on initial FOTO score to indicate clinically significant functional improvement   (Met)       Start:  03/16/25    Expected End:  04/25/25    Resolved:  03/31/25         Patient will report 50% overall perceived improvement  (Met)       Start:  03/16/25    Expected End:  04/25/25    Resolved:  03/31/25         Patient will demonstrate independence and compliance with HEP  (Met)       Start:  03/16/25    Expected End:  04/25/25    Resolved:  03/31/25              Chandler Castellon, PT

## 2025-04-25 ENCOUNTER — CLINICAL SUPPORT (OUTPATIENT)
Dept: REHABILITATION | Facility: HOSPITAL | Age: 60
End: 2025-04-25
Payer: COMMERCIAL

## 2025-04-25 DIAGNOSIS — M54.2 NECK PAIN: Primary | ICD-10-CM

## 2025-04-25 DIAGNOSIS — M48.02 SPINAL STENOSIS IN CERVICAL REGION: ICD-10-CM

## 2025-04-25 DIAGNOSIS — Z98.1 S/P CERVICAL SPINAL FUSION: ICD-10-CM

## 2025-04-25 PROCEDURE — 97014 ELECTRIC STIMULATION THERAPY: CPT

## 2025-04-25 PROCEDURE — 97110 THERAPEUTIC EXERCISES: CPT

## 2025-04-25 PROCEDURE — 97140 MANUAL THERAPY 1/> REGIONS: CPT

## 2025-04-25 NOTE — PROGRESS NOTES
Outpatient Rehab    Physical Therapy Visit  Dry Needling Only    Patient Name: Rinku Herzog  MRN: [id number]  YOB: 1965  Encounter Date: 4/25/2025    Therapy Diagnosis:   Encounter Diagnoses   Name Primary?    Neck pain Yes    S/P cervical spinal fusion     Spinal stenosis in cervical region      Physician: Omar Lira, *    Physician Orders: Eval and Treat  Medical Diagnosis: Spinal stenosis in cervical region    Visit # / Visits Authorized:  21 / 50  Insurance Authorization Period: 3/12/2025 to 3/12/2026    Time In: 0800   Time Out: 0825  Total Time: 25        Subjective   Pt requests dry needling. PT agreed to treat pt following normally scheduled visit with supervising PT. Pt states he experienced 1-2 days of relief following last DN session..         Objective            Treatment:  Other Activities  Activity 1: DN set up, performance, and removal  Activity 2: IFES added to DN  Activity 3: prone: .30x50 to GUS UT, .30x30 to cervical paraspinals, .18x15 to R SCM and R SO    Time Entry(in minutes):  E-Stim (Unattended) Time Entry: 10  Manual Therapy Time Entry: 25    Assessment & Plan   Assessment: Pt provided verbal consent for dry needling this date. Pt tolerated dry needling the right neck and upper traps well without adverse response. This session of dry needling took place following his traditional PT visit with supervising PT.  Evaluation/Treatment Tolerance: Patient tolerated treatment well    Patient will continue to benefit from skilled outpatient physical therapy to address the deficits listed in the problem list box on initial evaluation, provide pt/family education and to maximize pt's level of independence in the home and community environment.     Patient's spiritual, cultural, and educational needs considered and patient agreeable to plan of care and goals.             Plan: Continue POC and progress as indicated toward set goals.    Goals:   Active       LTG        Patient will report at least 20% disability reduction from initial NDI score to indicate clinically significant functional improvement   (Progressing)       Start:  03/16/25    Expected End:  06/06/25            Patient will report at least 30 point increase on initial FOTO score to indicate clinically significant functional improvement   (Progressing)       Start:  03/16/25    Expected End:  06/06/25            Patient will report 80% overall perceived improvement  (Progressing)       Start:  03/16/25    Expected End:  06/06/25            Patient will demonstrate independence and compliance with final HEP  (Progressing)       Start:  03/16/25    Expected End:  06/06/25              Resolved       STG       Patient will report at least 10% disability reduction from initial NDI score to indicate clinically significant functional improvement   (Met)       Start:  03/16/25    Expected End:  04/25/25    Resolved:  03/31/25         Patient will report at least 10 pt increase on initial FOTO score to indicate clinically significant functional improvement   (Met)       Start:  03/16/25    Expected End:  04/25/25    Resolved:  03/31/25         Patient will report 50% overall perceived improvement  (Met)       Start:  03/16/25    Expected End:  04/25/25    Resolved:  03/31/25         Patient will demonstrate independence and compliance with HEP  (Met)       Start:  03/16/25    Expected End:  04/25/25    Resolved:  03/31/25             Pio Coburn, PT

## 2025-04-25 NOTE — PROGRESS NOTES
Outpatient Rehab    Physical Therapy Visit  Dry Needling Only    Patient Name: Rinku Herzog  MRN: [id number]  YOB: 1965  Encounter Date: 4/25/2025    Therapy Diagnosis:   Encounter Diagnoses   Name Primary?    Neck pain Yes    S/P cervical spinal fusion     Spinal stenosis in cervical region      Physician: Omar Lira, *    Physician Orders: Eval and Treat  Medical Diagnosis: Spinal stenosis in cervical region    Visit # / Visits Authorized:  21 / 50  Insurance Authorization Period: 3/12/2025 to 3/12/2026    Time In: 0645   Time Out: 0750  Total Time: 65        Therapist reviewed FOTO scores for Rinku Herzog on 3/13/2025.   FOTO report - see Media section or FOTO account episode details.      Intake: Patient's Physical FS Primary Measure: 22 (goal is 56 @ visit #14)  Intake: Risk Adjusted Statistical FOTO: 51  Intake: Limitation Score: 78%  Intake: Category: Carrying  Intake: NDI:  78.0% (higher score greater disability)      3/28/2025: Patient's Physical FS Primary Measure: 54 (goal is 56 @ visit #14)  Intake: Risk Adjusted Statistical FOTO: 51  3/28/2025: Limitation Score: 46%  3/28/2025: Category: Carrying  3/28/2025: NDI:  30.0% (higher score greater disability)     4/25/2025: Patient's Physical FS Primary Measure: 60 (goal is 56 @ visit #14)  Intake: Risk Adjusted Statistical FOTO: 51  4/25/2025:  Limitation Score: 40%  4/25/2025: Category: Carrying  4/25/2025:   NDI:  22.0% (higher score greater disability)      Intake Score: 22 (predicted 56 by visit #14)%  Survey Score 2: 54 (predicted 56 by visit #14)%  Survey Score 3: 60 (predicted 56 by visit #14)%      Subjective   Rinku reports that he is getting some good relief from the Dry Needling and is pleased with the response. Physician's PA called regarding results of the MRI and there are plans for JENNY and later on if needed EMG..  Pain reported as 1/10.      Objective      Cervical Range of Motion    Active (deg) Passive (deg) Pain    Flexion 40  (avoided overpressure due to surgical restrictions)     Extension 35       Right Lateral Flexion 30  (avoided overpressure due to surgical restrictions)     Right Rotation 55  (avoided overpressure due to surgical restrictions)     Left Lateral Flexion 25  (avoided overpressure due to surgical restrictions)     Left Rotation 65  (avoided overpressure due to surgical restrictions)                     Treatment:  Therapeutic Exercise  TE 1: 40 mins; Chin tucks, scapula retractions, scap elevations, front headlifts, open book R, R lateral head lifts, open book L, L lateral head lifts, prone head lifts, prone superman, prone W's, cat/camel, banded rows, banded ext, banded ER, banded IR, horizontal abd, bilateral ER, wall clock, serratus wall slides, NuStep  Manual Therapy  MT 1: 15 mins; PA's at thoracic spinous processes, DTM at medial scap and right upper trap, cervical isometrics; gentle passive neck stretch, manual traction/distraction  Modalities  Electrical Stimulation (Parameters): 10 mins; IFC e-stim with moist heat post ex    Time Entry(in minutes): 65  E-Stim (Unattended) Time Entry: 10  Manual Therapy Time Entry: 15  Therapeutic Exercise Time Entry: 40    Assessment & Plan   Assessment: Continued with the plan of care. Rinku is making some progress evident by his score today onthe functional outcome tool. He has gone from 78% limitation to 40 %. He continues to have right cervical and upper shoulder pain but is improving.He is respnding to dry needling. His pain is no longer constant anddoes best when he is active. Prolong sitting makes him hurt. Per patient from results of his MRI there is impingement at C4-C5. Is scheduled for JENNY. His cervical ROM is much improved and remains most limited with R rotation. Has not had any numbness or tingling into the arm as before surgery.  Evaluation/Treatment Tolerance: Patient tolerated treatment well    Patient will continue to benefit from skilled  outpatient physical therapy to address the deficits listed in the problem list box on initial evaluation, provide pt/family education and to maximize pt's level of independence in the home and community environment.     Patient's spiritual, cultural, and educational needs considered and patient agreeable to plan of care and goals.             Plan: Continue with the plan of care. Will await the discussion with his doctor regarding the results of his MRI.    Goals:   Active       LTG       Patient will report at least 20% disability reduction from initial NDI score to indicate clinically significant functional improvement   (Progressing)       Start:  03/16/25    Expected End:  06/06/25            Patient will report at least 30 point increase on initial FOTO score to indicate clinically significant functional improvement   (Progressing)       Start:  03/16/25    Expected End:  06/06/25            Patient will report 80% overall perceived improvement  (Progressing)       Start:  03/16/25    Expected End:  06/06/25            Patient will demonstrate independence and compliance with final HEP  (Progressing)       Start:  03/16/25    Expected End:  06/06/25              Resolved       STG       Patient will report at least 10% disability reduction from initial NDI score to indicate clinically significant functional improvement   (Met)       Start:  03/16/25    Expected End:  04/25/25    Resolved:  03/31/25         Patient will report at least 10 pt increase on initial FOTO score to indicate clinically significant functional improvement   (Met)       Start:  03/16/25    Expected End:  04/25/25    Resolved:  03/31/25         Patient will report 50% overall perceived improvement  (Met)       Start:  03/16/25    Expected End:  04/25/25    Resolved:  03/31/25         Patient will demonstrate independence and compliance with HEP  (Met)       Start:  03/16/25    Expected End:  04/25/25    Resolved:  03/31/25             Chandler  Ravinder, PT

## 2025-04-28 ENCOUNTER — CLINICAL SUPPORT (OUTPATIENT)
Dept: REHABILITATION | Facility: HOSPITAL | Age: 60
End: 2025-04-28
Payer: COMMERCIAL

## 2025-04-28 DIAGNOSIS — M48.02 SPINAL STENOSIS IN CERVICAL REGION: ICD-10-CM

## 2025-04-28 DIAGNOSIS — M54.2 NECK PAIN: Primary | ICD-10-CM

## 2025-04-28 DIAGNOSIS — Z98.1 S/P CERVICAL SPINAL FUSION: ICD-10-CM

## 2025-04-28 PROCEDURE — 97014 ELECTRIC STIMULATION THERAPY: CPT

## 2025-04-28 PROCEDURE — 97140 MANUAL THERAPY 1/> REGIONS: CPT

## 2025-04-28 PROCEDURE — 97110 THERAPEUTIC EXERCISES: CPT

## 2025-04-28 NOTE — PROGRESS NOTES
Outpatient Rehab    Physical Therapy Visit  Dry Needling Only    Patient Name: Rinku Herzog  MRN: [id number]  YOB: 1965  Encounter Date: 4/28/2025    Therapy Diagnosis:   Encounter Diagnoses   Name Primary?    Neck pain Yes    S/P cervical spinal fusion     Spinal stenosis in cervical region      Physician: Omar Lira, *    Physician Orders: Eval and Treat  Medical Diagnosis: Spinal stenosis in cervical region    Visit # / Visits Authorized:  22 / 50  Insurance Authorization Period: 3/12/2025 to 3/12/2026    Time In: 0635   Time Out: 0745  Total Time: 70        Subjective   Rinku reports that he is doing pretty well. States that he has figured out that he cannot sit for extended periods as this is what makes him hurt. Sat peeling crawfish over the weekend and began to hurt. Went cut his grass and the pain eased. To see Dr Mehul tavarez week to schedule JENNY..  Pain reported as 1/10.      Objective      Cervical Range of Motion    Active (deg) Passive (deg) Pain   Flexion 40  (avoided overpressure due to surgical restrictions)     Extension 35       Right Lateral Flexion 30  (avoided overpressure due to surgical restrictions)     Right Rotation 55  (avoided overpressure due to surgical restrictions)     Left Lateral Flexion 25  (avoided overpressure due to surgical restrictions)     Left Rotation 65  (avoided overpressure due to surgical restrictions)                    Treatment:  Therapeutic Exercise  TE 1: 40 mins; Chin tucks, scapula retractions, scap elevations, front headlifts, open book R, R lateral head lifts, open book L, L lateral head lifts, prone head lifts, prone superman, prone W's, cat/camel, banded rows, banded ext, banded ER, banded IR, horizontal abd, bilateral ER, wall clock, serratus wall slides, NuStep  Manual Therapy  MT 1: 15 mins; PA's at thoracic spinous processes, DTM at medial scap and right upper trap, cervical isometrics; gentle passive neck stretch, manual  traction/distraction  Modalities  Electrical Stimulation (Parameters): 15 mins; IFC e-stim with moist heat post ex    Time Entry(in minutes):  E-Stim (Unattended) Time Entry: 15  Manual Therapy Time Entry: 15  Therapeutic Exercise Time Entry: 40    Assessment & Plan   Assessment: Continued with the plan of care. Rinku is making some progress evident by his score today onthe functional outcome tool. He has gone from 78% limitation to 40 %. He continues to have right cervical and upper shoulder pain but is improving.He is respnding to dry needling. His pain is no longer constant anddoes best when he is active. Prolong sitting makes him hurt. Per patient from results of his MRI there is impingement at C4-C5. Is scheduled for JENNY. His cervical ROM is much improved and remains most limited with R rotation. Has not had any numbness or tingling into the arm as before surgery.  Evaluation/Treatment Tolerance: Patient tolerated treatment well    Patient will continue to benefit from skilled outpatient physical therapy to address the deficits listed in the problem list box on initial evaluation, provide pt/family education and to maximize pt's level of independence in the home and community environment.     Patient's spiritual, cultural, and educational needs considered and patient agreeable to plan of care and goals.             Plan: Continue POC and progress as indicated toward set goals.    Goals:   Active       LTG       Patient will report at least 20% disability reduction from initial NDI score to indicate clinically significant functional improvement   (Progressing)       Start:  03/16/25    Expected End:  06/06/25            Patient will report at least 30 point increase on initial FOTO score to indicate clinically significant functional improvement   (Progressing)       Start:  03/16/25    Expected End:  06/06/25            Patient will report 80% overall perceived improvement  (Progressing)       Start:  03/16/25     Expected End:  06/06/25            Patient will demonstrate independence and compliance with final HEP  (Progressing)       Start:  03/16/25    Expected End:  06/06/25              Resolved       STG       Patient will report at least 10% disability reduction from initial NDI score to indicate clinically significant functional improvement   (Met)       Start:  03/16/25    Expected End:  04/25/25    Resolved:  03/31/25         Patient will report at least 10 pt increase on initial FOTO score to indicate clinically significant functional improvement   (Met)       Start:  03/16/25    Expected End:  04/25/25    Resolved:  03/31/25         Patient will report 50% overall perceived improvement  (Met)       Start:  03/16/25    Expected End:  04/25/25    Resolved:  03/31/25         Patient will demonstrate independence and compliance with HEP  (Met)       Start:  03/16/25    Expected End:  04/25/25    Resolved:  03/31/25             Chandler Castellon PT

## 2025-04-30 ENCOUNTER — CLINICAL SUPPORT (OUTPATIENT)
Dept: REHABILITATION | Facility: HOSPITAL | Age: 60
End: 2025-04-30
Payer: COMMERCIAL

## 2025-04-30 DIAGNOSIS — M54.2 NECK PAIN: Primary | ICD-10-CM

## 2025-04-30 DIAGNOSIS — Z98.1 S/P CERVICAL SPINAL FUSION: ICD-10-CM

## 2025-04-30 DIAGNOSIS — M48.02 SPINAL STENOSIS IN CERVICAL REGION: ICD-10-CM

## 2025-04-30 PROCEDURE — 97014 ELECTRIC STIMULATION THERAPY: CPT

## 2025-04-30 PROCEDURE — 97110 THERAPEUTIC EXERCISES: CPT

## 2025-04-30 PROCEDURE — 97140 MANUAL THERAPY 1/> REGIONS: CPT

## 2025-04-30 NOTE — PROGRESS NOTES
Outpatient Rehab    Physical Therapy Visit  Dry Needling Only    Patient Name: Rinku Herzog  MRN: [id number]  YOB: 1965  Encounter Date: 4/30/2025    Therapy Diagnosis:   Encounter Diagnoses   Name Primary?    Neck pain Yes    S/P cervical spinal fusion     Spinal stenosis in cervical region      Physician: Omar Lira, *    Physician Orders: Eval and Treat  Medical Diagnosis: Spinal stenosis in cervical region    Visit # / Visits Authorized:  24 / 50  Insurance Authorization Period: 3/12/2025 to 3/12/2026    Time In: 0740   Time Out: 0805  Total Time: 25        Subjective   Pt requests dry needling. PT agreed to treat pt following normally scheduled visit with supervising PT. Pt states he gets 2-3 days of relief post-DN session..         Objective            Treatment:  Other Activities  Activity 1: DN set up, performance, and removal  Activity 2: IFES added to DN  Activity 3: prone: .30x50 to GUS UT, .30x30 to cervical paraspinals, .30x30 to R SCM, R LS, and R SO    Time Entry(in minutes):  E-Stim (Unattended) Time Entry: 10  Manual Therapy Time Entry: 25  Number of Muscles Needled: 6  Needle Insertions Time Entry: 25    Assessment & Plan   Assessment: Pt provided verbal consent for dry needling this date. Pt tolerated dry needling the right neck and upper traps well without adverse response. This session of dry needling took place following his traditional PT visit with supervising PT.  Evaluation/Treatment Tolerance: Patient tolerated treatment well    Patient will continue to benefit from skilled outpatient physical therapy to address the deficits listed in the problem list box on initial evaluation, provide pt/family education and to maximize pt's level of independence in the home and community environment.     Patient's spiritual, cultural, and educational needs considered and patient agreeable to plan of care and goals.             Plan: Continue POC and progress as indicated  toward set goals.    Goals:   Active       LTG       Patient will report at least 20% disability reduction from initial NDI score to indicate clinically significant functional improvement   (Progressing)       Start:  03/16/25    Expected End:  06/06/25            Patient will report at least 30 point increase on initial FOTO score to indicate clinically significant functional improvement   (Progressing)       Start:  03/16/25    Expected End:  06/06/25            Patient will report 80% overall perceived improvement  (Progressing)       Start:  03/16/25    Expected End:  06/06/25            Patient will demonstrate independence and compliance with final HEP  (Progressing)       Start:  03/16/25    Expected End:  06/06/25              Resolved       STG       Patient will report at least 10% disability reduction from initial NDI score to indicate clinically significant functional improvement   (Met)       Start:  03/16/25    Expected End:  04/25/25    Resolved:  03/31/25         Patient will report at least 10 pt increase on initial FOTO score to indicate clinically significant functional improvement   (Met)       Start:  03/16/25    Expected End:  04/25/25    Resolved:  03/31/25         Patient will report 50% overall perceived improvement  (Met)       Start:  03/16/25    Expected End:  04/25/25    Resolved:  03/31/25         Patient will demonstrate independence and compliance with HEP  (Met)       Start:  03/16/25    Expected End:  04/25/25    Resolved:  03/31/25             Pio Coburn, PT

## 2025-04-30 NOTE — PROGRESS NOTES
Outpatient Rehab    Physical Therapy Visit  Dry Needling Only    Patient Name: Rinku Herzog  MRN: [id number]  YOB: 1965  Encounter Date: 4/30/2025    Therapy Diagnosis:   Encounter Diagnoses   Name Primary?    Neck pain Yes    S/P cervical spinal fusion     Spinal stenosis in cervical region      Physician: Omar Lira, *    Physician Orders: Eval and Treat  Medical Diagnosis: Spinal stenosis in cervical region    Visit # / Visits Authorized:  24 / 50  Insurance Authorization Period: 3/12/2025 to 3/12/2026    Time In: 0644   Time Out: 0739  Total Time: 55      Therapist reviewed FOTO scores for Rinku Herzog on 3/13/2025.   FOTO report - see Media section or FOTO account episode details.      Intake: Patient's Physical FS Primary Measure: 22 (goal is 56 @ visit #14)  Intake: Risk Adjusted Statistical FOTO: 51  Intake: Limitation Score: 78%  Intake: Category: Carrying  Intake: NDI:  78.0% (higher score greater disability)      3/28/2025: Patient's Physical FS Primary Measure: 54 (goal is 56 @ visit #14)  Intake: Risk Adjusted Statistical FOTO: 51  3/28/2025: Limitation Score: 46%  3/28/2025: Category: Carrying  3/28/2025: NDI:  30.0% (higher score greater disability)      4/25/2025: Patient's Physical FS Primary Measure: 60 (goal is 56 @ visit #14)  Intake: Risk Adjusted Statistical FOTO: 51  4/25/2025:  Limitation Score: 40%  4/25/2025: Category: Carrying  4/25/2025:   NDI:  22.0% (higher score greater disability)      Intake Score: 22 (predicted 56 by visit #14)%  Survey Score 2: 54 (predicted 56 by visit #14)%  Survey Score 3: 60 (predicted 56 by visit #14)%    Subjective   Rinku reports doing pretty well. Some R upper trap pain along the shoulder with side bending to that side. Has an infected tooth that has been giving ear ache. On antibiotics..  Pain reported as 1/10.      Objective      Cervical Range of Motion    Active (deg) Passive (deg) Pain   Flexion 40  (avoided overpressure due  to surgical restrictions)     Extension 35       Right Lateral Flexion 30  (avoided overpressure due to surgical restrictions)     Right Rotation 55  (avoided overpressure due to surgical restrictions)     Left Lateral Flexion 25  (avoided overpressure due to surgical restrictions)     Left Rotation 65  (avoided overpressure due to surgical restrictions)                    Treatment:  Therapeutic Exercise  TE 1: 40 mins; Chin tucks, scapula retractions, scap elevations, front headlifts, open book R, R lateral head lifts, open book L, L lateral head lifts, prone head lifts, prone superman, prone W's, cat/camel, banded rows, banded ext, banded ER, banded IR, horizontal abd, bilateral ER, wall clock, serratus wall slides, NuStep  Manual Therapy  MT 1: 15 mins; PA's at thoracic spinous processes, DTM at medial scap and right upper trap, cervical isometrics; gentle passive neck stretch, manual traction/distraction      Time Entry(in minutes): 55  Manual Therapy Time Entry: 15  Therapeutic Exercise Time Entry: 40    Assessment & Plan   Assessment: Continued with the plan of care. Rinku is making some progress evident by his score today onthe functional outcome tool. He has gone from 78% limitation to 40 %. He continues to have right cervical and upper shoulder pain but is improving. He is responding to dry needling. His pain is no longer constant and he does best when he is active. Prolong sitting makes him hurt. Per patient from results of his MRI there is impingement at C4-C5. Is scheduled for JENNY. His cervical ROM is much improved and remains most limited with R rotation. Has not had any numbness or tingling into the arm as before surgery.  Evaluation/Treatment Tolerance: Patient tolerated treatment well    Patient will continue to benefit from skilled outpatient physical therapy to address the deficits listed in the problem list box on initial evaluation, provide pt/family education and to maximize pt's level of  independence in the home and community environment.     Patient's spiritual, cultural, and educational needs considered and patient agreeable to plan of care and goals.           Plan: Continue POC and progress as indicated toward set goals.    Goals:   Active       LTG       Patient will report at least 20% disability reduction from initial NDI score to indicate clinically significant functional improvement   (Progressing)       Start:  03/16/25    Expected End:  06/06/25            Patient will report at least 30 point increase on initial FOTO score to indicate clinically significant functional improvement   (Progressing)       Start:  03/16/25    Expected End:  06/06/25            Patient will report 80% overall perceived improvement  (Progressing)       Start:  03/16/25    Expected End:  06/06/25            Patient will demonstrate independence and compliance with final HEP  (Progressing)       Start:  03/16/25    Expected End:  06/06/25              Resolved       STG       Patient will report at least 10% disability reduction from initial NDI score to indicate clinically significant functional improvement   (Met)       Start:  03/16/25    Expected End:  04/25/25    Resolved:  03/31/25         Patient will report at least 10 pt increase on initial FOTO score to indicate clinically significant functional improvement   (Met)       Start:  03/16/25    Expected End:  04/25/25    Resolved:  03/31/25         Patient will report 50% overall perceived improvement  (Met)       Start:  03/16/25    Expected End:  04/25/25    Resolved:  03/31/25         Patient will demonstrate independence and compliance with HEP  (Met)       Start:  03/16/25    Expected End:  04/25/25    Resolved:  03/31/25             Chandler Castellon PT

## 2025-05-02 ENCOUNTER — CLINICAL SUPPORT (OUTPATIENT)
Dept: REHABILITATION | Facility: HOSPITAL | Age: 60
End: 2025-05-02
Payer: COMMERCIAL

## 2025-05-02 DIAGNOSIS — M48.02 SPINAL STENOSIS IN CERVICAL REGION: ICD-10-CM

## 2025-05-02 DIAGNOSIS — Z98.1 S/P CERVICAL SPINAL FUSION: ICD-10-CM

## 2025-05-02 DIAGNOSIS — M54.2 NECK PAIN: Primary | ICD-10-CM

## 2025-05-02 PROCEDURE — 97140 MANUAL THERAPY 1/> REGIONS: CPT

## 2025-05-02 PROCEDURE — 97014 ELECTRIC STIMULATION THERAPY: CPT

## 2025-05-02 PROCEDURE — 97110 THERAPEUTIC EXERCISES: CPT

## 2025-05-02 NOTE — PLAN OF CARE
"  Physical Therapy Treatment Note     Name: Rinku Herzog  Clinic Number: [id number]    Therapy Diagnosis:   No diagnosis found.    Physician: Virgil Lujan MD    Visit Date: 12/27/2022    Physician Orders: PT Eval and Treat  Medical Diagnosis from Referral: Achilles Tendonitis, Left leg/ankle  Evaluation Date: 8/23/2022  Authorization Period Expiration: 11/23/2022  Plan of Care Expiration: 11/23/2022  Visit # / Visits authorized: 14/88     Time In: 0747  Time Out: 0842  Total Billable Time:  55 minutes    Surgery: none  Orthopedic Precautions: none  Pertinent History: Back Surgery, HTN,     Subjective     Rinku reports that the heel is doing  well.ok but did a lot of walking and standing over the holiday and is a little more sore but "remains so much better". He agrees to PT session.    Pain: 2-3/10  Location: Left Achilles Tendon at lateral attachment    Outcome Measure:  Eval: LEFS 54/80 = 32.5% disability, 67.5% functional  9/19/2022: LEFS 66/80 = 17.5% disability, 82.5% functional  Objective     Rinku received the following treatment:     Time Activities   Manual 25 Mobilizations and distractions of the calcaneus and subtalar joint as well as forefoot; STM cross frictional and longitudinal lower leg and foot; PROM   TherAct     TherEx 15 Peroneal ankle eversions; static squat with heel raises, weighted seated heel raises, single leg heel raises, eccentric heel on stairs   Gait     Neuro Re-ed     Modalities     E-Stim     Dry Needling 15 Dry needling with e-stim to the left achilles   Canalith Repositioning           Home Exercises Provided and Patient Education Provided     Education provided:   - Plan of care, HEP reviewed     Written Home Exercises Provided: yes.  Exercises were reviewed and Rinku was able to demonstrate them prior to the end of the session. Rinku demonstrated good  understanding of the education provided.     Assessment     Rinku is a 56 y.o. male referred to outpatient Physical Therapy " with a medical diagnosis of Achilles Tendonitis, Left leg/ankle.     Rinku continues to respond well to dry needling with intramuscular stimulation. His pain level a little increased today but overall holds steady even with increased activity. ROM is improved with ankle eversion and he is demonstrating better peroneal strength.  No noticeable deviations with his gait today.     Rinku received dry needling with intramuscular stimualtion performed by one of our trained therapist. Will continue with the plan of care.    Patient prognosis is good     Anticipated barriers to physical therapy: none    Goals: Rinku is progressing well towards his goals.    Plan     Plan of care Certification: 8/23/2022 to 11/23/2022     Outpatient Physical Therapy 2-3 times weekly for 12 weeks to include the following interventions: Gait Training, Manual Therapy, Moist Heat/ Ice, Neuromuscular Re-ed, Patient Education, Self Care, Therapeutic Activities and Therapeutic Exercise.     Chandler Castellon, PT    Quality 226: Preventive Care And Screening: Tobacco Use: Screening And Cessation Intervention: Patient screened for tobacco use and is an ex/non-smoker Quality 130: Documentation Of Current Medications In The Medical Record: Current Medications Documented Quality 431: Preventive Care And Screening: Unhealthy Alcohol Use - Screening: Patient did not receive brief counseling if identified as an unhealthy alcohol user, reason not given Detail Level: Detailed

## 2025-05-02 NOTE — PROGRESS NOTES
Outpatient Rehab    Physical Therapy Visit  Dry Needling Only    Patient Name: Rinku Herzog  MRN: [id number]  YOB: 1965  Encounter Date: 5/2/2025    Therapy Diagnosis:   Encounter Diagnoses   Name Primary?    Neck pain Yes    S/P cervical spinal fusion     Spinal stenosis in cervical region      Physician: Omar Lira, *    Physician Orders: Eval and Treat  Medical Diagnosis: Spinal stenosis in cervical region    Visit # / Visits Authorized:  26 / 50  Insurance Authorization Period: 3/12/2025 to 3/12/2026    Time In: 0740   Time Out: 0805  Total Time: 25        Subjective   Pt requests dry needling. PT agreed to treat pt following normally scheduled visit with supervising PT. Pt states he gets 2-3 days of relief post-DN session..         Objective            Treatment:  Other Activities  Activity 1: DN set up, performance, and removal  Activity 2: IFES added to DN  Activity 3: prone: .30x50 to GUS UT, .30x30 to cervical paraspinals, .30x30 to R SCM, R LS, and R SO    Time Entry(in minutes):  E-Stim (Unattended) Time Entry: 10  Number of Muscles Needled: 6  Needle Insertions Time Entry: 25    Assessment & Plan   Assessment: Pt provided verbal consent for dry needling this date. Pt tolerated dry needling the right neck and upper traps well without adverse response. This session of dry needling took place following his traditional PT visit with supervising PT. Planning to skip DN next week as his muscle guarding has greatly improved; pt agreed.  Evaluation/Treatment Tolerance: Patient tolerated treatment well    Patient will continue to benefit from skilled outpatient physical therapy to address the deficits listed in the problem list box on initial evaluation, provide pt/family education and to maximize pt's level of independence in the home and community environment.     Patient's spiritual, cultural, and educational needs considered and patient agreeable to plan of care and goals.              Plan: Continue POC and progress as indicated toward set goals.    Goals:   Active       LTG       Patient will report at least 20% disability reduction from initial NDI score to indicate clinically significant functional improvement   (Progressing)       Start:  03/16/25    Expected End:  06/06/25            Patient will report at least 30 point increase on initial FOTO score to indicate clinically significant functional improvement   (Progressing)       Start:  03/16/25    Expected End:  06/06/25            Patient will report 80% overall perceived improvement  (Progressing)       Start:  03/16/25    Expected End:  06/06/25            Patient will demonstrate independence and compliance with final HEP  (Progressing)       Start:  03/16/25    Expected End:  06/06/25              Resolved       STG       Patient will report at least 10% disability reduction from initial NDI score to indicate clinically significant functional improvement   (Met)       Start:  03/16/25    Expected End:  04/25/25    Resolved:  03/31/25         Patient will report at least 10 pt increase on initial FOTO score to indicate clinically significant functional improvement   (Met)       Start:  03/16/25    Expected End:  04/25/25    Resolved:  03/31/25         Patient will report 50% overall perceived improvement  (Met)       Start:  03/16/25    Expected End:  04/25/25    Resolved:  03/31/25         Patient will demonstrate independence and compliance with HEP  (Met)       Start:  03/16/25    Expected End:  04/25/25    Resolved:  03/31/25             Pio Coburn, PT

## 2025-05-02 NOTE — PROGRESS NOTES
Outpatient Rehab    Physical Therapy Visit  Dry Needling Only    Patient Name: Rinku Herzog  MRN: [id number]  YOB: 1965  Encounter Date: 5/2/2025    Therapy Diagnosis:   Encounter Diagnoses   Name Primary?    Neck pain Yes    S/P cervical spinal fusion     Spinal stenosis in cervical region      Physician: Omar Lira, *    Physician Orders: Eval and Treat  Medical Diagnosis: Spinal stenosis in cervical region    Visit # / Visits Authorized:  26 / 50  Insurance Authorization Period: 3/12/2025 to 3/12/2026    Time In: 0645   Time Out: 0740  Total Time: 55        Subjective   Rinku saw the Neurology PA who went over his MRI. Will be receiving JENNY on the 13th. Yesterday reports that his symptoms were pretty hard with some pain in the right upper trap and neck area. Did not do anything just sitting around. Occasional numbness into his fingertips but barely noticeable..  Pain reported as 1/10.      Objective     Cervical Range of Motion    Active (deg) Passive (deg) Pain   Flexion 40  (avoided overpressure due to surgical restrictions)     Extension 35       Right Lateral Flexion 30  (avoided overpressure due to surgical restrictions)     Right Rotation 55  (avoided overpressure due to surgical restrictions)     Left Lateral Flexion 25  (avoided overpressure due to surgical restrictions)     Left Rotation 65  (avoided overpressure due to surgical restrictions)                    Treatment:  Therapeutic Exercise  TE 1: 40 mins; Chin tucks, scapula retractions, scap elevations, front headlifts, open book R, R lateral head lifts, open book L, L lateral head lifts, prone head lifts, prone superman, prone W's, cat/camel, banded rows, banded ext, banded ER, banded IR, horizontal abd, bilateral ER, wall clock, serratus wall slides, NuStep  Manual Therapy  MT 1: 15 mins; PA's at thoracic spinous processes, DTM at medial scap and right upper trap, cervical isometrics; gentle passive neck stretch, manual  traction/distraction      Time Entry(in minutes): 55  Manual Therapy Time Entry: 15  Therapeutic Exercise Time Entry: 40    Assessment & Plan   Assessment: Continued with the plan of care. Rinku is making some progress evident by his score today onthe functional outcome tool. He has gone from 78% limitation to 40 %. He continues to have right cervical and upper shoulder pain but is improving. He is responding to dry needling. His pain is no longer constant and he does best when he is active. Prolong sitting makes him hurt. No real trigger points since beginning dry needling. Occasional numbness into his right fingertips but barely noticeable. His cervical ROM is much improved and remains most limited with R rotation. Goes for JENNY on the 13th.  Evaluation/Treatment Tolerance: Patient tolerated treatment well    Patient will continue to benefit from skilled outpatient physical therapy to address the deficits listed in the problem list box on initial evaluation, provide pt/family education and to maximize pt's level of independence in the home and community environment.     Patient's spiritual, cultural, and educational needs considered and patient agreeable to plan of care and goals.             Plan: Continue POC and progress as indicated toward set goals.    Goals:   Active       LTG       Patient will report at least 20% disability reduction from initial NDI score to indicate clinically significant functional improvement   (Progressing)       Start:  03/16/25    Expected End:  06/06/25            Patient will report at least 30 point increase on initial FOTO score to indicate clinically significant functional improvement   (Progressing)       Start:  03/16/25    Expected End:  06/06/25            Patient will report 80% overall perceived improvement  (Progressing)       Start:  03/16/25    Expected End:  06/06/25            Patient will demonstrate independence and compliance with final HEP  (Progressing)        Start:  03/16/25    Expected End:  06/06/25              Resolved       STG       Patient will report at least 10% disability reduction from initial NDI score to indicate clinically significant functional improvement   (Met)       Start:  03/16/25    Expected End:  04/25/25    Resolved:  03/31/25         Patient will report at least 10 pt increase on initial FOTO score to indicate clinically significant functional improvement   (Met)       Start:  03/16/25    Expected End:  04/25/25    Resolved:  03/31/25         Patient will report 50% overall perceived improvement  (Met)       Start:  03/16/25    Expected End:  04/25/25    Resolved:  03/31/25         Patient will demonstrate independence and compliance with HEP  (Met)       Start:  03/16/25    Expected End:  04/25/25    Resolved:  03/31/25             Chandler Castellon PT

## 2025-05-05 ENCOUNTER — CLINICAL SUPPORT (OUTPATIENT)
Dept: REHABILITATION | Facility: HOSPITAL | Age: 60
End: 2025-05-05
Payer: COMMERCIAL

## 2025-05-05 DIAGNOSIS — Z98.1 S/P CERVICAL SPINAL FUSION: ICD-10-CM

## 2025-05-05 DIAGNOSIS — M48.02 SPINAL STENOSIS IN CERVICAL REGION: ICD-10-CM

## 2025-05-05 DIAGNOSIS — M54.2 NECK PAIN: Primary | ICD-10-CM

## 2025-05-05 PROCEDURE — 97110 THERAPEUTIC EXERCISES: CPT

## 2025-05-05 PROCEDURE — 97140 MANUAL THERAPY 1/> REGIONS: CPT

## 2025-05-05 PROCEDURE — 97014 ELECTRIC STIMULATION THERAPY: CPT

## 2025-05-05 NOTE — PROGRESS NOTES
Outpatient Rehab    Physical Therapy Visit    Patient Name: Rinku Herzog  MRN: [id number]  YOB: 1965  Encounter Date: 5/5/2025    Therapy Diagnosis:   Encounter Diagnoses   Name Primary?    Neck pain Yes    S/P cervical spinal fusion     Spinal stenosis in cervical region      Physician: Omar Lira, *    Physician Orders: Eval and Treat  Medical Diagnosis: Spinal stenosis in cervical region    Visit # / Visits Authorized:  27 / 50  Insurance Authorization Period: 3/12/2025 to 3/12/2026  Date of Evaluation: 3/13/2025   Plan of Care Certification: 3/13/2025 to 6/6/2025        Time In: 0645   Time Out: 0755  Total Time (in minutes): 70   Total Billable Time (in minutes): 70    FOTO:  Therapist reviewed FOTO scores for Rinku Herzog on 3/13/2025.      Intake: Patient's Physical FS Primary Measure: 22 (goal is 56 @ visit #14)  Intake: Risk Adjusted Statistical FOTO: 51  Intake: Limitation Score: 78%  Intake: Category: Carrying  Intake: NDI:  78.0% (higher score greater disability)      3/28/2025: Patient's Physical FS Primary Measure: 54 (goal is 56 @ visit #14)  Intake: Risk Adjusted Statistical FOTO: 51  3/28/2025: Limitation Score: 46%  3/28/2025: Category: Carrying  3/28/2025: NDI:  30.0% (higher score greater disability)      4/25/2025: Patient's Physical FS Primary Measure: 60 (goal is 56 @ visit #14)  Intake: Risk Adjusted Statistical FOTO: 51  4/25/2025:  Limitation Score: 40%  4/25/2025: Category: Carrying  4/25/2025:   NDI:  22.0% (higher score greater disability)      Intake Score: 22 (predicted 56 by visit #14)%  Survey Score 2: 54 (predicted 56 by visit #14)%  Survey Score 3: 60 (predicted 56 by visit #14)%       Subjective   Rinku reports he had a good weekend. Minimal pain issues but reports that he stayed busy working in his garden..  Pain reported as 0/10.      Objective    Cervical Range of Motion    Active (deg) Passive (deg) Pain   Flexion 40  (avoided overpressure due to  surgical restrictions)     Extension 35       Right Lateral Flexion 30  (avoided overpressure due to surgical restrictions)     Right Rotation 55  (avoided overpressure due to surgical restrictions)     Left Lateral Flexion 25  (avoided overpressure due to surgical restrictions)     Left Rotation 65  (avoided overpressure due to surgical restrictions)                    Treatment:  Therapeutic Exercise  TE 1: 40 mins; Chin tucks, scapula retractions, scap elevations, front headlifts, open book R, R lateral head lifts, open book L, L lateral head lifts, prone head lifts, prone superman, prone W's, cat/camel, banded rows, banded ext, banded ER, banded IR, horizontal abd, bilateral ER, wall clock, serratus wall slides, NuStep  Manual Therapy  MT 1: 15 mins; PA's at thoracic spinous processes, DTM at medial scap and right upper trap, cervical isometrics; gentle passive neck stretch, manual traction/distraction  Modalities  Electrical Stimulation (Parameters): 15 mins; IFC e-stim with moist heat post ex    Time Entry(in minutes): 70  E-Stim (Unattended) Time Entry: 15 (IFC e-stim with moist heat post ex)  Manual Therapy Time Entry: 15  Therapeutic Exercise Time Entry: 40    Assessment & Plan   Assessment: Continued with the plan of care. Rinku is making some progress evident by his score today onthe functional outcome tool. He has gone from 78% limitation to 40 %. He continues to have right cervical and upper shoulder pain but is improving. His pain is no longer constant and he does best when he is active. Prolong sitting makes him hurt. No real trigger points since beginning dry needling. Occasional numbness into his right fingertips but barely noticeable. His cervical ROM is much improved and remains most limited with R rotation. Goes for JENNY on the 13th.  Evaluation/Treatment Tolerance: Patient tolerated treatment well    Patient will continue to benefit from skilled outpatient physical therapy to address the deficits  listed in the problem list box on initial evaluation, provide pt/family education and to maximize pt's level of independence in the home and community environment.     Patient's spiritual, cultural, and educational needs considered and patient agreeable to plan of care and goals.           Plan: Continue POC and progress as indicated toward set goals.    Goals:   Active       LTG       Patient will report at least 20% disability reduction from initial NDI score to indicate clinically significant functional improvement   (Not Progressing)       Start:  03/16/25    Expected End:  06/06/25            Patient will report at least 30 point increase on initial FOTO score to indicate clinically significant functional improvement   (Not Progressing)       Start:  03/16/25    Expected End:  06/06/25            Patient will report 80% overall perceived improvement  (Not Progressing)       Start:  03/16/25    Expected End:  06/06/25            Patient will demonstrate independence and compliance with final HEP  (Not Progressing)       Start:  03/16/25    Expected End:  06/06/25              Resolved       STG       Patient will report at least 10% disability reduction from initial NDI score to indicate clinically significant functional improvement   (Met)       Start:  03/16/25    Expected End:  04/25/25    Resolved:  03/31/25         Patient will report at least 10 pt increase on initial FOTO score to indicate clinically significant functional improvement   (Met)       Start:  03/16/25    Expected End:  04/25/25    Resolved:  03/31/25         Patient will report 50% overall perceived improvement  (Met)       Start:  03/16/25    Expected End:  04/25/25    Resolved:  03/31/25         Patient will demonstrate independence and compliance with HEP  (Met)       Start:  03/16/25    Expected End:  04/25/25    Resolved:  03/31/25             Chandler Castellon PT

## 2025-05-06 ENCOUNTER — LAB VISIT (OUTPATIENT)
Dept: LAB | Facility: HOSPITAL | Age: 60
End: 2025-05-06
Attending: NURSE PRACTITIONER
Payer: COMMERCIAL

## 2025-05-06 DIAGNOSIS — E29.1 3-OXO-5 ALPHA-STEROID DELTA 4-DEHYDROGENASE DEFICIENCY: Primary | ICD-10-CM

## 2025-05-06 LAB
ALBUMIN SERPL-MCNC: 4.1 G/DL (ref 3.5–5)
ALBUMIN/GLOB SERPL: 1.5 RATIO (ref 1.1–2)
ALP SERPL-CCNC: 79 UNIT/L (ref 40–150)
ALT SERPL-CCNC: 32 UNIT/L (ref 0–55)
ANION GAP SERPL CALC-SCNC: 9 MEQ/L
AST SERPL-CCNC: 19 UNIT/L (ref 11–45)
BASOPHILS # BLD AUTO: 0.03 X10(3)/MCL
BASOPHILS NFR BLD AUTO: 0.5 %
BILIRUB SERPL-MCNC: 0.6 MG/DL
BUN SERPL-MCNC: 28 MG/DL (ref 8.4–25.7)
CALCIUM SERPL-MCNC: 9.1 MG/DL (ref 8.4–10.2)
CHLORIDE SERPL-SCNC: 110 MMOL/L (ref 98–107)
CO2 SERPL-SCNC: 24 MMOL/L (ref 22–29)
CREAT SERPL-MCNC: 0.94 MG/DL (ref 0.72–1.25)
CREAT/UREA NIT SERPL: 30
EOSINOPHIL # BLD AUTO: 0.09 X10(3)/MCL (ref 0–0.9)
EOSINOPHIL NFR BLD AUTO: 1.6 %
ERYTHROCYTE [DISTWIDTH] IN BLOOD BY AUTOMATED COUNT: 13.1 % (ref 11.5–17)
ESTRADIOL SERPL HS-MCNC: <24 PG/ML
GFR SERPLBLD CREATININE-BSD FMLA CKD-EPI: >60 ML/MIN/1.73/M2
GLOBULIN SER-MCNC: 2.7 GM/DL (ref 2.4–3.5)
GLUCOSE SERPL-MCNC: 105 MG/DL (ref 74–100)
HCT VFR BLD AUTO: 45.7 % (ref 42–52)
HGB BLD-MCNC: 15.4 G/DL (ref 14–18)
IMM GRANULOCYTES # BLD AUTO: 0.07 X10(3)/MCL (ref 0–0.04)
IMM GRANULOCYTES NFR BLD AUTO: 1.2 %
LYMPHOCYTES # BLD AUTO: 1.77 X10(3)/MCL (ref 0.6–4.6)
LYMPHOCYTES NFR BLD AUTO: 30.5 %
MCH RBC QN AUTO: 30.6 PG (ref 27–31)
MCHC RBC AUTO-ENTMCNC: 33.7 G/DL (ref 33–36)
MCV RBC AUTO: 90.7 FL (ref 80–94)
MONOCYTES # BLD AUTO: 0.47 X10(3)/MCL (ref 0.1–1.3)
MONOCYTES NFR BLD AUTO: 8.1 %
NEUTROPHILS # BLD AUTO: 3.37 X10(3)/MCL (ref 2.1–9.2)
NEUTROPHILS NFR BLD AUTO: 58.1 %
NRBC BLD AUTO-RTO: 0 %
PLATELET # BLD AUTO: 150 X10(3)/MCL (ref 130–400)
PMV BLD AUTO: 9.9 FL (ref 7.4–10.4)
POTASSIUM SERPL-SCNC: 4 MMOL/L (ref 3.5–5.1)
PROT SERPL-MCNC: 6.8 GM/DL (ref 6.4–8.3)
PSA SERPL-MCNC: 3.04 NG/ML
RBC # BLD AUTO: 5.04 X10(6)/MCL (ref 4.7–6.1)
SODIUM SERPL-SCNC: 143 MMOL/L (ref 136–145)
TESTOST SERPL-MCNC: 486.28 NG/DL (ref 220.91–715.81)
WBC # BLD AUTO: 5.8 X10(3)/MCL (ref 4.5–11.5)

## 2025-05-06 PROCEDURE — 85025 COMPLETE CBC W/AUTO DIFF WBC: CPT

## 2025-05-06 PROCEDURE — 84153 ASSAY OF PSA TOTAL: CPT

## 2025-05-06 PROCEDURE — 36415 COLL VENOUS BLD VENIPUNCTURE: CPT

## 2025-05-06 PROCEDURE — 80053 COMPREHEN METABOLIC PANEL: CPT

## 2025-05-06 PROCEDURE — 84403 ASSAY OF TOTAL TESTOSTERONE: CPT

## 2025-05-06 PROCEDURE — 82670 ASSAY OF TOTAL ESTRADIOL: CPT

## 2025-05-07 ENCOUNTER — CLINICAL SUPPORT (OUTPATIENT)
Dept: REHABILITATION | Facility: HOSPITAL | Age: 60
End: 2025-05-07
Payer: COMMERCIAL

## 2025-05-07 DIAGNOSIS — Z98.1 S/P CERVICAL SPINAL FUSION: ICD-10-CM

## 2025-05-07 DIAGNOSIS — M48.02 SPINAL STENOSIS IN CERVICAL REGION: ICD-10-CM

## 2025-05-07 DIAGNOSIS — M54.2 NECK PAIN: Primary | ICD-10-CM

## 2025-05-07 PROCEDURE — 97110 THERAPEUTIC EXERCISES: CPT

## 2025-05-07 PROCEDURE — 97014 ELECTRIC STIMULATION THERAPY: CPT

## 2025-05-07 PROCEDURE — 97140 MANUAL THERAPY 1/> REGIONS: CPT

## 2025-05-07 NOTE — PROGRESS NOTES
Outpatient Rehab    Physical Therapy Visit    Patient Name: Rinku Herzog  MRN: [id number]  YOB: 1965  Encounter Date: 5/7/2025    Therapy Diagnosis:   Encounter Diagnoses   Name Primary?    Neck pain Yes    S/P cervical spinal fusion     Spinal stenosis in cervical region      Physician: Omar Lira, *    Physician Orders: Eval and Treat  Medical Diagnosis: Spinal stenosis in cervical region    Visit # / Visits Authorized:  28 / 50  Insurance Authorization Period: 3/12/2025 to 3/12/2026  Date of Evaluation: 3/13/2025   Plan of Care Certification: 3/13/2025 to 6/6/2025        Time In: 0650   Time Out: 0800  Total Time (in minutes): 70   Total Billable Time (in minutes): 70    FOTO:  Therapist reviewed FOTO scores for Rinku Herzog on 3/13/2025.      Intake: Patient's Physical FS Primary Measure: 22 (goal is 56 @ visit #14)  Intake: Risk Adjusted Statistical FOTO: 51  Intake: Limitation Score: 78%  Intake: Category: Carrying  Intake: NDI:  78.0% (higher score greater disability)      3/28/2025: Patient's Physical FS Primary Measure: 54 (goal is 56 @ visit #14)  Intake: Risk Adjusted Statistical FOTO: 51  3/28/2025: Limitation Score: 46%  3/28/2025: Category: Carrying  3/28/2025: NDI:  30.0% (higher score greater disability)      4/25/2025: Patient's Physical FS Primary Measure: 60 (goal is 56 @ visit #14)  Intake: Risk Adjusted Statistical FOTO: 51  4/25/2025:  Limitation Score: 40%  4/25/2025: Category: Carrying  4/25/2025:   NDI:  22.0% (higher score greater disability)      Intake Score: 22 (predicted 56 by visit #14)%  Survey Score 2: 54 (predicted 56 by visit #14)%  Survey Score 3: 60 (predicted 56 by visit #14)%       Subjective   Rinku reporting that he is having only minimal right neck and shoulder pain this morning. No numbness or tingling. Continues to report that he does best when staying active. Goes for his JENNY on the 13th..  Pain reported as 1/10.      Objective    Cervical  Range of Motion    Active (deg) Passive (deg) Pain   Flexion 40  (avoided overpressure due to surgical restrictions)     Extension 35       Right Lateral Flexion 30  (avoided overpressure due to surgical restrictions)     Right Rotation 55  (avoided overpressure due to surgical restrictions)     Left Lateral Flexion 25  (avoided overpressure due to surgical restrictions)     Left Rotation 65  (avoided overpressure due to surgical restrictions)                    Treatment:  Therapeutic Exercise  TE 1: 40 mins; Chin tucks, scapula retractions, scap elevations, front headlifts, open book R, R lateral head lifts, open book L, L lateral head lifts, prone head lifts, prone superman, prone W's, cat/camel, banded rows, banded ext, banded ER, banded IR, horizontal abd, bilateral ER, wall clock, serratus wall slides, NuStep  Manual Therapy  MT 1: 15 mins; PA's at thoracic spinous processes, DTM at medial scap and right upper trap, cervical isometrics; gentle passive neck stretch, manual traction/distraction  Modalities  Electrical Stimulation (Parameters): 15 mins; IFC e-stim with moist heat post ex    Time Entry(in minutes): 70  E-Stim (Unattended) Time Entry: 15 (IFC e-stim with moist heat post ex)  Manual Therapy Time Entry: 15  Therapeutic Exercise Time Entry: 40    Assessment & Plan   Assessment: Continued with the plan of care. Rinku is making some progress evident by his score today onthe functional outcome tool. He has gone from 78% limitation to 40 %. He continues to have right cervical and upper shoulder pain but is improving. His pain is no longer constant and he does best when he is active. Prolong sitting makes him hurt. No real trigger points since beginning dry needling. Occasional numbness into his right fingertips but barely noticeable. His cervical ROM is much improved and remains most limited with R rotation. Goes for JENNY on the 13th.  Evaluation/Treatment Tolerance: Patient tolerated treatment  well    Patient will continue to benefit from skilled outpatient physical therapy to address the deficits listed in the problem list box on initial evaluation, provide pt/family education and to maximize pt's level of independence in the home and community environment.     Patient's spiritual, cultural, and educational needs considered and patient agreeable to plan of care and goals.           Plan: Continue POC and progress as indicated toward set goals.    Goals:   Active       LTG       Patient will report at least 20% disability reduction from initial NDI score to indicate clinically significant functional improvement   (Progressing)       Start:  03/16/25    Expected End:  06/06/25            Patient will report at least 30 point increase on initial FOTO score to indicate clinically significant functional improvement   (Progressing)       Start:  03/16/25    Expected End:  06/06/25            Patient will report 80% overall perceived improvement  (Progressing)       Start:  03/16/25    Expected End:  06/06/25            Patient will demonstrate independence and compliance with final HEP  (Progressing)       Start:  03/16/25    Expected End:  06/06/25              Resolved       STG       Patient will report at least 10% disability reduction from initial NDI score to indicate clinically significant functional improvement   (Met)       Start:  03/16/25    Expected End:  04/25/25    Resolved:  03/31/25         Patient will report at least 10 pt increase on initial FOTO score to indicate clinically significant functional improvement   (Met)       Start:  03/16/25    Expected End:  04/25/25    Resolved:  03/31/25         Patient will report 50% overall perceived improvement  (Met)       Start:  03/16/25    Expected End:  04/25/25    Resolved:  03/31/25         Patient will demonstrate independence and compliance with HEP  (Met)       Start:  03/16/25    Expected End:  04/25/25    Resolved:  03/31/25              Chandler Castellon, PT

## 2025-05-09 ENCOUNTER — CLINICAL SUPPORT (OUTPATIENT)
Dept: REHABILITATION | Facility: HOSPITAL | Age: 60
End: 2025-05-09
Payer: COMMERCIAL

## 2025-05-09 DIAGNOSIS — M48.02 SPINAL STENOSIS IN CERVICAL REGION: ICD-10-CM

## 2025-05-09 DIAGNOSIS — Z98.1 S/P CERVICAL SPINAL FUSION: ICD-10-CM

## 2025-05-09 DIAGNOSIS — M54.2 NECK PAIN: Primary | ICD-10-CM

## 2025-05-09 PROCEDURE — 97140 MANUAL THERAPY 1/> REGIONS: CPT

## 2025-05-09 PROCEDURE — 97110 THERAPEUTIC EXERCISES: CPT

## 2025-05-09 PROCEDURE — 97014 ELECTRIC STIMULATION THERAPY: CPT

## 2025-05-09 NOTE — PROGRESS NOTES
Outpatient Rehab    Physical Therapy Visit    Patient Name: Rinku Herzog  MRN: [id number]  YOB: 1965  Encounter Date: 5/9/2025    Therapy Diagnosis:   Encounter Diagnoses   Name Primary?    Neck pain Yes    S/P cervical spinal fusion     Spinal stenosis in cervical region      Physician: Omar Lira, *    Physician Orders: Eval and Treat  Medical Diagnosis: Spinal stenosis in cervical region    Visit # / Visits Authorized:  29 / 50  Insurance Authorization Period: 3/12/2025 to 3/12/2026  Date of Evaluation: 3/13/2025   Plan of Care Certification: 3/13/2025 to 6/6/2025        Time In: 0650   Time Out: 0800  Total Time (in minutes): 70   Total Billable Time (in minutes): 70    FOTO:  Therapist reviewed FOTO scores for Rinku Herzog on 3/13/2025.      Intake: Patient's Physical FS Primary Measure: 22 (goal is 56 @ visit #14)  Intake: Risk Adjusted Statistical FOTO: 51  Intake: Limitation Score: 78%  Intake: Category: Carrying  Intake: NDI:  78.0% (higher score greater disability)      3/28/2025: Patient's Physical FS Primary Measure: 54 (goal is 56 @ visit #14)  Intake: Risk Adjusted Statistical FOTO: 51  3/28/2025: Limitation Score: 46%  3/28/2025: Category: Carrying  3/28/2025: NDI:  30.0% (higher score greater disability)      4/25/2025: Patient's Physical FS Primary Measure: 60 (goal is 56 @ visit #14)  Intake: Risk Adjusted Statistical FOTO: 51  4/25/2025:  Limitation Score: 40%  4/25/2025: Category: Carrying  4/25/2025:   NDI:  22.0% (higher score greater disability)      Intake Score: 22 (predicted 56 by visit #14)%  Survey Score 2: 54 (predicted 56 by visit #14)%  Survey Score 3: 60 (predicted 56 by visit #14)%       Subjective   Rinku doing pretty good today having just a little discomfort. Reports that as long as he is active.             Treatment:  Therapeutic Exercise  TE 1: 40 mins; Chin tucks, scapula retractions, scap elevations, front headlifts, open book R, R lateral head  lifts, open book L, L lateral head lifts, prone head lifts, prone superman, prone W's, cat/camel, banded rows, banded ext, banded ER, banded IR, horizontal abd, bilateral ER, wall clock, serratus wall slides, NuStep  Manual Therapy  MT 1: 15 mins; PA's at thoracic spinous processes, DTM at medial scap and right upper trap, cervical isometrics; gentle passive neck stretch, manual traction/distraction  Modalities  Electrical Stimulation (Parameters): 15 mins; IFC e-stim with moist heat post ex    Time Entry(in minutes): 70  E-Stim (Unattended) Time Entry: 15 (IFC e-stim with moist heat post ex)  Manual Therapy Time Entry: 15  Therapeutic Exercise Time Entry: 40    Assessment & Plan   Assessment:         Patient will continue to benefit from skilled outpatient physical therapy to address the deficits listed in the problem list box on initial evaluation, provide pt/family education and to maximize pt's level of independence in the home and community environment.     Patient's spiritual, cultural, and educational needs considered and patient agreeable to plan of care and goals.           Plan:      Goals:   Active       LTG       Patient will report at least 20% disability reduction from initial NDI score to indicate clinically significant functional improvement   (Progressing)       Start:  03/16/25    Expected End:  06/06/25            Patient will report at least 30 point increase on initial FOTO score to indicate clinically significant functional improvement   (Progressing)       Start:  03/16/25    Expected End:  06/06/25            Patient will report 80% overall perceived improvement  (Progressing)       Start:  03/16/25    Expected End:  06/06/25            Patient will demonstrate independence and compliance with final HEP  (Progressing)       Start:  03/16/25    Expected End:  06/06/25              Resolved       STG       Patient will report at least 10% disability reduction from initial NDI score to indicate  clinically significant functional improvement   (Met)       Start:  03/16/25    Expected End:  04/25/25    Resolved:  03/31/25         Patient will report at least 10 pt increase on initial FOTO score to indicate clinically significant functional improvement   (Met)       Start:  03/16/25    Expected End:  04/25/25    Resolved:  03/31/25         Patient will report 50% overall perceived improvement  (Met)       Start:  03/16/25    Expected End:  04/25/25    Resolved:  03/31/25         Patient will demonstrate independence and compliance with HEP  (Met)       Start:  03/16/25    Expected End:  04/25/25    Resolved:  03/31/25             Chandler Castellon PT

## 2025-05-12 ENCOUNTER — CLINICAL SUPPORT (OUTPATIENT)
Dept: REHABILITATION | Facility: HOSPITAL | Age: 60
End: 2025-05-12
Payer: COMMERCIAL

## 2025-05-12 DIAGNOSIS — Z98.1 S/P CERVICAL SPINAL FUSION: ICD-10-CM

## 2025-05-12 DIAGNOSIS — M48.02 SPINAL STENOSIS IN CERVICAL REGION: ICD-10-CM

## 2025-05-12 DIAGNOSIS — M54.2 NECK PAIN: Primary | ICD-10-CM

## 2025-05-12 PROCEDURE — 97014 ELECTRIC STIMULATION THERAPY: CPT

## 2025-05-12 PROCEDURE — 97110 THERAPEUTIC EXERCISES: CPT

## 2025-05-12 PROCEDURE — 97140 MANUAL THERAPY 1/> REGIONS: CPT

## 2025-05-12 NOTE — PROGRESS NOTES
Outpatient Rehab    Physical Therapy Visit    Patient Name: Rinku Herzog  MRN: [id number]  YOB: 1965  Encounter Date: 5/12/2025    Therapy Diagnosis:   Encounter Diagnoses   Name Primary?    Neck pain Yes    S/P cervical spinal fusion     Spinal stenosis in cervical region      Physician: Omar Lira, *    Physician Orders: Eval and Treat  Medical Diagnosis: Spinal stenosis in cervical region    Visit # / Visits Authorized:  30 / 50  Insurance Authorization Period: 3/12/2025 to 3/12/2026  Date of Evaluation: 3/13/2025   Plan of Care Certification: 3/13/2025 to 6/6/2025        Time In: 0648   Time Out: 0758  Total Time (in minutes): 70   Total Billable Time (in minutes): 70    FOTO:  Therapist reviewed FOTO scores for Rinku Herzog on 3/13/2025.      Intake: Patient's Physical FS Primary Measure: 22 (goal is 56 @ visit #14)  Intake: Risk Adjusted Statistical FOTO: 51  Intake: Limitation Score: 78%  Intake: Category: Carrying  Intake: NDI:  78.0% (higher score greater disability)      3/28/2025: Patient's Physical FS Primary Measure: 54 (goal is 56 @ visit #14)  Intake: Risk Adjusted Statistical FOTO: 51  3/28/2025: Limitation Score: 46%  3/28/2025: Category: Carrying  3/28/2025: NDI:  30.0% (higher score greater disability)      4/25/2025: Patient's Physical FS Primary Measure: 60 (goal is 56 @ visit #14)  Intake: Risk Adjusted Statistical FOTO: 51  4/25/2025:  Limitation Score: 40%  4/25/2025: Category: Carrying  4/25/2025:   NDI:  22.0% (higher score greater disability)      Intake Score: 22 (predicted 56 by visit #14)%  Survey Score 2: 54 (predicted 56 by visit #14)%  Survey Score 3: 60 (predicted 56 by visit #14)%       Subjective   Rinku reports that he had some numbness over the weekend and was more pronounced than it had been. Goes for his JENNY tomorrow..  Pain reported as 0/10.          Treatment:  Therapeutic Exercise  TE 1: 40 mins; Chin tucks, scapula retractions, scap elevations,  front headlifts, open book R, R lateral head lifts, open book L, L lateral head lifts, prone head lifts, prone superman, prone W's, cat/camel, banded rows, banded ext, banded ER, banded IR, horizontal abd, bilateral ER, wall clock, serratus wall slides, NuStep  Manual Therapy  MT 1: 15 mins; PA's at thoracic spinous processes, DTM at medial scap and right upper trap, cervical isometrics; gentle passive neck stretch, manual traction/distraction  Modalities  Electrical Stimulation (Parameters): 15 mins; IFC e-stim with moist heat post ex    Time Entry(in minutes): 70  E-Stim (Unattended) Time Entry: 15 (IFC e-stim with moist heat post ex)  Manual Therapy Time Entry: 15  Therapeutic Exercise Time Entry: 40    Assessment & Plan   Assessment: Rinku continues to do well but remains with the nagging pain/soreness at times and there is occasional numbness as well. Anxious for his JENNY tomorrow and hopeful it will be beneficial. Goal is to get back to work. Continued with the plan of care and he did well without any issues. CROM remains pretty good with rigtht rotation his most limited. His restrictions are most likely fixation related.  Evaluation/Treatment Tolerance: Patient tolerated treatment well    Patient will continue to benefit from skilled outpatient physical therapy to address the deficits listed in the problem list box on initial evaluation, provide pt/family education and to maximize pt's level of independence in the home and community environment.     Patient's spiritual, cultural, and educational needs considered and patient agreeable to plan of care and goals.           Plan: Continue POC and progress as indicated toward set goals.    Goals:   Active       LTG       Patient will report at least 20% disability reduction from initial NDI score to indicate clinically significant functional improvement   (Met)       Start:  03/16/25    Expected End:  06/06/25    Resolved:  05/09/25         Patient will report at  least 30 point increase on initial FOTO score to indicate clinically significant functional improvement   (Progressing)       Start:  03/16/25    Expected End:  06/06/25            Patient will report 80% overall perceived improvement  (Progressing)       Start:  03/16/25    Expected End:  06/06/25            Patient will demonstrate independence and compliance with final HEP  (Progressing)       Start:  03/16/25    Expected End:  06/06/25              Resolved       STG       Patient will report at least 10% disability reduction from initial NDI score to indicate clinically significant functional improvement   (Met)       Start:  03/16/25    Expected End:  04/25/25    Resolved:  03/31/25         Patient will report at least 10 pt increase on initial FOTO score to indicate clinically significant functional improvement   (Met)       Start:  03/16/25    Expected End:  04/25/25    Resolved:  03/31/25         Patient will report 50% overall perceived improvement  (Met)       Start:  03/16/25    Expected End:  04/25/25    Resolved:  03/31/25         Patient will demonstrate independence and compliance with HEP  (Met)       Start:  03/16/25    Expected End:  04/25/25    Resolved:  03/31/25             Chandler Castellon PT

## 2025-05-12 NOTE — PROGRESS NOTES
Outpatient Rehab    Physical Therapy Progress Note    Patient Name: Rinku Herzog  MRN: [id number]  YOB: 1965  Encounter Date: 5/12/2025    Therapy Diagnosis:   Encounter Diagnoses   Name Primary?    Neck pain Yes    S/P cervical spinal fusion     Spinal stenosis in cervical region      Physician: Omar Lira, *    Physician Orders: Eval and Treat  Medical Diagnosis: Spinal stenosis in cervical region    Visit # / Visits Authorized:  30 / 50  Insurance Authorization Period: 3/12/2025 to 3/12/2026  Date of Evaluation: 3/13/2025  Plan of Care Certification: 3/17/2025 to 6/6/2025        Time In: 0648   Time Out: 0758  Total Time (in minutes): 70   Total Billable Time (in minutes): 70    FOTO:  Therapist reviewed FOTO scores for Rinku Herzog on 3/13/2025.      Intake: Patient's Physical FS Primary Measure: 22 (goal is 56 @ visit #14)  Intake: Risk Adjusted Statistical FOTO: 51  Intake: Limitation Score: 78%  Intake: Category: Carrying  Intake: NDI:  78.0% (higher score greater disability)      3/28/2025: Patient's Physical FS Primary Measure: 54 (goal is 56 @ visit #14)  Intake: Risk Adjusted Statistical FOTO: 51  3/28/2025: Limitation Score: 46%  3/28/2025: Category: Carrying  3/28/2025: NDI:  30.0% (higher score greater disability)      4/25/2025: Patient's Physical FS Primary Measure: 60 (goal is 56 @ visit #14)  Intake: Risk Adjusted Statistical FOTO: 51  4/25/2025:  Limitation Score: 40%  4/25/2025: Category: Carrying  4/25/2025:   NDI:  22.0% (higher score greater disability)     5/09/2025: Patient's Physical FS Primary Measure: 57 (goal is 55@ visit #14)  Intake: Risk Adjusted Statistical FOTO: 51  5/09/2025:  Limitation Score: 43%  5/09/2025: Category: Carrying  5/09/2025:  NDI:  26.0% (higher score greater disability)      Intake Score: 22 (predicted 56 by visit #14)%  Survey Score 2: 54 (predicted 56 by visit #14)%  Survey Score 3: 60 (predicted 56 by visit #14)%  Survey Score 4: 57  (predicted 55 by visit #14)%     Precautions: post neck fusion restrictions       Subjective   Rinku reports that he had some numbness over the weekend and was more pronounced than it had been. Goes for his JENNY tomorrow..  Pain reported as 0/10.      Objective     Cervical Range of Motion 5/05/2025    Active (deg) Passive (deg) Pain   Flexion 40  (avoided overpressure due to surgical restrictions)     Extension 35       Right Lateral Flexion 30  (avoided overpressure due to surgical restrictions)     Right Rotation 55  (avoided overpressure due to surgical restrictions)     Left Lateral Flexion 25  (avoided overpressure due to surgical restrictions)     Left Rotation 65  (avoided overpressure due to surgical restrictions)             Cervical Range of Motion 5/12/2025    Active (deg) Passive (deg) Pain   Flexion 45  (avoided overpressure due to surgical restrictions)     Extension 38       Right Lateral Flexion 32  (avoided overpressure due to surgical restrictions)     Right Rotation 58  (avoided overpressure due to surgical restrictions)     Left Lateral Flexion 32  (avoided overpressure due to surgical restrictions)     Left Rotation 68  (avoided overpressure due to surgical restrictions)                    Treatment:  Therapeutic Exercise  TE 1: 40 mins; Chin tucks, scapula retractions, scap elevations, front headlifts, open book R, R lateral head lifts, open book L, L lateral head lifts, prone head lifts, prone superman, prone W's, cat/camel, banded rows, banded ext, banded ER, banded IR, horizontal abd, bilateral ER, wall clock, serratus wall slides, NuStep  Manual Therapy  MT 1: 15 mins; PA's at thoracic spinous processes, DTM at medial scap and right upper trap, cervical isometrics; gentle passive neck stretch, manual traction/distraction  Modalities  Electrical Stimulation (Parameters): 15 mins; IFC e-stim with moist heat post ex    Time Entry(in minutes):  E-Stim (Unattended) Time Entry: 15 (IFC e-stim with  moist heat post ex)  Manual Therapy Time Entry: 15  Therapeutic Exercise Time Entry: 40    Assessment & Plan   Assessment: Rinku continues to do well but remains with the nagging pain/soreness at times and there is occasional numbness as well. Anxious for his JENNY tomorrow and hopeful it will be beneficial. Goal is to get back to work. Continued with the plan of care and he did well without any issues. CROM remains pretty good with rigtht rotation his most limited. His restrictions are most likely fixation related.  Evaluation/Treatment Tolerance: Patient tolerated treatment well    Patient will continue to benefit from skilled outpatient physical therapy to address the deficits listed in the problem list box on initial evaluation, provide pt/family education and to maximize pt's level of independence in the home and community environment.     Patient's spiritual, cultural, and educational needs considered and patient agreeable to plan of care and goals.           Plan: Continue POC and progress as indicated toward set goals.    Goals:   Active       LTG       Patient will report at least 20% disability reduction from initial NDI score to indicate clinically significant functional improvement   (Met)       Start:  03/16/25    Expected End:  06/06/25    Resolved:  05/09/25         Patient will report at least 30 point increase on initial FOTO score to indicate clinically significant functional improvement   (Progressing)       Start:  03/16/25    Expected End:  06/06/25            Patient will report 80% overall perceived improvement  (Progressing)       Start:  03/16/25    Expected End:  06/06/25            Patient will demonstrate independence and compliance with final HEP  (Progressing)       Start:  03/16/25    Expected End:  06/06/25              Resolved       STG       Patient will report at least 10% disability reduction from initial NDI score to indicate clinically significant functional improvement   (Met)        Start:  03/16/25    Expected End:  04/25/25    Resolved:  03/31/25         Patient will report at least 10 pt increase on initial FOTO score to indicate clinically significant functional improvement   (Met)       Start:  03/16/25    Expected End:  04/25/25    Resolved:  03/31/25         Patient will report 50% overall perceived improvement  (Met)       Start:  03/16/25    Expected End:  04/25/25    Resolved:  03/31/25         Patient will demonstrate independence and compliance with HEP  (Met)       Start:  03/16/25    Expected End:  04/25/25    Resolved:  03/31/25             Chandler Castellon PT

## 2025-05-16 ENCOUNTER — CLINICAL SUPPORT (OUTPATIENT)
Dept: REHABILITATION | Facility: HOSPITAL | Age: 60
End: 2025-05-16
Payer: COMMERCIAL

## 2025-05-16 DIAGNOSIS — M48.02 SPINAL STENOSIS IN CERVICAL REGION: ICD-10-CM

## 2025-05-16 DIAGNOSIS — Z98.1 S/P CERVICAL SPINAL FUSION: ICD-10-CM

## 2025-05-16 DIAGNOSIS — M54.2 NECK PAIN: Primary | ICD-10-CM

## 2025-05-16 PROCEDURE — 97014 ELECTRIC STIMULATION THERAPY: CPT

## 2025-05-16 PROCEDURE — 97140 MANUAL THERAPY 1/> REGIONS: CPT

## 2025-05-16 PROCEDURE — 97110 THERAPEUTIC EXERCISES: CPT

## 2025-05-16 NOTE — PROGRESS NOTES
Outpatient Rehab    Physical Therapy Visit    Patient Name: Rinku Herzog  MRN: [id number]  YOB: 1965  Encounter Date: 5/16/2025    Therapy Diagnosis:   Encounter Diagnoses   Name Primary?    Neck pain Yes    S/P cervical spinal fusion     Spinal stenosis in cervical region      Physician: Omar Lira, *    Physician Orders: Eval and Treat  Medical Diagnosis: Spinal stenosis in cervical region    Visit # / Visits Authorized:  31 / 50  Insurance Authorization Period: 3/12/2025 to 3/12/2026  Date of Evaluation: 3/13/2025  Plan of Care Certification: 3/17/2025 to 6/6/2025        Time In: 0645   Time Out: 0755  Total Time (in minutes): 70   Total Billable Time (in minutes): 70    FOTO:  Therapist reviewed FOTO scores for Rinku Herzog on 3/13/2025.      Intake: Patient's Physical FS Primary Measure: 22 (goal is 56 @ visit #14)  Intake: Risk Adjusted Statistical FOTO: 51  Intake: Limitation Score: 78%  Intake: Category: Carrying  Intake: NDI:  78.0% (higher score greater disability)      3/28/2025: Patient's Physical FS Primary Measure: 54 (goal is 56 @ visit #14)  Intake: Risk Adjusted Statistical FOTO: 51  3/28/2025: Limitation Score: 46%  3/28/2025: Category: Carrying  3/28/2025: NDI:  30.0% (higher score greater disability)      4/25/2025: Patient's Physical FS Primary Measure: 60 (goal is 56 @ visit #14)  Intake: Risk Adjusted Statistical FOTO: 51  4/25/2025:  Limitation Score: 40%  4/25/2025: Category: Carrying  4/25/2025:   NDI:  22.0% (higher score greater disability)      5/09/2025: Patient's Physical FS Primary Measure: 57 (goal is 55@ visit #14)  Intake: Risk Adjusted Statistical FOTO: 51  5/09/2025:  Limitation Score: 43%  5/09/2025: Category: Carrying  5/09/2025:  NDI:  26.0% (higher score greater disability)      Intake Score: 22 (predicted 56 by visit #14)%  Survey Score 2: 54 (predicted 56 by visit #14)%  Survey Score 3: 60 (predicted 56 by visit #14)%  Survey Score 4: 57  "(predicted 55 by visit #14)%     Precautions: post neck fusion- restrictions       Subjective   Rinku reports that he had some numbness over the weekend and was more pronounced than it had been. Goes for his JENNY tomorrow..  Pain reported as 0/10. No pain but "I feel it"      Treatment:  Therapeutic Exercise  TE 1: 40 mins; Chin tucks, scapula retractions, scap elevations, front headlifts, open book R, R lateral head lifts, open book L, L lateral head lifts, prone head lifts, prone superman, prone W's, cat/camel, banded rows, banded ext, banded ER, banded IR, horizontal abd, bilateral ER, wall clock, serratus wall slides, NuStep  Manual Therapy  MT 1: 15 mins; PA's at thoracic spinous processes, DTM at medial scap and right upper trap, cervical isometrics; gentle passive neck stretch, manual traction/distraction  Modalities  Electrical Stimulation (Parameters): 15 mins; IFC e-stim post ex    Time Entry(in minutes): 70  E-Stim (Unattended) Time Entry: 15 (IFC e-stim)  Manual Therapy Time Entry: 15  Therapeutic Exercise Time Entry: 40    Assessment & Plan   Assessment: Rinku got good relief from his JENNY. Reports that he started to have some pain after sitting at the table for a while talking with family but when he felt it he got up and walked around and had no pain. Goal is to get back to work. Continued with the plan of care and he did well without any issues. CROM remains pretty good with rigtht rotation his most limited but there is some improvement.  Evaluation/Treatment Tolerance: Patient tolerated treatment well    Patient will continue to benefit from skilled outpatient physical therapy to address the deficits listed in the problem list box on initial evaluation, provide pt/family education and to maximize pt's level of independence in the home and community environment.     Patient's spiritual, cultural, and educational needs considered and patient agreeable to plan of care and goals.           Plan: Continue " POC and progress as indicated toward set goals.    Goals:   Active       LTG       Patient will report at least 20% disability reduction from initial NDI score to indicate clinically significant functional improvement   (Met)       Start:  03/16/25    Expected End:  06/06/25    Resolved:  05/09/25         Patient will report at least 30 point increase on initial FOTO score to indicate clinically significant functional improvement   (Progressing)       Start:  03/16/25    Expected End:  06/06/25            Patient will report 80% overall perceived improvement  (Progressing)       Start:  03/16/25    Expected End:  06/06/25            Patient will demonstrate independence and compliance with final HEP  (Progressing)       Start:  03/16/25    Expected End:  06/06/25              Resolved       STG       Patient will report at least 10% disability reduction from initial NDI score to indicate clinically significant functional improvement   (Met)       Start:  03/16/25    Expected End:  04/25/25    Resolved:  03/31/25         Patient will report at least 10 pt increase on initial FOTO score to indicate clinically significant functional improvement   (Met)       Start:  03/16/25    Expected End:  04/25/25    Resolved:  03/31/25         Patient will report 50% overall perceived improvement  (Met)       Start:  03/16/25    Expected End:  04/25/25    Resolved:  03/31/25         Patient will demonstrate independence and compliance with HEP  (Met)       Start:  03/16/25    Expected End:  04/25/25    Resolved:  03/31/25             Chandler Castellon PT

## 2025-05-19 ENCOUNTER — CLINICAL SUPPORT (OUTPATIENT)
Dept: REHABILITATION | Facility: HOSPITAL | Age: 60
End: 2025-05-19
Payer: COMMERCIAL

## 2025-05-19 DIAGNOSIS — M54.2 NECK PAIN: Primary | ICD-10-CM

## 2025-05-19 DIAGNOSIS — M48.02 SPINAL STENOSIS IN CERVICAL REGION: ICD-10-CM

## 2025-05-19 DIAGNOSIS — Z98.1 S/P CERVICAL SPINAL FUSION: ICD-10-CM

## 2025-05-19 PROCEDURE — 97014 ELECTRIC STIMULATION THERAPY: CPT

## 2025-05-19 PROCEDURE — 97140 MANUAL THERAPY 1/> REGIONS: CPT

## 2025-05-19 PROCEDURE — 97110 THERAPEUTIC EXERCISES: CPT

## 2025-05-19 NOTE — PROGRESS NOTES
Outpatient Rehab    Physical Therapy Visit    Patient Name: Rinku Herzog  MRN: [id number]  YOB: 1965  Encounter Date: 5/19/2025    Therapy Diagnosis:   Encounter Diagnoses   Name Primary?    Neck pain Yes    S/P cervical spinal fusion     Spinal stenosis in cervical region      Physician: Omar Lira, *    Physician Orders: Eval and Treat  Medical Diagnosis: Spinal stenosis in cervical region    Visit # / Visits Authorized:  32 / 50  Insurance Authorization Period: 3/12/2025 to 3/12/2026  Date of Evaluation: 3/13/2025  Plan of Care Certification: 3/17/2025 to 6/6/2025        Time In: 0648   Time Out: 0758  Total Time (in minutes): 70   Total Billable Time (in minutes): 70    FOTO:  Therapist reviewed FOTO scores for Rinku Herzog on 3/13/2025.      Intake: Patient's Physical FS Primary Measure: 22 (goal is 56 @ visit #14)  Intake: Risk Adjusted Statistical FOTO: 51  Intake: Limitation Score: 78%  Intake: Category: Carrying  Intake: NDI:  78.0% (higher score greater disability)      3/28/2025: Patient's Physical FS Primary Measure: 54 (goal is 56 @ visit #14)  Intake: Risk Adjusted Statistical FOTO: 51  3/28/2025: Limitation Score: 46%  3/28/2025: Category: Carrying  3/28/2025: NDI:  30.0% (higher score greater disability)      4/25/2025: Patient's Physical FS Primary Measure: 60 (goal is 56 @ visit #14)  Intake: Risk Adjusted Statistical FOTO: 51  4/25/2025:  Limitation Score: 40%  4/25/2025: Category: Carrying  4/25/2025:   NDI:  22.0% (higher score greater disability)      5/09/2025: Patient's Physical FS Primary Measure: 57 (goal is 55@ visit #14)  Intake: Risk Adjusted Statistical FOTO: 51  5/09/2025:  Limitation Score: 43%  5/09/2025: Category: Carrying  5/09/2025:  NDI:  26.0% (higher score greater disability)      Intake Score: 22 (predicted 56 by visit #14)%  Survey Score 2: 54 (predicted 56 by visit #14)%  Survey Score 3: 60 (predicted 56 by visit #14)%  Survey Score 4: 57  "(predicted 55 by visit #14)%     Precautions: post neck fusion- restrictions       Subjective   Rinku  reports that he had a pretty good weekend. Did some swimming in his pool and feels it was beneficial. Also feels the JENNY was beneficial..    No pain but "I feel it"      Treatment:  Therapeutic Exercise  TE 1: 40 mins; Chin tucks, scapula retractions, scap elevations, front headlifts, open book R, R lateral head lifts, open book L, L lateral head lifts, prone head lifts, prone superman, prone W's, cat/camel, banded rows, banded ext, banded ER, banded IR, horizontal abd, bilateral ER, wall clock, serratus wall slides, NuStep  Manual Therapy  MT 1: 15 mins; PA's at thoracic spinous processes, DTM at medial scap and right upper trap, cervical isometrics; gentle passive neck stretch, manual traction/distraction  Modalities  Electrical Stimulation (Parameters): 15 mins; IFC e-stim post ex    Time Entry(in minutes): 70  E-Stim (Unattended) Time Entry: 15 (IFC e-stim with moist heat post ex)  Manual Therapy Time Entry: 15  Therapeutic Exercise Time Entry: 40    Assessment & Plan   Assessment: Rinku is responding to therapy, His JENNY injection has proven to be benefical as well. Still with some buring pain after sitting for prolong periods but is much less. Continues to do best when staying active. Goal is to get back to work. Continued with the plan of care and there were no issues. CROM remains pretty good with rigtht rotation his most limited but there is some improvement.  Evaluation/Treatment Tolerance: Patient tolerated treatment well    Patient will continue to benefit from skilled outpatient physical therapy to address the deficits listed in the problem list box on initial evaluation, provide pt/family education and to maximize pt's level of independence in the home and community environment.     Patient's spiritual, cultural, and educational needs considered and patient agreeable to plan of care and goals.       "     Plan: Continue POC and progress as indicated toward set goals.    Goals:   Active       LTG       Patient will report at least 20% disability reduction from initial NDI score to indicate clinically significant functional improvement   (Met)       Start:  03/16/25    Expected End:  06/06/25    Resolved:  05/09/25         Patient will report at least 30 point increase on initial FOTO score to indicate clinically significant functional improvement   (Progressing)       Start:  03/16/25    Expected End:  06/06/25            Patient will report 80% overall perceived improvement  (Progressing)       Start:  03/16/25    Expected End:  06/06/25            Patient will demonstrate independence and compliance with final HEP  (Progressing)       Start:  03/16/25    Expected End:  06/06/25              Resolved       STG       Patient will report at least 10% disability reduction from initial NDI score to indicate clinically significant functional improvement   (Met)       Start:  03/16/25    Expected End:  04/25/25    Resolved:  03/31/25         Patient will report at least 10 pt increase on initial FOTO score to indicate clinically significant functional improvement   (Met)       Start:  03/16/25    Expected End:  04/25/25    Resolved:  03/31/25         Patient will report 50% overall perceived improvement  (Met)       Start:  03/16/25    Expected End:  04/25/25    Resolved:  03/31/25         Patient will demonstrate independence and compliance with HEP  (Met)       Start:  03/16/25    Expected End:  04/25/25    Resolved:  03/31/25             Chandler Castellon PT

## 2025-05-23 ENCOUNTER — CLINICAL SUPPORT (OUTPATIENT)
Dept: REHABILITATION | Facility: HOSPITAL | Age: 60
End: 2025-05-23
Payer: COMMERCIAL

## 2025-05-23 DIAGNOSIS — Z98.1 S/P CERVICAL SPINAL FUSION: ICD-10-CM

## 2025-05-23 DIAGNOSIS — M48.02 SPINAL STENOSIS IN CERVICAL REGION: ICD-10-CM

## 2025-05-23 DIAGNOSIS — M54.2 NECK PAIN: Primary | ICD-10-CM

## 2025-05-23 PROCEDURE — 97530 THERAPEUTIC ACTIVITIES: CPT

## 2025-05-23 PROCEDURE — 97110 THERAPEUTIC EXERCISES: CPT

## 2025-05-23 PROCEDURE — 97014 ELECTRIC STIMULATION THERAPY: CPT

## 2025-05-23 PROCEDURE — 97140 MANUAL THERAPY 1/> REGIONS: CPT

## 2025-05-23 NOTE — PROGRESS NOTES
Outpatient Rehab    Physical Therapy Visit    Patient Name: Rinku Herzog  MRN: [id number]  YOB: 1965  Encounter Date: 5/23/2025    Therapy Diagnosis:   Encounter Diagnoses   Name Primary?    Neck pain Yes    S/P cervical spinal fusion     Spinal stenosis in cervical region      Physician: Omar Lira, *    Physician Orders: Eval and Treat  Medical Diagnosis: Spinal stenosis in cervical region    Visit # / Visits Authorized:  33 / 50  Insurance Authorization Period: 3/12/2025 to 3/12/2026  Date of Evaluation: 3/13/2025  Plan of Care Certification: 3/17/2025 to 6/6/2025        Time In: 0650   Time Out: 0810  Total Time (in minutes): 80   Total Billable Time (in minutes): 80    FOTO:  Therapist reviewed FOTO scores for Rinku Herzog on 3/13/2025.      Intake: Patient's Physical FS Primary Measure: 22 (goal is 56 @ visit #14)  Intake: Risk Adjusted Statistical FOTO: 51  Intake: Limitation Score: 78%  Intake: Category: Carrying  Intake: NDI:  78.0% (higher score greater disability)      3/28/2025: Patient's Physical FS Primary Measure: 54 (goal is 56 @ visit #14)  Intake: Risk Adjusted Statistical FOTO: 51  3/28/2025: Limitation Score: 46%  3/28/2025: Category: Carrying  3/28/2025: NDI:  30.0% (higher score greater disability)      4/25/2025: Patient's Physical FS Primary Measure: 60 (goal is 56 @ visit #14)  Intake: Risk Adjusted Statistical FOTO: 51  4/25/2025:  Limitation Score: 40%  4/25/2025: Category: Carrying  4/25/2025:   NDI:  22.0% (higher score greater disability)      5/09/2025: Patient's Physical FS Primary Measure: 57 (goal is 55@ visit #14)  Intake: Risk Adjusted Statistical FOTO: 51  5/09/2025:  Limitation Score: 43%  5/09/2025: Category: Carrying  5/09/2025:  NDI:  26.0% (higher score greater disability)      Intake Score: 22 (predicted 56 by visit #14)%  Survey Score 2: 54 (predicted 56 by visit #14)%  Survey Score 3: 60 (predicted 56 by visit #14)%  Survey Score 4: 57  "(predicted 55 by visit #14)%     Precautions: post neck fusion- restrictions       Subjective   Rinku reports that he is doing really well. Boilded 6 dozen crabs yesterday and sat there peeling and eating. Had a little "twinge" in the right neck and shoulder but nothing more..  Pain reported as 0/10.        Treatment:  Therapeutic Exercise  TE 1: 38 mins; Chin tucks, scapula retractions, scap elevations, front headlifts, open book R, R lateral head lifts, open book L, L lateral head lifts, prone head lifts, prone superman, prone W's, cat/camel, banded rows, banded ext, banded ER, banded IR, horizontal abd, bilateral ER, wall clock, serratus wall slides  Manual Therapy  MT 1: 15 mins; PA's at thoracic spinous processes, DTM at medial scap and right upper trap, cervical isometrics; gentle passive neck stretch, manual traction/distraction  Therapeutic Activity  TA 1: 12 mins; NuStep, serratus wall slides  Modalities  Electrical Stimulation (Parameters): 15 mins; IFC e-stim post ex    Time Entry(in minutes): 80  E-Stim (Unattended) Time Entry: 15 (IFC e-stim with moist heat post ex)  Manual Therapy Time Entry: 15  Therapeutic Activity Time Entry: 12  Therapeutic Exercise Time Entry: 38    Assessment & Plan   Assessment: Rinku is responding to therapy, His JENNY injection has proven to be benefical as well. Able to sit for prolong periods without any real symptoms now. Continues to do best when staying active. Goal is to get back to work. Continued with the plan of care and there were no issues. CROM remains pretty good with rigtht rotation his most limited but there is some improvement. No upper back tightness or trigger points as before.  Evaluation/Treatment Tolerance: Patient tolerated treatment well    Patient will continue to benefit from skilled outpatient physical therapy to address the deficits listed in the problem list box on initial evaluation, provide pt/family education and to maximize pt's level of " independence in the home and community environment.     Patient's spiritual, cultural, and educational needs considered and patient agreeable to plan of care and goals.           Plan: Continue POC and progress as indicated toward set goals.    Goals:   Active       LTG       Patient will report at least 20% disability reduction from initial NDI score to indicate clinically significant functional improvement   (Met)       Start:  03/16/25    Expected End:  06/06/25    Resolved:  05/09/25         Patient will report at least 30 point increase on initial FOTO score to indicate clinically significant functional improvement   (Progressing)       Start:  03/16/25    Expected End:  06/06/25            Patient will report 80% overall perceived improvement  (Progressing)       Start:  03/16/25    Expected End:  06/06/25            Patient will demonstrate independence and compliance with final HEP  (Progressing)       Start:  03/16/25    Expected End:  06/06/25              Resolved       STG       Patient will report at least 10% disability reduction from initial NDI score to indicate clinically significant functional improvement   (Met)       Start:  03/16/25    Expected End:  04/25/25    Resolved:  03/31/25         Patient will report at least 10 pt increase on initial FOTO score to indicate clinically significant functional improvement   (Met)       Start:  03/16/25    Expected End:  04/25/25    Resolved:  03/31/25         Patient will report 50% overall perceived improvement  (Met)       Start:  03/16/25    Expected End:  04/25/25    Resolved:  03/31/25         Patient will demonstrate independence and compliance with HEP  (Met)       Start:  03/16/25    Expected End:  04/25/25    Resolved:  03/31/25             Chandler Castellon PT

## 2025-05-26 ENCOUNTER — CLINICAL SUPPORT (OUTPATIENT)
Dept: REHABILITATION | Facility: HOSPITAL | Age: 60
End: 2025-05-26
Payer: COMMERCIAL

## 2025-05-26 DIAGNOSIS — M48.02 SPINAL STENOSIS IN CERVICAL REGION: ICD-10-CM

## 2025-05-26 DIAGNOSIS — M54.2 NECK PAIN: Primary | ICD-10-CM

## 2025-05-26 DIAGNOSIS — Z98.1 S/P CERVICAL SPINAL FUSION: ICD-10-CM

## 2025-05-26 PROCEDURE — 97014 ELECTRIC STIMULATION THERAPY: CPT

## 2025-05-26 PROCEDURE — 97140 MANUAL THERAPY 1/> REGIONS: CPT

## 2025-05-26 PROCEDURE — 97530 THERAPEUTIC ACTIVITIES: CPT

## 2025-05-26 NOTE — PROGRESS NOTES
Outpatient Rehab    Physical Therapy Visit    Patient Name: Rinku Herzog  MRN: [id number]  YOB: 1965  Encounter Date: 5/26/2025    Therapy Diagnosis:   Encounter Diagnoses   Name Primary?    Neck pain Yes    S/P cervical spinal fusion     Spinal stenosis in cervical region      Physician: Omar Lira, *    Physician Orders: Eval and Treat  Medical Diagnosis: Spinal stenosis in cervical region    Visit # / Visits Authorized:  34 / 50  Insurance Authorization Period: 3/12/2025 to 3/12/2026  Date of Evaluation: 3/13/2025  Plan of Care Certification: 3/17/2025 to 6/6/2025        Time In: 0645   Time Out: 0805  Total Time (in minutes): 80   Total Billable Time (in minutes): 80    FOTO:  Therapist reviewed FOTO scores for Rinku Herzog on 3/13/2025.      Intake: Patient's Physical FS Primary Measure: 22 (goal is 56 @ visit #14)  Intake: Risk Adjusted Statistical FOTO: 51  Intake: Limitation Score: 78%  Intake: Category: Carrying  Intake: NDI:  78.0% (higher score greater disability)      3/28/2025: Patient's Physical FS Primary Measure: 54 (goal is 56 @ visit #14)  Intake: Risk Adjusted Statistical FOTO: 51  3/28/2025: Limitation Score: 46%  3/28/2025: Category: Carrying  3/28/2025: NDI:  30.0% (higher score greater disability)      4/25/2025: Patient's Physical FS Primary Measure: 60 (goal is 56 @ visit #14)  Intake: Risk Adjusted Statistical FOTO: 51  4/25/2025:  Limitation Score: 40%  4/25/2025: Category: Carrying  4/25/2025:   NDI:  22.0% (higher score greater disability)      5/09/2025: Patient's Physical FS Primary Measure: 57 (goal is 55@ visit #14)  Intake: Risk Adjusted Statistical FOTO: 51  5/09/2025:  Limitation Score: 43%  5/09/2025: Category: Carrying  5/09/2025:  NDI:  26.0% (higher score greater disability)      Intake Score: 22 (predicted 56 by visit #14)%  Survey Score 2: 54 (predicted 56 by visit #14)%  Survey Score 3: 60 (predicted 56 by visit #14)%  Survey Score 4: 57  "(predicted 55 by visit #14)%     Precautions: post neck fusion- restrictions       Subjective   Rinku comes to therapy today reporting that he has not had any pain for about a week now. Doing more around the house and states that as long as he stays active the better he feels. Still will feel a "twinge" if he tries to lift something or sits at the table for extended periods using his arms. If is stays good like this will cancel the nerve conduction that is scheduled for the 4th. May or may not take another JENNY..  Pain reported as 0/10.        Treatment:  Therapeutic Exercise  TE 1: 38 mins; Chin tucks, scapula retractions, scap elevations, front headlifts, open book R, R lateral head lifts, open book L, L lateral head lifts, prone head lifts, prone superman, prone W's, cat/camel, banded rows, banded ext, banded ER, banded IR, horizontal abd, bilateral ER, wall clock, serratus wall slides  Manual Therapy  MT 1: 15 mins; PA's at thoracic spinous processes, DTM at medial scap and right upper trap, cervical isometrics; gentle passive neck stretch, manual traction/distraction  Therapeutic Activity  TA 1: 12 mins; NuStep, serratus wall slides  Modalities  Electrical Stimulation (Parameters): 15 mins; IFC e-stim post ex    Time Entry(in minutes): 80  E-Stim (Unattended) Time Entry: 15 (IFC e-stim with moist heat post ex)  Manual Therapy Time Entry: 15  Therapeutic Activity Time Entry: 12    Assessment & Plan   Assessment: Rinku is responding to therapy as well as his JENNY injection as he is really responding for about the last week has not had any pain. Able to sit for prolong periods without any real symptoms now but will feel a "twinge" if he is using his arms during the prolong sitting. Continues to do best when staying active. Goal is to get back to work. Continued with the plan of care and there were no issues. CROM remains pretty good with rigtht rotation his most limited but there is some improvement. No upper back " tightness or trigger points as before.  Evaluation/Treatment Tolerance: Patient tolerated treatment well    Patient will continue to benefit from skilled outpatient physical therapy to address the deficits listed in the problem list box on initial evaluation, provide pt/family education and to maximize pt's level of independence in the home and community environment.     Patient's spiritual, cultural, and educational needs considered and patient agreeable to plan of care and goals.           Plan: Continue POC and progress as indicated toward set goals.    Goals:   Active       LTG       Patient will report at least 20% disability reduction from initial NDI score to indicate clinically significant functional improvement   (Met)       Start:  03/16/25    Expected End:  06/06/25    Resolved:  05/09/25         Patient will report at least 30 point increase on initial FOTO score to indicate clinically significant functional improvement   (Progressing)       Start:  03/16/25    Expected End:  06/06/25            Patient will report 80% overall perceived improvement  (Progressing)       Start:  03/16/25    Expected End:  06/06/25            Patient will demonstrate independence and compliance with final HEP  (Progressing)       Start:  03/16/25    Expected End:  06/06/25              Resolved       STG       Patient will report at least 10% disability reduction from initial NDI score to indicate clinically significant functional improvement   (Met)       Start:  03/16/25    Expected End:  04/25/25    Resolved:  03/31/25         Patient will report at least 10 pt increase on initial FOTO score to indicate clinically significant functional improvement   (Met)       Start:  03/16/25    Expected End:  04/25/25    Resolved:  03/31/25         Patient will report 50% overall perceived improvement  (Met)       Start:  03/16/25    Expected End:  04/25/25    Resolved:  03/31/25         Patient will demonstrate independence and  compliance with HEP  (Met)       Start:  03/16/25    Expected End:  04/25/25    Resolved:  03/31/25             Chandler Castellon PT

## 2025-05-30 ENCOUNTER — CLINICAL SUPPORT (OUTPATIENT)
Dept: REHABILITATION | Facility: HOSPITAL | Age: 60
End: 2025-05-30
Payer: COMMERCIAL

## 2025-05-30 DIAGNOSIS — Z98.1 S/P CERVICAL SPINAL FUSION: ICD-10-CM

## 2025-05-30 DIAGNOSIS — M54.2 NECK PAIN: Primary | ICD-10-CM

## 2025-05-30 DIAGNOSIS — M48.02 SPINAL STENOSIS IN CERVICAL REGION: ICD-10-CM

## 2025-05-30 PROCEDURE — 97530 THERAPEUTIC ACTIVITIES: CPT

## 2025-05-30 PROCEDURE — 97110 THERAPEUTIC EXERCISES: CPT

## 2025-05-30 PROCEDURE — 97140 MANUAL THERAPY 1/> REGIONS: CPT

## 2025-05-30 PROCEDURE — 97014 ELECTRIC STIMULATION THERAPY: CPT

## 2025-05-30 NOTE — PROGRESS NOTES
Outpatient Rehab    Physical Therapy Visit    Patient Name: Rinku Herzog  MRN: [id number]  YOB: 1965  Encounter Date: 5/30/2025    Therapy Diagnosis:   Encounter Diagnoses   Name Primary?    Neck pain Yes    S/P cervical spinal fusion     Spinal stenosis in cervical region      Physician: Omar Lira, *    Physician Orders: Eval and Treat  Medical Diagnosis: Spinal stenosis in cervical region    Visit # / Visits Authorized:  35 / 50  Insurance Authorization Period: 3/12/2025 to 3/12/2026  Date of Evaluation: 3/13/2025  Plan of Care Certification: 3/17/2025 to 6/6/2025        Time In: 0646   Time Out: 0758  Total Time (in minutes): 72   Total Billable Time (in minutes): 72    FOTO:  Therapist reviewed FOTO scores for Rinku Herzog on 3/13/2025.      Intake: Patient's Physical FS Primary Measure: 22 (goal is 56 @ visit #14)  Intake: Risk Adjusted Statistical FOTO: 51  Intake: Limitation Score: 78%  Intake: Category: Carrying  Intake: NDI:  78.0% (higher score greater disability)      3/28/2025: Patient's Physical FS Primary Measure: 54 (goal is 56 @ visit #14)  Intake: Risk Adjusted Statistical FOTO: 51  3/28/2025: Limitation Score: 46%  3/28/2025: Category: Carrying  3/28/2025: NDI:  30.0% (higher score greater disability)      4/25/2025: Patient's Physical FS Primary Measure: 60 (goal is 56 @ visit #14)  Intake: Risk Adjusted Statistical FOTO: 51  4/25/2025:  Limitation Score: 40%  4/25/2025: Category: Carrying  4/25/2025:   NDI:  22.0% (higher score greater disability)      5/09/2025: Patient's Physical FS Primary Measure: 57 (goal is 55@ visit #14)  Intake: Risk Adjusted Statistical FOTO: 51  5/09/2025:  Limitation Score: 43%  5/09/2025: Category: Carrying  5/09/2025:  NDI:  26.0% (higher score greater disability)      Intake Score: 22 (predicted 56 by visit #14)%  Survey Score 2: 54 (predicted 56 by visit #14)%  Survey Score 3: 60 (predicted 56 by visit #14)%  Survey Score 4: 57  "(predicted 55 by visit #14)%     Precautions: post neck fusion- restrictions       Subjective   Rinku reports that he saw his doctor and is scheduled for another JENNY on June 3rd. Has had hardly any pain at all of late. Has been staying very active doing work around the house and has had no issues. If sits for extended periods and uses his arms for repetitive movements thn he will notice it..  Pain reported as 0/10.        Treatment:  Therapeutic Exercise  TE 1: 30 mins; Chin tucks, scapula retractions, scap elevations, front headlifts, open book R, R lateral head lifts, open book L, L lateral head lifts, prone head lifts, prone superman, prone W's, cat/camel, banded rows, banded ext, banded ER, banded IR, horizontal abd, bilateral ER, wall clock, serratus wall slides  Manual Therapy  MT 1: 15 mins; PA's at thoracic spinous processes, DTM at medial scap and right upper trap, cervical isometrics; gentle passive neck stretch, manual traction/distraction  Therapeutic Activity  TA 1: 12 mins; NuStep, serratus wall slides  Modalities  Electrical Stimulation (Parameters): 15 mins; IFC e-stim post ex    Time Entry(in minutes): 72  E-Stim (Unattended) Time Entry: 15 (IFC e-stim with moist heat post ex)  Manual Therapy Time Entry: 15  Therapeutic Activity Time Entry: 12  Therapeutic Exercise Time Entry: 30    Assessment & Plan   Assessment: Rinku is doing well. Getting good relief from responding to therapy and got good relief from his injection. Able to sit for prolong periods without any real symptoms now but will feel a "twinge" with repetitive movements. Continues to do best when staying active. Goal is to get back to work. Continued with the plan of care and there were no issues. CROM remains pretty good with rigtht rotation his most limited but there is some improvement. No upper back tightness or trigger points as before.  Evaluation/Treatment Tolerance: Patient tolerated treatment well    Patient will continue to " benefit from skilled outpatient physical therapy to address the deficits listed in the problem list box on initial evaluation, provide pt/family education and to maximize pt's level of independence in the home and community environment.     Patient's spiritual, cultural, and educational needs considered and patient agreeable to plan of care and goals.           Plan: Continue POC and progress as indicated toward set goals.    Goals:   Active       LTG       Patient will report at least 20% disability reduction from initial NDI score to indicate clinically significant functional improvement   (Met)       Start:  03/16/25    Expected End:  06/06/25    Resolved:  05/09/25         Patient will report at least 30 point increase on initial FOTO score to indicate clinically significant functional improvement   (Progressing)       Start:  03/16/25    Expected End:  06/06/25            Patient will report 80% overall perceived improvement  (Progressing)       Start:  03/16/25    Expected End:  06/06/25            Patient will demonstrate independence and compliance with final HEP  (Progressing)       Start:  03/16/25    Expected End:  06/06/25              Resolved       STG       Patient will report at least 10% disability reduction from initial NDI score to indicate clinically significant functional improvement   (Met)       Start:  03/16/25    Expected End:  04/25/25    Resolved:  03/31/25         Patient will report at least 10 pt increase on initial FOTO score to indicate clinically significant functional improvement   (Met)       Start:  03/16/25    Expected End:  04/25/25    Resolved:  03/31/25         Patient will report 50% overall perceived improvement  (Met)       Start:  03/16/25    Expected End:  04/25/25    Resolved:  03/31/25         Patient will demonstrate independence and compliance with HEP  (Met)       Start:  03/16/25    Expected End:  04/25/25    Resolved:  03/31/25             Chandler Castellon  PT

## 2025-06-02 ENCOUNTER — CLINICAL SUPPORT (OUTPATIENT)
Dept: REHABILITATION | Facility: HOSPITAL | Age: 60
End: 2025-06-02
Payer: COMMERCIAL

## 2025-06-02 DIAGNOSIS — M54.2 NECK PAIN: Primary | ICD-10-CM

## 2025-06-02 DIAGNOSIS — M48.02 SPINAL STENOSIS IN CERVICAL REGION: ICD-10-CM

## 2025-06-02 DIAGNOSIS — Z98.1 S/P CERVICAL SPINAL FUSION: ICD-10-CM

## 2025-06-02 PROCEDURE — 97530 THERAPEUTIC ACTIVITIES: CPT

## 2025-06-02 PROCEDURE — 97140 MANUAL THERAPY 1/> REGIONS: CPT

## 2025-06-02 PROCEDURE — 97110 THERAPEUTIC EXERCISES: CPT

## 2025-06-02 PROCEDURE — 97014 ELECTRIC STIMULATION THERAPY: CPT

## 2025-06-05 DIAGNOSIS — M50.90 CERVICAL DISC DISEASE: Primary | ICD-10-CM

## 2025-06-05 RX ORDER — GABAPENTIN 300 MG/1
300 CAPSULE ORAL 3 TIMES DAILY
Qty: 90 CAPSULE | Refills: 3 | Status: SHIPPED | OUTPATIENT
Start: 2025-06-05

## 2025-06-11 ENCOUNTER — CLINICAL SUPPORT (OUTPATIENT)
Dept: REHABILITATION | Facility: HOSPITAL | Age: 60
End: 2025-06-11
Payer: COMMERCIAL

## 2025-06-11 DIAGNOSIS — Z98.1 S/P CERVICAL SPINAL FUSION: ICD-10-CM

## 2025-06-11 DIAGNOSIS — M54.2 NECK PAIN: Primary | ICD-10-CM

## 2025-06-11 DIAGNOSIS — M48.02 SPINAL STENOSIS IN CERVICAL REGION: ICD-10-CM

## 2025-06-11 PROCEDURE — 97140 MANUAL THERAPY 1/> REGIONS: CPT

## 2025-06-11 PROCEDURE — 97110 THERAPEUTIC EXERCISES: CPT

## 2025-06-11 PROCEDURE — 97014 ELECTRIC STIMULATION THERAPY: CPT

## 2025-06-11 PROCEDURE — 97530 THERAPEUTIC ACTIVITIES: CPT

## 2025-06-11 NOTE — PROGRESS NOTES
Outpatient Rehab    Physical Therapy Visit    Patient Name: Rinku Herzog  MRN: [id number]  YOB: 1965  Encounter Date: 6/11/2025    Therapy Diagnosis:   Encounter Diagnoses   Name Primary?    Neck pain Yes    S/P cervical spinal fusion     Spinal stenosis in cervical region      Physician: Omar Lira, *    Physician Orders: Eval and Treat  Medical Diagnosis: Spinal stenosis in cervical region    Visit # / Visits Authorized:  37 / 50  Insurance Authorization Period: 3/12/2025 to 3/12/2026  Date of Evaluation: 3/13/2025  Plan of Care Certification: 3/17/2025 to 6/6/2025        Time In: 0650   Time Out: 0802  Total Time (in minutes): 72   Total Billable Time (in minutes): 72    FOTO:  Therapist reviewed FOTO scores for Rinku Herzog on 3/13/2025.      Intake: Patient's Physical FS Primary Measure: 22 (goal is 56 @ visit #14)  Intake: Risk Adjusted Statistical FOTO: 51  Intake: Limitation Score: 78%  Intake: Category: Carrying  Intake: NDI:  78.0% (higher score greater disability)      3/28/2025: Patient's Physical FS Primary Measure: 54 (goal is 56 @ visit #14)  Intake: Risk Adjusted Statistical FOTO: 51  3/28/2025: Limitation Score: 46%  3/28/2025: Category: Carrying  3/28/2025: NDI:  30.0% (higher score greater disability)      4/25/2025: Patient's Physical FS Primary Measure: 60 (goal is 56 @ visit #14)  Intake: Risk Adjusted Statistical FOTO: 51  4/25/2025:  Limitation Score: 40%  4/25/2025: Category: Carrying  4/25/2025:   NDI:  22.0% (higher score greater disability)      5/09/2025: Patient's Physical FS Primary Measure: 57 (goal is 55@ visit #14)  Intake: Risk Adjusted Statistical FOTO: 51  5/09/2025:  Limitation Score: 43%  5/09/2025: Category: Carrying  5/09/2025:  NDI:  26.0% (higher score greater disability)      Intake Score: 22 (predicted 56 by visit #14)%  Survey Score 2: 54 (predicted 56 by visit #14)%  Survey Score 3: 60 (predicted 56 by visit #14)%  Survey Score 4: 57  (predicted 55 by visit #14)%     Precautions: post neck fusion- restrictions       Subjective   Rinku reports that he is doing well but did have another episode after some prolong sitting where he began to have some aching in the right neck and shoulder area. It got better after moving arojnd and doing things..  Pain reported as 0/10. soreness only.      Treatment:  Therapeutic Exercise  TE 1: 30 mins; Chin tucks, scapula retractions, scap elevations, front headlifts, open book R, R lateral head lifts, open book L, L lateral head lifts, prone head lifts, prone superman, prone W's, cat/camel, banded rows, banded ext, banded ER, banded IR, horizontal abd, bilateral ER, wall clock, serratus wall slides  Manual Therapy  MT 1: 15 mins; PA's at thoracic spinous processes, DTM at medial scap and right upper trap, cervical isometrics; gentle passive neck stretch, manual traction/distraction  Therapeutic Activity  TA 1: 12 mins; NuStep, serratus wall slides  Modalities  Electrical Stimulation (Parameters): 15 mins; IFC e-stim post ex    Time Entry(in minutes): 72  E-Stim (Unattended) Time Entry: 15  Manual Therapy Time Entry: 15  Therapeutic Activity Time Entry: 12  Therapeutic Exercise Time Entry: 30    Assessment & Plan   Assessment: Rinku is doing well. Getting good relief and responding to therapy and he got good relief from his 2nd injection. Able to perform tasks and jobs around his house without adverse reactions. Continued with the plan of care and there were no issues. CROM remains pretty good with rigtht rotation his most limited but there is good  improvement. No upper trap tightness or trigger points as before. There continues to be imprroved thoracic spine mobility with thoracic PA's.  Evaluation/Treatment Tolerance: Patient tolerated treatment well    Patient will continue to benefit from skilled outpatient physical therapy to address the deficits listed in the problem list box on initial evaluation, provide  pt/family education and to maximize pt's level of independence in the home and community environment.     Patient's spiritual, cultural, and educational needs considered and patient agreeable to plan of care and goals.           Plan: Continue POC and progress as indicated toward set goals.    Goals:   Active       LTG       Patient will report at least 20% disability reduction from initial NDI score to indicate clinically significant functional improvement   (Met)       Start:  03/16/25    Expected End:  06/06/25    Resolved:  05/09/25         Patient will report at least 30 point increase on initial FOTO score to indicate clinically significant functional improvement   (Progressing)       Start:  03/16/25    Expected End:  06/06/25            Patient will report 80% overall perceived improvement  (Progressing)       Start:  03/16/25    Expected End:  06/06/25            Patient will demonstrate independence and compliance with final HEP  (Progressing)       Start:  03/16/25    Expected End:  06/06/25              Resolved       STG       Patient will report at least 10% disability reduction from initial NDI score to indicate clinically significant functional improvement   (Met)       Start:  03/16/25    Expected End:  04/25/25    Resolved:  03/31/25         Patient will report at least 10 pt increase on initial FOTO score to indicate clinically significant functional improvement   (Met)       Start:  03/16/25    Expected End:  04/25/25    Resolved:  03/31/25         Patient will report 50% overall perceived improvement  (Met)       Start:  03/16/25    Expected End:  04/25/25    Resolved:  03/31/25         Patient will demonstrate independence and compliance with HEP  (Met)       Start:  03/16/25    Expected End:  04/25/25    Resolved:  03/31/25             Chandler Castellon PT

## 2025-06-13 ENCOUNTER — CLINICAL SUPPORT (OUTPATIENT)
Dept: REHABILITATION | Facility: HOSPITAL | Age: 60
End: 2025-06-13
Payer: COMMERCIAL

## 2025-06-13 DIAGNOSIS — M54.2 NECK PAIN: Primary | ICD-10-CM

## 2025-06-13 DIAGNOSIS — M48.02 SPINAL STENOSIS IN CERVICAL REGION: ICD-10-CM

## 2025-06-13 DIAGNOSIS — Z98.1 S/P CERVICAL SPINAL FUSION: ICD-10-CM

## 2025-06-13 PROCEDURE — 97014 ELECTRIC STIMULATION THERAPY: CPT

## 2025-06-13 PROCEDURE — 97530 THERAPEUTIC ACTIVITIES: CPT

## 2025-06-13 PROCEDURE — 97110 THERAPEUTIC EXERCISES: CPT

## 2025-06-13 PROCEDURE — 97140 MANUAL THERAPY 1/> REGIONS: CPT

## 2025-06-13 NOTE — LETTER
June 13, 2025  Omar Lira MD  [id number]  [facility] [address]  [facility] [address]    To whom it may concern,     The attached plan of care is being sent to you for review and reference.    You may indicate your approval by signing the document electronically, or by faxing/mailing a signed copy of the final page of this document back to the attention of Chandler Castellon, PT:         Plan of Care 6/13/25   Effective from: 6/13/2025  Effective to: 8/11/2025    Plan ID: [id number]            Participants as of Finalize on 6/13/2025    Name Type Comments Contact Info    Omar Lira MD Referring Provider  [phone]    Chandler Castellon, PT Physical Therapist         Last Plan Note     Author: Chandler Castellon PT Status: Addendum Last edited: 6/13/2025  7:30 AM         Outpatient Rehab    Physical Therapy Progress Note : Updated Plan of Care    Patient Name: Rinku Herzog  MRN: [id number]  YOB: 1965  Encounter Date: 6/13/2025    Therapy Diagnosis:   Encounter Diagnoses   Name Primary?    Neck pain Yes    S/P cervical spinal fusion     Spinal stenosis in cervical region      Physician: Omar Lira, *    Physician Orders: Eval and Treat  Medical Diagnosis: Spinal stenosis in cervical region  Surgical Diagnosis: Cervcal Spondylosis with radiculopathy and cervical stenosis   Surgical Date: 1/7/2025    Visit # / Visits Authorized:  38 / 50  Insurance Authorization Period: 3/12/2025 to 3/12/2026  Date of Evaluation: 3/13/2025   Plan of Care Certification: 3/17/2025 to 6/6/2025      Time In: 0659   Time Out: 0811  Total Time (in minutes): 72   Total Billable Time (in minutes): 72    FOTO:  Therapist reviewed FOTO scores for Rinku Herzog on 3/13/2025.      Intake: Patient's Physical FS Primary Measure: 22 (goal is 56 @ visit #14)  Intake: Risk Adjusted Statistical FOTO: 51  Intake: Limitation Score: 78%  Intake: Category: Carrying  Intake: NDI:  78.0% (higher score greater  disability)      3/28/2025: Patient's Physical FS Primary Measure: 54 (goal is 56 @ visit #14)  Intake: Risk Adjusted Statistical FOTO: 51  3/28/2025: Limitation Score: 46%  3/28/2025: Category: Carrying  3/28/2025: NDI:  30.0% (higher score greater disability)      4/25/2025: Patient's Physical FS Primary Measure: 60 (goal is 56 @ visit #14)  Intake: Risk Adjusted Statistical FOTO: 51  4/25/2025:  Limitation Score: 40%  4/25/2025: Category: Carrying  4/25/2025:   NDI:  22.0% (higher score greater disability)      5/09/2025: Patient's Physical FS Primary Measure: 57 (goal is 55@ visit #14)  Intake: Risk Adjusted Statistical FOTO: 51  5/09/2025:  Limitation Score: 43%  5/09/2025: Category: Carrying  5/09/2025:  NDI:  26.0% (higher score greater disability)      Intake Score: 22 (predicted 56 by visit #14)%  Survey Score 2: 54 (predicted 56 by visit #14)%  Survey Score 3: 60 (predicted 56 by visit #14)%  Survey Score 4: 57 (predicted 55 by visit #14)%     Precautions: post neck fusion- restrictions       Subjective   Rinku doing pretty well. Feels he is about 75% better overall. Still getting episodes of right neck and upper trap pain and burning when sitting for extended periods  and tends to be a little worse if uses his arms while sitting. Doesn't feel any significant improvement from the second injection. No better and no worse than the first one. Most likely heading back to work on the 15th of July. Would like to continue therapy until then and have a couple of visits afterwards to address any issues with working..  Pain reported as 0/10.      Objective      Subcranial Range of Motion   Active Restricted? Passive Restricted? Pain   Flexion         Protraction         Retraction           Cervical Range of Motion   Active (deg) Passive (deg) Pain   Flexion 55       Extension 42       Right Lateral Flexion 38       Right Rotation 65       Left Lateral Flexion 35       Left Rotation 78                       Treatment:  Therapeutic Exercise  TE 1: 30 mins; Chin tucks, scapula retractions, scap elevations, front headlifts, open book R, R lateral head lifts, open book L, L lateral head lifts, prone head lifts, prone superman, prone W's, cat/camel, banded rows, banded ext, banded ER, banded IR, horizontal abd, bilateral ER, wall clock, serratus wall slides  Manual Therapy  MT 1: 15 mins; PA's at thoracic spinous processes, DTM at medial scap and right upper trap, cervical isometrics; gentle passive neck stretch, manual traction/distraction  Therapeutic Activity  TA 1: 12 mins; NuStep, serratus wall slides  Modalities  Electrical Stimulation (Parameters): 15 mins; IFC e-stim post ex    Time Entry(in minutes):  E-Stim (Unattended) Time Entry: 15  Manual Therapy Time Entry: 15  Therapeutic Activity Time Entry: 12  Therapeutic Exercise Time Entry: 30    Assessment & Plan        The patient will continue to benefit from skilled outpatient physical therapy in order to address the deficits listed in the problem list on the initial evaluation, provide patient and family education, and maximize the patients level of independence in the home and community environments.     The patient's spiritual, cultural, and educational needs were considered, and the patient is agreeable to the plan of care and goals.           Goals:   Active       LTG       Patient will report at least 20% disability reduction from initial NDI score to indicate clinically significant functional improvement   (Met)       Start:  03/16/25    Expected End:  06/06/25    Resolved:  05/09/25         Patient will report at least 30 point increase on initial FOTO score to indicate clinically significant functional improvement   (Progressing)       Start:  03/16/25    Expected End:  08/11/25            Patient will report 80% overall perceived improvement  (Progressing)       Start:  03/16/25    Expected End:  06/06/25            Patient will demonstrate  independence and compliance with final HEP  (Progressing)       Start:  03/16/25    Expected End:  06/06/25              Resolved       STG       Patient will report at least 10% disability reduction from initial NDI score to indicate clinically significant functional improvement   (Met)       Start:  03/16/25    Expected End:  04/25/25    Resolved:  03/31/25         Patient will report at least 10 pt increase on initial FOTO score to indicate clinically significant functional improvement   (Met)       Start:  03/16/25    Expected End:  04/25/25    Resolved:  03/31/25         Patient will report 50% overall perceived improvement  (Met)       Start:  03/16/25    Expected End:  04/25/25    Resolved:  03/31/25         Patient will demonstrate independence and compliance with HEP  (Met)       Start:  03/16/25    Expected End:  04/25/25    Resolved:  03/31/25             Chandler Castellon PT           Current Participants as of 6/13/2025    Name Type Comments Contact Info    Omar Lira MD Referring Provider  [phone]    Signature pending    Chandler Castellon PT Physical Therapist                  Sincerely,      Chandler Castellon PT  [facility]                                                            Dear Chandler Castellon PT,    RE: Mr. Rinku Herzog, MRN: [id number]    I certify that I have reviewed the attached plan of care and agree to the details within.        ___________________________  ___________________________  Provider Printed Name   Provider Signed Name      ___________________________  Date and Time

## 2025-06-13 NOTE — PROGRESS NOTES
Outpatient Rehab    Physical Therapy Progress Note : Updated Plan of Care    Patient Name: Rinku Herzog  MRN: [id number]  YOB: 1965  Encounter Date: 6/13/2025    Therapy Diagnosis:   Encounter Diagnoses   Name Primary?    Neck pain Yes    S/P cervical spinal fusion     Spinal stenosis in cervical region      Physician: Omar Lira, *    Physician Orders: Eval and Treat  Medical Diagnosis: Spinal stenosis in cervical region  Surgical Diagnosis: Cervcal Spondylosis with radiculopathy and cervical stenosis   Surgical Date: 1/7/2025    Visit # / Visits Authorized:  38 / 50  Insurance Authorization Period: 3/12/2025 to 3/12/2026  Date of Evaluation: 3/13/2025   Plan of Care Certification: 3/17/2025 to 6/6/2025      Time In: 0659   Time Out: 0811  Total Time (in minutes): 72   Total Billable Time (in minutes): 72    FOTO:  Therapist reviewed FOTO scores for Rinku Herzog on 3/13/2025.      Intake: Patient's Physical FS Primary Measure: 22 (goal is 56 @ visit #14)  Intake: Risk Adjusted Statistical FOTO: 51  Intake: Limitation Score: 78%  Intake: Category: Carrying  Intake: NDI:  78.0% (higher score greater disability)      3/28/2025: Patient's Physical FS Primary Measure: 54 (goal is 56 @ visit #14)  Intake: Risk Adjusted Statistical FOTO: 51  3/28/2025: Limitation Score: 46%  3/28/2025: Category: Carrying  3/28/2025: NDI:  30.0% (higher score greater disability)      4/25/2025: Patient's Physical FS Primary Measure: 60 (goal is 56 @ visit #14)  Intake: Risk Adjusted Statistical FOTO: 51  4/25/2025:  Limitation Score: 40%  4/25/2025: Category: Carrying  4/25/2025:   NDI:  22.0% (higher score greater disability)      5/09/2025: Patient's Physical FS Primary Measure: 57 (goal is 55@ visit #14)  Intake: Risk Adjusted Statistical FOTO: 51  5/09/2025:  Limitation Score: 43%  5/09/2025: Category: Carrying  5/09/2025:  NDI:  26.0% (higher score greater disability)      Intake Score: 22 (predicted 56 by  visit #14)%  Survey Score 2: 54 (predicted 56 by visit #14)%  Survey Score 3: 60 (predicted 56 by visit #14)%  Survey Score 4: 57 (predicted 55 by visit #14)%     Precautions: post neck fusion- restrictions       Subjective   Rinku doing pretty well. Feels he is about 75% better overall. Still getting episodes of right neck and upper trap pain and burning when sitting for extended periods  and tends to be a little worse if uses his arms while sitting. Doesn't feel any significant improvement from the second injection. No better and no worse than the first one. Most likely heading back to work on the 15th of July. Would like to continue therapy until then and have a couple of visits afterwards to address any issues with working..  Pain reported as 0/10.      Objective      Subcranial Range of Motion   Active Restricted? Passive Restricted? Pain   Flexion         Protraction         Retraction           Cervical Range of Motion   Active (deg) Passive (deg) Pain   Flexion 55       Extension 42       Right Lateral Flexion 38       Right Rotation 65       Left Lateral Flexion 35       Left Rotation 78                      Treatment:  Therapeutic Exercise  TE 1: 30 mins; Chin tucks, scapula retractions, scap elevations, front headlifts, open book R, R lateral head lifts, open book L, L lateral head lifts, prone head lifts, prone superman, prone W's, cat/camel, banded rows, banded ext, banded ER, banded IR, horizontal abd, bilateral ER, wall clock, serratus wall slides  Manual Therapy  MT 1: 15 mins; PA's at thoracic spinous processes, DTM at medial scap and right upper trap, cervical isometrics; gentle passive neck stretch, manual traction/distraction  Therapeutic Activity  TA 1: 12 mins; NuStep, serratus wall slides  Modalities  Electrical Stimulation (Parameters): 15 mins; IFC e-stim post ex    Time Entry(in minutes):  E-Stim (Unattended) Time Entry: 15  Manual Therapy Time Entry: 15  Therapeutic Activity Time Entry:  12  Therapeutic Exercise Time Entry: 30    Assessment & Plan        The patient will continue to benefit from skilled outpatient physical therapy in order to address the deficits listed in the problem list on the initial evaluation, provide patient and family education, and maximize the patients level of independence in the home and community environments.     The patient's spiritual, cultural, and educational needs were considered, and the patient is agreeable to the plan of care and goals.           Goals:   Active       LTG       Patient will report at least 20% disability reduction from initial NDI score to indicate clinically significant functional improvement   (Met)       Start:  03/16/25    Expected End:  06/06/25    Resolved:  05/09/25         Patient will report at least 30 point increase on initial FOTO score to indicate clinically significant functional improvement   (Progressing)       Start:  03/16/25    Expected End:  08/11/25            Patient will report 80% overall perceived improvement  (Progressing)       Start:  03/16/25    Expected End:  06/06/25            Patient will demonstrate independence and compliance with final HEP  (Progressing)       Start:  03/16/25    Expected End:  06/06/25              Resolved       STG       Patient will report at least 10% disability reduction from initial NDI score to indicate clinically significant functional improvement   (Met)       Start:  03/16/25    Expected End:  04/25/25    Resolved:  03/31/25         Patient will report at least 10 pt increase on initial FOTO score to indicate clinically significant functional improvement   (Met)       Start:  03/16/25    Expected End:  04/25/25    Resolved:  03/31/25         Patient will report 50% overall perceived improvement  (Met)       Start:  03/16/25    Expected End:  04/25/25    Resolved:  03/31/25         Patient will demonstrate independence and compliance with HEP  (Met)       Start:  03/16/25     Expected End:  04/25/25    Resolved:  03/31/25             Chandler Castellon PT

## 2025-06-20 ENCOUNTER — CLINICAL SUPPORT (OUTPATIENT)
Dept: REHABILITATION | Facility: HOSPITAL | Age: 60
End: 2025-06-20
Payer: COMMERCIAL

## 2025-06-20 DIAGNOSIS — Z98.1 S/P CERVICAL SPINAL FUSION: ICD-10-CM

## 2025-06-20 DIAGNOSIS — M48.02 SPINAL STENOSIS IN CERVICAL REGION: ICD-10-CM

## 2025-06-20 DIAGNOSIS — M54.2 NECK PAIN: Primary | ICD-10-CM

## 2025-06-20 PROCEDURE — 97110 THERAPEUTIC EXERCISES: CPT

## 2025-06-20 PROCEDURE — 97014 ELECTRIC STIMULATION THERAPY: CPT

## 2025-06-20 PROCEDURE — 97140 MANUAL THERAPY 1/> REGIONS: CPT

## 2025-06-20 PROCEDURE — 97530 THERAPEUTIC ACTIVITIES: CPT

## 2025-06-20 NOTE — PROGRESS NOTES
Outpatient Rehab    Physical Therapy Visit    Patient Name: Rinku Herzog  MRN: [id number]  YOB: 1965  Encounter Date: 6/20/2025    Therapy Diagnosis:   Encounter Diagnoses   Name Primary?    Neck pain Yes    S/P cervical spinal fusion     Spinal stenosis in cervical region      Physician: Omar Lira, *    Physician Orders: Eval and Treat  Medical Diagnosis: Spinal stenosis in cervical region  Surgical Diagnosis: Cervcal Spondylosis with radiculopathy and cervical stenosis   Surgical Date: 1/7/2025  Days Since Last Surgery: 164    Visit # / Visits Authorized:  40 / 50  Insurance Authorization Period: 3/12/2025 to 3/12/2026  Date of Evaluation: 3/13/2025  Plan of Care Certification: 6/13/2025 to 8/11/2025        Time In: 0650   Time Out: 0802  Total Time (in minutes): 72   Total Billable Time (in minutes): 72    FOTO:  Therapist reviewed FOTO scores for Rinku Herzog on 3/13/2025.      Intake: Patient's Physical FS Primary Measure: 22 (goal is 56 @ visit #14)  Intake: Risk Adjusted Statistical FOTO: 51  Intake: Limitation Score: 78%  Intake: Category: Carrying  Intake: NDI:  78.0% (higher score greater disability)      3/28/2025: Patient's Physical FS Primary Measure: 54 (goal is 56 @ visit #14)  Intake: Risk Adjusted Statistical FOTO: 51  3/28/2025: Limitation Score: 46%  3/28/2025: Category: Carrying  3/28/2025: NDI:  30.0% (higher score greater disability)      4/25/2025: Patient's Physical FS Primary Measure: 60 (goal is 56 @ visit #14)  Intake: Risk Adjusted Statistical FOTO: 51  4/25/2025:  Limitation Score: 40%  4/25/2025: Category: Carrying  4/25/2025:   NDI:  22.0% (higher score greater disability)      5/09/2025: Patient's Physical FS Primary Measure: 57 (goal is 55@ visit #14)  Intake: Risk Adjusted Statistical FOTO: 51  5/09/2025:  Limitation Score: 43%  5/09/2025: Category: Carrying  5/09/2025:  NDI:  26.0% (higher score greater disability)      Intake Score: 22 (predicted 56  by visit #14)%  Survey Score 2: 54 (predicted 56 by visit #14)%  Survey Score 3: 60 (predicted 56 by visit #14)%  Survey Score 4: 57 (predicted 55 by visit #14)%     Precautions: post neck fusion- restrictions          Subjective   Rinku reports that the neck is doing pretty well today but having issues with sciatica. States that it woke him up last night. Has been doing some stretches for it..  Pain reported as 0/10.        Treatment:  Therapeutic Exercise  TE 1: 30 mins; Chin tucks, scapula retractions, scap elevations, front headlifts, open book R, R lateral head lifts, open book L, L lateral head lifts, prone head lifts, prone superman, prone W's, cat/camel, banded rows, banded ext, banded ER, banded IR, horizontal abd, bilateral ER, wall clock, serratus wall slides  Manual Therapy  MT 1: 15 mins; PA's at thoracic spinous processes, DTM at medial scap and right upper trap, cervical isometrics; gentle passive neck stretch, manual traction/distraction  Therapeutic Activity  TA 1: 12 mins; NuStep, serratus wall slides  Modalities  Electrical Stimulation (Parameters): 15 mins; IFC e-stim post ex    Time Entry(in minutes):  E-Stim (Unattended) Time Entry: 15  Manual Therapy Time Entry: 15  Therapeutic Activity Time Entry: 12  Therapeutic Exercise Time Entry: 30    Assessment & Plan   Assessment: Rinku is doing well. Getting good relief and responding to therapy. Continues to have right neck and shloulder buring pain if sits to long while using his arms but does feel this is improving a little bit. Able to perform tasks and jobs around his house without adverse reactions. CROM remains pretty good with rigtht rotation his most limited but there is good  improvement. Mild upper trap tightness/ trigger pointts. There continues to be improved thoracic spine mobility with thoracic PA's. Heading towards  all goals.  Evaluation/Treatment Tolerance: Patient tolerated treatment well    The patient will continue to benefit from  skilled outpatient physical therapy in order to address the deficits listed in the problem list on the initial evaluation, provide patient and family education, and maximize the patients level of independence in the home and community environments.     The patient's spiritual, cultural, and educational needs were considered, and the patient is agreeable to the plan of care and goals.           Plan: Update the POC for continuation and progress as indicated toward set goals. Will continue 2x week for 8 weeks    Goals:   Active       LTG       Patient will report at least 20% disability reduction from initial NDI score to indicate clinically significant functional improvement   (Met)       Start:  03/16/25    Expected End:  06/06/25    Resolved:  05/09/25         Patient will report at least 30 point increase on initial FOTO score to indicate clinically significant functional improvement   (Progressing)       Start:  03/16/25    Expected End:  08/11/25            Patient will report 80% overall perceived improvement  (Progressing)       Start:  03/16/25    Expected End:  06/06/25            Patient will demonstrate independence and compliance with final HEP  (Progressing)       Start:  03/16/25    Expected End:  06/06/25              Resolved       STG       Patient will report at least 10% disability reduction from initial NDI score to indicate clinically significant functional improvement   (Met)       Start:  03/16/25    Expected End:  04/25/25    Resolved:  03/31/25         Patient will report at least 10 pt increase on initial FOTO score to indicate clinically significant functional improvement   (Met)       Start:  03/16/25    Expected End:  04/25/25    Resolved:  03/31/25         Patient will report 50% overall perceived improvement  (Met)       Start:  03/16/25    Expected End:  04/25/25    Resolved:  03/31/25         Patient will demonstrate independence and compliance with HEP  (Met)       Start:  03/16/25     Expected End:  04/25/25    Resolved:  03/31/25             Chandler Castellon PT

## 2025-06-24 ENCOUNTER — LAB VISIT (OUTPATIENT)
Dept: LAB | Facility: HOSPITAL | Age: 60
End: 2025-06-24
Attending: FAMILY MEDICINE
Payer: COMMERCIAL

## 2025-06-24 ENCOUNTER — CLINICAL SUPPORT (OUTPATIENT)
Dept: REHABILITATION | Facility: HOSPITAL | Age: 60
End: 2025-06-24
Payer: COMMERCIAL

## 2025-06-24 DIAGNOSIS — Z12.5 SCREENING FOR MALIGNANT NEOPLASM OF PROSTATE: ICD-10-CM

## 2025-06-24 DIAGNOSIS — Z98.1 S/P CERVICAL SPINAL FUSION: ICD-10-CM

## 2025-06-24 DIAGNOSIS — M48.02 SPINAL STENOSIS IN CERVICAL REGION: ICD-10-CM

## 2025-06-24 DIAGNOSIS — Z00.00 WELLNESS EXAMINATION: ICD-10-CM

## 2025-06-24 DIAGNOSIS — M54.2 NECK PAIN: Primary | ICD-10-CM

## 2025-06-24 DIAGNOSIS — Z00.00 WELLNESS EXAMINATION: Primary | ICD-10-CM

## 2025-06-24 LAB
ALBUMIN SERPL-MCNC: 4.1 G/DL (ref 3.5–5)
ALBUMIN/GLOB SERPL: 1.4 RATIO (ref 1.1–2)
ALP SERPL-CCNC: 74 UNIT/L (ref 40–150)
ALT SERPL-CCNC: 48 UNIT/L (ref 0–55)
ANION GAP SERPL CALC-SCNC: 7 MEQ/L
AST SERPL-CCNC: 25 UNIT/L (ref 11–45)
BASOPHILS # BLD AUTO: 0.06 X10(3)/MCL
BASOPHILS NFR BLD AUTO: 0.9 %
BILIRUB SERPL-MCNC: 0.7 MG/DL
BUN SERPL-MCNC: 28 MG/DL (ref 8.4–25.7)
CALCIUM SERPL-MCNC: 9.6 MG/DL (ref 8.4–10.2)
CHLORIDE SERPL-SCNC: 110 MMOL/L (ref 98–107)
CHOLEST SERPL-MCNC: 150 MG/DL
CHOLEST/HDLC SERPL: 5 {RATIO} (ref 0–5)
CO2 SERPL-SCNC: 25 MMOL/L (ref 22–29)
CREAT SERPL-MCNC: 1.14 MG/DL (ref 0.72–1.25)
CREAT/UREA NIT SERPL: 25
EOSINOPHIL # BLD AUTO: 0.08 X10(3)/MCL (ref 0–0.9)
EOSINOPHIL NFR BLD AUTO: 1.2 %
ERYTHROCYTE [DISTWIDTH] IN BLOOD BY AUTOMATED COUNT: 14.4 % (ref 11.5–17)
GFR SERPLBLD CREATININE-BSD FMLA CKD-EPI: >60 ML/MIN/1.73/M2
GLOBULIN SER-MCNC: 3 GM/DL (ref 2.4–3.5)
GLUCOSE SERPL-MCNC: 96 MG/DL (ref 74–100)
HCT VFR BLD AUTO: 54.6 % (ref 42–52)
HDLC SERPL-MCNC: 30 MG/DL (ref 35–60)
HGB BLD-MCNC: 18.5 G/DL (ref 14–18)
IMM GRANULOCYTES # BLD AUTO: 0.08 X10(3)/MCL (ref 0–0.04)
IMM GRANULOCYTES NFR BLD AUTO: 1.2 %
LDLC SERPL CALC-MCNC: 97 MG/DL (ref 50–140)
LYMPHOCYTES # BLD AUTO: 1.41 X10(3)/MCL (ref 0.6–4.6)
LYMPHOCYTES NFR BLD AUTO: 21.5 %
MCH RBC QN AUTO: 32.1 PG (ref 27–31)
MCHC RBC AUTO-ENTMCNC: 33.9 G/DL (ref 33–36)
MCV RBC AUTO: 94.6 FL (ref 80–94)
MONOCYTES # BLD AUTO: 0.56 X10(3)/MCL (ref 0.1–1.3)
MONOCYTES NFR BLD AUTO: 8.5 %
NEUTROPHILS # BLD AUTO: 4.36 X10(3)/MCL (ref 2.1–9.2)
NEUTROPHILS NFR BLD AUTO: 66.7 %
NRBC BLD AUTO-RTO: 0 %
PLATELET # BLD AUTO: 158 X10(3)/MCL (ref 130–400)
PMV BLD AUTO: 10.6 FL (ref 7.4–10.4)
POTASSIUM SERPL-SCNC: 4.5 MMOL/L (ref 3.5–5.1)
PROT SERPL-MCNC: 7.1 GM/DL (ref 6.4–8.3)
PSA SERPL-MCNC: 2.89 NG/ML
RBC # BLD AUTO: 5.77 X10(6)/MCL (ref 4.7–6.1)
SODIUM SERPL-SCNC: 142 MMOL/L (ref 136–145)
TRIGL SERPL-MCNC: 116 MG/DL (ref 34–140)
VLDLC SERPL CALC-MCNC: 23 MG/DL
WBC # BLD AUTO: 6.55 X10(3)/MCL (ref 4.5–11.5)

## 2025-06-24 PROCEDURE — 85025 COMPLETE CBC W/AUTO DIFF WBC: CPT

## 2025-06-24 PROCEDURE — 97140 MANUAL THERAPY 1/> REGIONS: CPT

## 2025-06-24 PROCEDURE — 80061 LIPID PANEL: CPT

## 2025-06-24 PROCEDURE — 84153 ASSAY OF PSA TOTAL: CPT

## 2025-06-24 PROCEDURE — 97530 THERAPEUTIC ACTIVITIES: CPT

## 2025-06-24 PROCEDURE — 97110 THERAPEUTIC EXERCISES: CPT

## 2025-06-24 PROCEDURE — 97014 ELECTRIC STIMULATION THERAPY: CPT

## 2025-06-24 PROCEDURE — 36415 COLL VENOUS BLD VENIPUNCTURE: CPT

## 2025-06-24 PROCEDURE — 80053 COMPREHEN METABOLIC PANEL: CPT

## 2025-06-24 NOTE — PROGRESS NOTES
Outpatient Rehab    Physical Therapy Visit    Patient Name: Rinku Herzog  MRN: [id number]  YOB: 1965  Encounter Date: 6/24/2025    Therapy Diagnosis:   Encounter Diagnoses   Name Primary?    Neck pain Yes    S/P cervical spinal fusion     Spinal stenosis in cervical region      Physician: Omar Lira, *    Physician Orders: Eval and Treat  Medical Diagnosis: Spinal stenosis in cervical region  Surgical Diagnosis: Cervcal Spondylosis with radiculopathy and cervical stenosis   Surgical Date: 1/7/2025  Days Since Last Surgery: 168    Visit # / Visits Authorized:  41 / 50  Insurance Authorization Period: 3/12/2025 to 3/12/2026  Date of Evaluation: 3/13/2025  Plan of Care Certification: 6/13/2025 to 8/11/2025        Time In: 0655   Time Out: 0806  Total Time (in minutes): 71   Total Billable Time (in minutes): 71    FOTO:  Therapist reviewed FOTO scores for Rinku Herzog on 3/13/2025.      Intake: Patient's Physical FS Primary Measure: 22 (goal is 56 @ visit #14)  Intake: Risk Adjusted Statistical FOTO: 51  Intake: Limitation Score: 78%  Intake: Category: Carrying  Intake: NDI:  78.0% (higher score greater disability)      3/28/2025: Patient's Physical FS Primary Measure: 54 (goal is 56 @ visit #14)  Intake: Risk Adjusted Statistical FOTO: 51  3/28/2025: Limitation Score: 46%  3/28/2025: Category: Carrying  3/28/2025: NDI:  30.0% (higher score greater disability)      4/25/2025: Patient's Physical FS Primary Measure: 60 (goal is 56 @ visit #14)  Intake: Risk Adjusted Statistical FOTO: 51  4/25/2025:  Limitation Score: 40%  4/25/2025: Category: Carrying  4/25/2025:   NDI:  22.0% (higher score greater disability)      5/09/2025: Patient's Physical FS Primary Measure: 57 (goal is 55@ visit #14)  Intake: Risk Adjusted Statistical FOTO: 51  5/09/2025:  Limitation Score: 43%  5/09/2025: Category: Carrying  5/09/2025:  NDI:  26.0% (higher score greater disability)      Intake Score: 22 (predicted 56  by visit #14)%  Survey Score 2: 54 (predicted 56 by visit #14)%  Survey Score 3: 60 (predicted 56 by visit #14)%  Survey Score 4: 57 (predicted 55 by visit #14)%     Precautions: post neck fusion- restrictions          Subjective   Rinku reports that the neck has been doing well. He has been trying to test it before going back to work and it has responded well. A bigger issue for his is he has been having some right sided sciatica something that he has had in the past. He has history of previous back surgery..  Pain reported as 0/10. no neck pain      Treatment:  Therapeutic Exercise  TE 1: 29mins; Chin tucks, scapula retractions, scap elevations, front headlifts, open book R, R lateral head lifts, open book L, L lateral head lifts, prone head lifts, prone superman, prone W's, cat/camel, banded rows, banded ext, banded ER, banded IR, horizontal abd, bilateral ER, wall clock, serratus wall slides  Manual Therapy  MT 1: 15 mins; PA's at thoracic spinous processes, DTM at medial scap and right upper trap, cervical isometrics; gentle passive neck stretch, manual traction/distraction  Therapeutic Activity  TA 1: 12 mins; NuStep, serratus wall slides  Modalities  Electrical Stimulation (Parameters): 15 mins; IFC e-stim post ex    Time Entry(in minutes):71  E-Stim (Unattended) Time Entry: 15  Manual Therapy Time Entry: 15  Therapeutic Activity Time Entry: 12  Therapeutic Exercise Time Entry: 29    Assessment & Plan   Assessment:         The patient will continue to benefit from skilled outpatient physical therapy in order to address the deficits listed in the problem list on the initial evaluation, provide patient and family education, and maximize the patients level of independence in the home and community environments.     The patient's spiritual, cultural, and educational needs were considered, and the patient is agreeable to the plan of care and goals.           Plan: Update the POC for continuation and progress as  indicated toward set goals. Will continue 2x week for 8 weeks    Goals:   Active       LTG       Patient will report at least 20% disability reduction from initial NDI score to indicate clinically significant functional improvement   (Met)       Start:  03/16/25    Expected End:  06/06/25    Resolved:  05/09/25         Patient will report at least 30 point increase on initial FOTO score to indicate clinically significant functional improvement   (Progressing)       Start:  03/16/25    Expected End:  08/11/25            Patient will report 80% overall perceived improvement  (Progressing)       Start:  03/16/25    Expected End:  06/06/25            Patient will demonstrate independence and compliance with final HEP  (Progressing)       Start:  03/16/25    Expected End:  06/06/25              Resolved       STG       Patient will report at least 10% disability reduction from initial NDI score to indicate clinically significant functional improvement   (Met)       Start:  03/16/25    Expected End:  04/25/25    Resolved:  03/31/25         Patient will report at least 10 pt increase on initial FOTO score to indicate clinically significant functional improvement   (Met)       Start:  03/16/25    Expected End:  04/25/25    Resolved:  03/31/25         Patient will report 50% overall perceived improvement  (Met)       Start:  03/16/25    Expected End:  04/25/25    Resolved:  03/31/25         Patient will demonstrate independence and compliance with HEP  (Met)       Start:  03/16/25    Expected End:  04/25/25    Resolved:  03/31/25             Chandler Castellon PT

## 2025-06-27 ENCOUNTER — CLINICAL SUPPORT (OUTPATIENT)
Dept: REHABILITATION | Facility: HOSPITAL | Age: 60
End: 2025-06-27
Payer: COMMERCIAL

## 2025-06-27 DIAGNOSIS — M48.02 SPINAL STENOSIS IN CERVICAL REGION: ICD-10-CM

## 2025-06-27 DIAGNOSIS — Z98.1 S/P CERVICAL SPINAL FUSION: ICD-10-CM

## 2025-06-27 DIAGNOSIS — M54.2 NECK PAIN: Primary | ICD-10-CM

## 2025-06-27 PROCEDURE — 97140 MANUAL THERAPY 1/> REGIONS: CPT

## 2025-06-27 PROCEDURE — 97110 THERAPEUTIC EXERCISES: CPT

## 2025-06-27 PROCEDURE — 97014 ELECTRIC STIMULATION THERAPY: CPT

## 2025-06-27 PROCEDURE — 97530 THERAPEUTIC ACTIVITIES: CPT

## 2025-06-30 ENCOUNTER — CLINICAL SUPPORT (OUTPATIENT)
Dept: REHABILITATION | Facility: HOSPITAL | Age: 60
End: 2025-06-30
Payer: COMMERCIAL

## 2025-06-30 DIAGNOSIS — M47.817 LUMBOSACRAL SPONDYLOSIS WITHOUT MYELOPATHY: Primary | ICD-10-CM

## 2025-06-30 DIAGNOSIS — M47.816 SPONDYLOSIS WITHOUT MYELOPATHY OR RADICULOPATHY, LUMBAR REGION: Primary | ICD-10-CM

## 2025-06-30 PROCEDURE — 97012 MECHANICAL TRACTION THERAPY: CPT

## 2025-06-30 PROCEDURE — 97110 THERAPEUTIC EXERCISES: CPT

## 2025-06-30 PROCEDURE — 97162 PT EVAL MOD COMPLEX 30 MIN: CPT

## 2025-06-30 PROCEDURE — 97014 ELECTRIC STIMULATION THERAPY: CPT

## 2025-06-30 NOTE — LETTER
July 1, 2025  Omar Lira MD  [id number]  [facility] [address]  [facility] [address]      To whom it may concern,     The attached plan of care is being sent to you for review and reference.    You may indicate your approval by signing the document electronically, or by faxing/mailing a signed copy of the final page of this document back to the attention of Chandler Castellon, PT:         Plan of Care 6/30/25   Effective from: 6/30/2025  Effective to: 9/8/2025    Plan ID: [id number]            Participants as of Finalize on 7/1/2025    Name Type Comments Contact Info    Omar Lira MD Referring Provider  [phone]    Chandler Castellon PT Physical Therapist         Last Plan Note     Author: Chandler Castellon PT Status: Signed Last edited: 6/30/2025  7:30 AM         Outpatient Rehab    Physical Therapy Evaluation    Patient Name: Rinku Herzog  MRN: [id number]  YOB: 1965  Encounter Date: 6/30/2025    Therapy Diagnosis:   Encounter Diagnosis   Name Primary?    Spondylosis without myelopathy or radiculopathy, lumbar region Yes     Physician: Omar Lira, *    Physician Orders: Eval and Treat  Medical Diagnosis: Lumbosacral spondylosis without myelopathy  Surgical Diagnosis: Not applicable for this Episode   Surgical Date: Not applicable for this Episode  Days Since Last Surgery: Not applicable for this Episode    Visit # / Visits Authorized:  1 / 1  Insurance Authorization Period: 6/30/2025 to 6/30/2026  Date of Evaluation: 6/30/2025  Plan of Care Certification: 6/30/2025 to 9/08/2025     Time In: 0655   Time Out: 0825  Total Time (in minutes): 90   Total Billable Time (in minutes): 90    Intake Outcome Measure for FOTO Survey    Therapist reviewed FOTO scores for Rinku Herzog on 6/30/2025.   FOTO report - see Media section or FOTO account episode details.     Intake Score:  40%    Precautions:none       Subjective   History of Present Illness  Rinku is a 59 y.o. male who reports  to physical therapy with a chief concern of Low back pain with right leg pain and numbness.     The patient reports a medical diagnosis of Lumbar Spondylosis without myelopathy or radiculopathy. The patient has experienced this issue since 06/25/25.           History of Present Condition/Illness: Patient is referred to Physical therapy services for Lumbar spondylosis. For the past month he has been having some low back pain but more right leg pain described as a burning pain that travels down to his foot. There is pain into the big toe. He also is having numbness into the leg. These symptoms are constant but vary in intensity. He reports that for the first it woke him up last night and he had to get out of bed and walk around until it eased. He has a history of previous back surgery approximately 3 years ago consisting of discectomy and laminectomy. He reports that prolong standing or sitting will exacerbate his symptoms and does best moving around.    Activities of Daily Living  Social history was obtained from Patient.    General Prior Level of Function Comments: Independent  General Current Level of Function Comments: modified independent  Patient Roles: Caregiver for pet  Patient Responsibilities: Community mobility, Driving, Home management, Personal ADL, Yard work    Previously independent with activities of daily living? Yes                Previously independent with instrumental activities of daily living? Yes     Currently independent with instrumental activities of daily living? Yes              Pain     Patient reports a current pain level of 4/10. Pain at best is reported as 2/10. Pain at worst is reported as 6/10.   Location: Low back and right leg  Clinical Progression (since onset): Stable  Pain Qualities: Aching, Discomfort, Tenderness, Tightness, Throbbing, Dull, Radiating, Burning  Pain-Relieving Factors: Activity modification, Change in position, Relaxation, Rest, Ice, Heat, Movement    "      Review of Systems  Patient reports: Weight Gain  Patient denies: Bladder Incontinence, Bowel Incontinence, Saddle Numbness, and Diabetes        Treatment History  Treatments  Previously Received Treatments: No  Currently Receiving Treatments: No    Living Arrangements  Living Situation  Housing: Home independently  Living Arrangements: Spouse/significant other  Support Systems: Children, Spouse/significant other    Home Setup  Type of Structure: House  Home Access: Level entry        Employment  Patient reports: Does the patient's condition impact their ability to work?  Employment Status: Employed full-time   Patient with surgical neck restrictions. Has not yet been cleared to return to work. He works offshore as a platform manager.      Past Medical History/Physical Systems Review:   Rinku Herzog  has a past medical history of BPH (benign prostatic hyperplasia), Hypertension, and Liver cyst.    Rinku Herzog  has a past surgical history that includes Spine surgery; Tonsillectomy; Sinus surgery (Bilateral); and Colonoscopy (N/A, 5/9/2024).    Rinku has a current medication list which includes the following prescription(s): azithromycin, diazepam, gabapentin, hydrocodone-acetaminophen, hydroxyzine pamoate, hyoscyamine, lidocaine-prilocaine, lisinopril, methocarbamol, phenazopyridine, tadalafil, and tamsulosin, and the following Facility-Administered Medications: 0.9% nacl.    Review of patient's allergies indicates:   Allergen Reactions    Oxycodone-acetaminophen Anxiety     Other Reaction(s): "out of body experience", Not available        Objective   Posture  Patient presents with a Forward head position. Flat lumbar spine is observed.     Pelvic tilt observed: Posterior              Lower Extremity Sensation  General Lumbar/Lower Extremity Sensation  Intact: Right and Left  Right Lumbar/Lower Extremity Sensation  Intact: Light Touch, Sharp/Dull Discrimination, Static Two Point Discrimination, Dynamic Two " Point Discrimination, Kinesthesia, and Proprioception       Left Lumbar/Lower Extremity Sensation  Intact: Light Touch, Static Two Point Discrimination, Dynamic Two Point Discrimination, Sharp/Dull Discrimination, Kinesthesia, and Proprioception                Right Lower Extremity Reflexes  Patellar, L4: Normal (2+)         Achilles, S1: Normal (2+)         Left Lower Extremity Reflexes  Patellar, L4: Normal (2+)          Achilles, S1: Normal (2+)             Spinal Mobility  Hypomobile: Lumbosacral  Lumbosacral Mobility Details: Patient demonstrate hypomobile lumbar segments at L4-L5-S1 with PA's central and R lateral    Spinal Muscle Palpation     Mild ttp to the right superior gluteal, mild tp to right SI joint, mild tenderness along the paraspinals, no tenderness to ASIS, no hip tenderness at the greater trochanter and TFL, no tenderness at the IT band       WNL's left side    Vertebral Palpation  Vertebral Structures Palpated  Lumbar: Right Transverse Process, Left Transverse Process, Right Facet Joint, Left Facet Joint, and Spinous Process       Mild tenderness of facets, mild tenderness with unilateral PA's lumbar spine at L4-L5-S1        Lumbar Range of Motion   Active (deg) Passive (deg) Pain   Flexion  (mild limits)   Yes   Extension  (mild to moderate limits)   Yes   Right Lateral Flexion  (mild to moderate)   Yes   Right Rotation  (complete)       Left Lateral Flexion  (mild limits)       Left Rotation  (complete)                Hip Range of Motion   Right Hip   Active (deg) Passive (deg) Pain   Flexion 95 98     Extension         ABduction 30 35     ADduction         External Rotation 90/90 45 50     External Rotation Prone         Internal Rotation 90/90 30 35     Internal Rotation Prone             Left Hip   Active (deg) Passive (deg) Pain   Flexion 100 105     Extension         ABduction 35 40     ADduction         External Rotation 90/90 45 50     External Rotation Prone         Internal Rotation  90/90 30 35     Internal Rotation Prone             Tight hamstrings and piriformis muscle groups bilaterally with the right greater than the left        Lumbar Strength   Strength Pain   Flexion 5     Extension 4+ Yes   Right Lateral Flexion 5     Left Lateral Flexion 5     Right Rotation 5     Left Rotation 5                  Hip Strength - Planes of Motion   Right Strength Right Pain Left Strength Left  Pain   Flexion (L2) 5   5     Extension 5   5     ABduction 5   5     ADduction 5   5     Internal Rotation 5   5     External Rotation 5   5         Knee Strength   Right Strength Right Pain Left Strength Left  Pain   Flexion (S2) 5   5     Prone Flexion           Extension (L3) 5   5            Ankle/Foot Strength - Planes of Motion   Right Strength Right Pain Left Strength Left  Pain   Dorsiflexion (L4) 5   5     Plantar Flexion (S1) 5   5     Inversion 5   5     Eversion 5   5     Great Toe Flexion 5   5     Great Toe Extension (L5) 5   5     Lesser Toes Flexion           Lesser Toes Extension                     Gait Analysis  Base of Support: Normal            Gait Analysis Details  No limp and found to be within normal limits         Treatment:  Therapeutic Exercise  TE 1: 15 mins; Pelvic tilts, KTC, LTR, bridge, prone pressups  Manual Therapy  MT 1: 10 mins; hamstring stretch, piriformis stretch, LTR  Modalities  Electrical Stimulation (Parameters): 15 mins; IFC e-stim post ex  Mechanical Traction: 20 mins; lumbar traction @100#/65#, cycle 60 sec/20 sec    Time Entry(in minutes):  PT Evaluation (Moderate) Time Entry: 35  E-Stim (Unattended) Time Entry: 15 (IFC e-stim with moist heat to the low back post ex)  Mechanical Traction Time Entry: 20 (100# 60/20 second cycle)  Therapeutic Exercise Time Entry: 20    Assessment & Plan   Assessment  Rinku presents with a condition of Moderate complexity.   Presentation of Symptoms: Stable  Will Comorbidities Impact Care: No       Functional Limitations: Activity  tolerance, Carrying objects, Driving, Functional mobility, Pain with ADLs/IADLs, Range of motion, Completing work/school activities  Impairments: Pain with functional activity  Personal Factors Affecting Prognosis: Pain    Patient Goal for Therapy (PT): Get rid of pain so he can return to work  Prognosis: Good  Assessment Details: Patient is a 59 y.o. male referred to physical therapy services for Lumbar spondylosis. He presents with pain in the low back, pain numbness and tingling into the right leg down to the foot with big toe involvement, there is decreased lumbar ROM, decreased ROM the right leg compared to the left at the hip, decreased mobility of lumbar vertebra all resulting in decreased functional capacity. Patient will benefit from skilled outpatient Physical Therapy to address the deficits stated above and in the chart below, provide education, and to maximize patient's level of independence    Plan  From a physical therapy perspective, the patient would benefit from: Skilled Rehab Services    Planned therapy interventions include: Therapeutic exercise, Therapeutic activities, Neuromuscular re-education, and Manual therapy.    Planned modalities to include: Mechanical traction, Electrical stimulation - passive/unattended, Contrast bath, Thermotherapy (hot pack), and Ultrasound.        Visit Frequency: 2 times Per Week for 10 Weeks.       This plan was discussed with Patient.   Discussion participants: Agreed Upon Plan of Care             The patient's spiritual, cultural, and educational needs were considered, and the patient is agreeable to the plan of care and goals.     Education  Education was done with Patient. The patient's learning style includes Demonstration, Listening, and Pictures/video. The patient Verbalizes understanding and Demonstrates understanding.                 Goals:   Active       LTG's       Patient will report at least 20% disability reduction from initial MDQ score to indicate  clinically significant functional improvement         Start:  06/30/25    Expected End:  09/08/25            Patient will report at least 20% increase on initial FOTO score to indicate clinically significant functional improvement         Start:  06/30/25    Expected End:  09/08/25            Patient will report 80% overall perceived improvement with pain and function       Start:  06/30/25    Expected End:  09/08/25            Patient will demonstrate independence and compliance with final HEP        Start:  06/30/25    Expected End:  09/08/25               STG's       Patient will report at least 10% disability reduction from initial MDQ score to indicate clinically significant functional improvement         Start:  06/30/25    Expected End:  08/04/25            Patient will report at least 10% increase on initial FOTO score to indicate clinically significant functional improvement         Start:  06/30/25    Expected End:  08/04/25            Patient will report 50% overall perceived improvement         Start:  06/30/25    Expected End:  08/04/25            Patient will demonstrate independence and compliance with HEP        Start:  06/30/25    Expected End:  08/04/25            Patient demonstrate improved lumbar a right lower extremity flexibility to WNL's       Start:  06/30/25    Expected End:  08/04/25                Chandler Castellon PT           Current Participants as of 7/1/2025    Name Type Comments Contact Info    Omar Lira MD Referring Provider  [phone]    Signature pending    Chandler Castellon PT Physical Therapist                  Sincerely,      Chandler Castellon PT  [facility]                                                            Dear Chandler Castellon PT,    RE: Mr. Rinku Herzog, MRN: [id number]    I certify that I have reviewed the attached plan of care and agree to the details within.        ___________________________  ___________________________  Provider Printed Name   Provider  Signed Name      ___________________________  Date and Time

## 2025-06-30 NOTE — PROGRESS NOTES
Outpatient Rehab    Physical Therapy Discharge    Patient Name: Rinku Herzog  MRN: [id number]  YOB: 1965  Encounter Date: 6/27/2025    Therapy Diagnosis:   Encounter Diagnoses   Name Primary?    Neck pain Yes    S/P cervical spinal fusion     Spinal stenosis in cervical region      Physician: Omar Lira, *    Physician Orders: Eval and Treat  Medical Diagnosis: Spinal stenosis in cervical region  Surgical Diagnosis: Cervcal Spondylosis with radiculopathy and cervical stenosis   Surgical Date: 1/7/2025  Days Since Last Surgery: 174    Visit # / Visits Authorized:  42 / 50  Insurance Authorization Period: 3/12/2025 to 3/12/2026  Date of Evaluation: 3/13/2025  Plan of Care Certification: 6/13/2025 to 8/11/2025        Time In: 0658   Time Out: 0800  Total Time (in minutes): 62   Total Billable Time (in minutes): 62     FOTO:  Intake: Patient's Physical FS Primary Measure: 22 (goal is 56 @ visit #14)  Intake: Risk Adjusted Statistical FOTO: 51  Intake: Limitation Score: 78%  Intake: Category: Carrying  Intake: NDI:  78.0% (higher score greater disability)      3/28/2025: Patient's Physical FS Primary Measure: 54 (goal is 56 @ visit #14)  Intake: Risk Adjusted Statistical FOTO: 51  3/28/2025: Limitation Score: 46%  3/28/2025: Category: Carrying  3/28/2025: NDI:  30.0% (higher score greater disability)      4/25/2025: Patient's Physical FS Primary Measure: 60 (goal is 56 @ visit #14)  Intake: Risk Adjusted Statistical FOTO: 51  4/25/2025:  Limitation Score: 40%  4/25/2025: Category: Carrying  4/25/2025:   NDI:  22.0% (higher score greater disability)      5/09/2025: Patient's Physical FS Primary Measure: 57 (goal is 55@ visit #14)  Intake: Risk Adjusted Statistical FOTO: 51  5/09/2025:  Limitation Score: 43%  5/09/2025: Category: Carrying  5/09/2025:  NDI:  26.0% (higher score greater disability)      Intake Score: 22 (predicted 56 by visit #14)%  Survey Score 2: 54 (predicted 56 by visit  #14)%  Survey Score 3: 60 (predicted 56 by visit #14)%  Survey Score 4: 57 (predicted 55 by visit #14)%         Subjective   Rinku reports that the neck has been doing really well and feels he is 90% better. He has been trying to test it before going back to work and it has responded well. A bigger issue for his is he has been having some right sided sciatica something that he has had in the past. He has history of previous back surgery. He would like to discharge from this episode of care and return for care on his back once the referral..  Pain reported as 0/10. no neck pain    Objective      Subcranial Range of Motion   Active Restricted? Passive Restricted? Pain   Flexion         Protraction         Retraction           Cervical Range of Motion   Active (deg) Passive (deg) Pain   Flexion 60       Extension 50       Right Lateral Flexion 40       Right Rotation 68       Left Lateral Flexion 38       Left Rotation 80                   Cervical Strength   Strength Pain   Flexion (C1/C2) 5     Extension 5     Right Lateral Flexion (C3) 5     Left Lateral Flexion (C3) 5     Right Rotation 5     Left Rotation 5         Shoulder Strength - Planes of Motion   Right Strength Right Pain Left Strength Left  Pain   Flexion 5   5     Extension 5   5     ABduction 5   5     ADduction 5   5     Horizontal ABduction 5   5     Horizontal ADduction 5   5     Internal Rotation 0° 5   5     Internal Rotation 90° 5   5     External Rotation 0° 5   5     External Rotation 90° 5   5                      Treatment:  Therapeutic Exercise  TE 1: 20mins; Chin tucks, scapula retractions, scap elevations, front headlifts, open book R, R lateral head lifts, open book L, L lateral head lifts, prone head lifts, prone superman, prone W's, cat/camel, banded rows, banded ext, banded ER, banded IR, horizontal abd, bilateral ER, wall clock, serratus wall slides  Manual Therapy  MT 1: 15 mins; PA's at thoracic spinous processes, DTM at medial scap  and right upper trap, cervical isometrics; gentle passive neck stretch, manual traction/distraction  Therapeutic Activity  TA 1: 12 mins; NuStep, serratus wall slides  Modalities  Electrical Stimulation (Parameters): 15 mins; IFC e-stim post ex    Time Entry(in minutes):62  E-Stim (Unattended) Time Entry: 15  Manual Therapy Time Entry: 15  Therapeutic Activity Time Entry: 12  Therapeutic Exercise Time Entry: 20    Assessment & Plan   Assessment: Rinku is doing really well and has responded to therapy. Feels he is 90% better. No longer having the right neck and shoulder buring pain when sitting to long and using his arms. Able to perform tasks and jobs around his house without adverse reactions. CROM is good throughout with rigtht rotation his most limited but there is good improvement here as well. There is no longer any real upper trap tightness/ trigger points. There is 5/5 cervical and shoulder strength. There is much improved thoracic spine mobility that is without tenderness with central PA's. Rinku has met all goals with will be discharged in favor of home exercise program.  Evaluation/Treatment Tolerance: Patient tolerated treatment well    The patient's spiritual, cultural, and educational needs were considered, and the patient is agreeable to the plan of care and goals.           Plan: Discharge from PT to [facility].    Goals:   Resolved       LTG       Patient will report at least 20% disability reduction from initial NDI score to indicate clinically significant functional improvement   (Met)       Start:  03/16/25    Expected End:  06/06/25    Resolved:  05/09/25         Patient will report at least 30 point increase on initial FOTO score to indicate clinically significant functional improvement   (Met)       Start:  03/16/25    Expected End:  08/11/25    Resolved:  06/30/25         Patient will report 80% overall perceived improvement  (Met)       Start:  03/16/25    Expected End:  06/06/25    Resolved:   06/30/25         Patient will demonstrate independence and compliance with final HEP  (Met)       Start:  03/16/25    Expected End:  06/06/25    Resolved:  06/30/25            STG       Patient will report at least 10% disability reduction from initial NDI score to indicate clinically significant functional improvement   (Met)       Start:  03/16/25    Expected End:  04/25/25    Resolved:  03/31/25         Patient will report at least 10 pt increase on initial FOTO score to indicate clinically significant functional improvement   (Met)       Start:  03/16/25    Expected End:  04/25/25    Resolved:  03/31/25         Patient will report 50% overall perceived improvement  (Met)       Start:  03/16/25    Expected End:  04/25/25    Resolved:  03/31/25         Patient will demonstrate independence and compliance with HEP  (Met)       Start:  03/16/25    Expected End:  04/25/25    Resolved:  03/31/25             Chandler Castellon PT

## 2025-07-01 NOTE — PROGRESS NOTES
Outpatient Rehab    Physical Therapy Evaluation    Patient Name: Rinku Herzog  MRN: [id number]  YOB: 1965  Encounter Date: 6/30/2025    Therapy Diagnosis:   Encounter Diagnosis   Name Primary?    Spondylosis without myelopathy or radiculopathy, lumbar region Yes     Physician: Omar Lira, *    Physician Orders: Eval and Treat  Medical Diagnosis: Lumbosacral spondylosis without myelopathy  Surgical Diagnosis: Not applicable for this Episode   Surgical Date: Not applicable for this Episode  Days Since Last Surgery: Not applicable for this Episode    Visit # / Visits Authorized:  1 / 1  Insurance Authorization Period: 6/30/2025 to 6/30/2026  Date of Evaluation: 6/30/2025  Plan of Care Certification: 6/30/2025 to 9/08/2025     Time In: 0655   Time Out: 0825  Total Time (in minutes): 90   Total Billable Time (in minutes): 90    Intake Outcome Measure for FOTO Survey    Therapist reviewed FOTO scores for Rinku Herzog on 6/30/2025.   FOTO report - see Media section or FOTO account episode details.     Intake Score:  40%    Precautions:none       Subjective   History of Present Illness  Rinku is a 59 y.o. male who reports to physical therapy with a chief concern of Low back pain with right leg pain and numbness.     The patient reports a medical diagnosis of Lumbar Spondylosis without myelopathy or radiculopathy. The patient has experienced this issue since 06/25/25.           History of Present Condition/Illness: Patient is referred to Physical therapy services for Lumbar spondylosis. For the past month he has been having some low back pain but more right leg pain described as a burning pain that travels down to his foot. There is pain into the big toe. He also is having numbness into the leg. These symptoms are constant but vary in intensity. He reports that for the first it woke him up last night and he had to get out of bed and walk around until it eased. He has a history of previous back  surgery approximately 3 years ago consisting of discectomy and laminectomy. He reports that prolong standing or sitting will exacerbate his symptoms and does best moving around.    Activities of Daily Living  Social history was obtained from Patient.    General Prior Level of Function Comments: Independent  General Current Level of Function Comments: modified independent  Patient Roles: Caregiver for pet  Patient Responsibilities: Community mobility, Driving, Home management, Personal ADL, Yard work    Previously independent with activities of daily living? Yes                Previously independent with instrumental activities of daily living? Yes     Currently independent with instrumental activities of daily living? Yes              Pain     Patient reports a current pain level of 4/10. Pain at best is reported as 2/10. Pain at worst is reported as 6/10.   Location: Low back and right leg  Clinical Progression (since onset): Stable  Pain Qualities: Aching, Discomfort, Tenderness, Tightness, Throbbing, Dull, Radiating, Burning  Pain-Relieving Factors: Activity modification, Change in position, Relaxation, Rest, Ice, Heat, Movement         Review of Systems  Patient reports: Weight Gain  Patient denies: Bladder Incontinence, Bowel Incontinence, Saddle Numbness, and Diabetes        Treatment History  Treatments  Previously Received Treatments: No  Currently Receiving Treatments: No    Living Arrangements  Living Situation  Housing: Home independently  Living Arrangements: Spouse/significant other  Support Systems: Children, Spouse/significant other    Home Setup  Type of Structure: House  Home Access: Level entry        Employment  Patient reports: Does the patient's condition impact their ability to work?  Employment Status: Employed full-time   Patient with surgical neck restrictions. Has not yet been cleared to return to work. He works offshore as a platform manager.      Past Medical History/Physical Systems  "Review:   Rinku Herzog  has a past medical history of BPH (benign prostatic hyperplasia), Hypertension, and Liver cyst.    Rinku Herzog  has a past surgical history that includes Spine surgery; Tonsillectomy; Sinus surgery (Bilateral); and Colonoscopy (N/A, 5/9/2024).    Rinku has a current medication list which includes the following prescription(s): azithromycin, diazepam, gabapentin, hydrocodone-acetaminophen, hydroxyzine pamoate, hyoscyamine, lidocaine-prilocaine, lisinopril, methocarbamol, phenazopyridine, tadalafil, and tamsulosin, and the following Facility-Administered Medications: 0.9% nacl.    Review of patient's allergies indicates:   Allergen Reactions    Oxycodone-acetaminophen Anxiety     Other Reaction(s): "out of body experience", Not available        Objective   Posture  Patient presents with a Forward head position. Flat lumbar spine is observed.     Pelvic tilt observed: Posterior              Lower Extremity Sensation  General Lumbar/Lower Extremity Sensation  Intact: Right and Left  Right Lumbar/Lower Extremity Sensation  Intact: Light Touch, Sharp/Dull Discrimination, Static Two Point Discrimination, Dynamic Two Point Discrimination, Kinesthesia, and Proprioception       Left Lumbar/Lower Extremity Sensation  Intact: Light Touch, Static Two Point Discrimination, Dynamic Two Point Discrimination, Sharp/Dull Discrimination, Kinesthesia, and Proprioception                Right Lower Extremity Reflexes  Patellar, L4: Normal (2+)         Achilles, S1: Normal (2+)         Left Lower Extremity Reflexes  Patellar, L4: Normal (2+)          Achilles, S1: Normal (2+)             Spinal Mobility  Hypomobile: Lumbosacral  Lumbosacral Mobility Details: Patient demonstrate hypomobile lumbar segments at L4-L5-S1 with PA's central and R lateral    Spinal Muscle Palpation     Mild ttp to the right superior gluteal, mild tp to right SI joint, mild tenderness along the paraspinals, no tenderness to ASIS, no hip " tenderness at the greater trochanter and TFL, no tenderness at the IT band       WNL's left side    Vertebral Palpation  Vertebral Structures Palpated  Lumbar: Right Transverse Process, Left Transverse Process, Right Facet Joint, Left Facet Joint, and Spinous Process       Mild tenderness of facets, mild tenderness with unilateral PA's lumbar spine at L4-L5-S1        Lumbar Range of Motion   Active (deg) Passive (deg) Pain   Flexion  (mild limits)   Yes   Extension  (mild to moderate limits)   Yes   Right Lateral Flexion  (mild to moderate)   Yes   Right Rotation  (complete)       Left Lateral Flexion  (mild limits)       Left Rotation  (complete)                Hip Range of Motion   Right Hip   Active (deg) Passive (deg) Pain   Flexion 95 98     Extension         ABduction 30 35     ADduction         External Rotation 90/90 45 50     External Rotation Prone         Internal Rotation 90/90 30 35     Internal Rotation Prone             Left Hip   Active (deg) Passive (deg) Pain   Flexion 100 105     Extension         ABduction 35 40     ADduction         External Rotation 90/90 45 50     External Rotation Prone         Internal Rotation 90/90 30 35     Internal Rotation Prone             Tight hamstrings and piriformis muscle groups bilaterally with the right greater than the left        Lumbar Strength   Strength Pain   Flexion 5     Extension 4+ Yes   Right Lateral Flexion 5     Left Lateral Flexion 5     Right Rotation 5     Left Rotation 5                  Hip Strength - Planes of Motion   Right Strength Right Pain Left Strength Left  Pain   Flexion (L2) 5   5     Extension 5   5     ABduction 5   5     ADduction 5   5     Internal Rotation 5   5     External Rotation 5   5         Knee Strength   Right Strength Right Pain Left Strength Left  Pain   Flexion (S2) 5   5     Prone Flexion           Extension (L3) 5   5            Ankle/Foot Strength - Planes of Motion   Right Strength Right Pain Left Strength  Left  Pain   Dorsiflexion (L4) 5   5     Plantar Flexion (S1) 5   5     Inversion 5   5     Eversion 5   5     Great Toe Flexion 5   5     Great Toe Extension (L5) 5   5     Lesser Toes Flexion           Lesser Toes Extension                     Gait Analysis  Base of Support: Normal            Gait Analysis Details  No limp and found to be within normal limits         Treatment:  Therapeutic Exercise  TE 1: 15 mins; Pelvic tilts, KTC, LTR, bridge, prone pressups  Manual Therapy  MT 1: 10 mins; hamstring stretch, piriformis stretch, LTR  Modalities  Electrical Stimulation (Parameters): 15 mins; IFC e-stim post ex  Mechanical Traction: 20 mins; lumbar traction @100#/65#, cycle 60 sec/20 sec    Time Entry(in minutes):  PT Evaluation (Moderate) Time Entry: 35  E-Stim (Unattended) Time Entry: 15 (IFC e-stim with moist heat to the low back post ex)  Mechanical Traction Time Entry: 20 (100# 60/20 second cycle)  Therapeutic Exercise Time Entry: 20    Assessment & Plan   Assessment  Rinku presents with a condition of Moderate complexity.   Presentation of Symptoms: Stable  Will Comorbidities Impact Care: No       Functional Limitations: Activity tolerance, Carrying objects, Driving, Functional mobility, Pain with ADLs/IADLs, Range of motion, Completing work/school activities  Impairments: Pain with functional activity  Personal Factors Affecting Prognosis: Pain    Patient Goal for Therapy (PT): Get rid of pain so he can return to work  Prognosis: Good  Assessment Details: Patient is a 59 y.o. male referred to physical therapy services for Lumbar spondylosis. He presents with pain in the low back, pain numbness and tingling into the right leg down to the foot with big toe involvement, there is decreased lumbar ROM, decreased ROM the right leg compared to the left at the hip, decreased mobility of lumbar vertebra all resulting in decreased functional capacity. Patient will benefit from skilled outpatient Physical Therapy to  address the deficits stated above and in the chart below, provide education, and to maximize patient's level of independence    Plan  From a physical therapy perspective, the patient would benefit from: Skilled Rehab Services    Planned therapy interventions include: Therapeutic exercise, Therapeutic activities, Neuromuscular re-education, and Manual therapy.    Planned modalities to include: Mechanical traction, Electrical stimulation - passive/unattended, Contrast bath, Thermotherapy (hot pack), and Ultrasound.        Visit Frequency: 2 times Per Week for 10 Weeks.       This plan was discussed with Patient.   Discussion participants: Agreed Upon Plan of Care             The patient's spiritual, cultural, and educational needs were considered, and the patient is agreeable to the plan of care and goals.     Education  Education was done with Patient. The patient's learning style includes Demonstration, Listening, and Pictures/video. The patient Verbalizes understanding and Demonstrates understanding.                 Goals:   Active       LTG's       Patient will report at least 20% disability reduction from initial MDQ score to indicate clinically significant functional improvement         Start:  06/30/25    Expected End:  09/08/25            Patient will report at least 20% increase on initial FOTO score to indicate clinically significant functional improvement         Start:  06/30/25    Expected End:  09/08/25            Patient will report 80% overall perceived improvement with pain and function       Start:  06/30/25    Expected End:  09/08/25            Patient will demonstrate independence and compliance with final HEP        Start:  06/30/25    Expected End:  09/08/25               STG's       Patient will report at least 10% disability reduction from initial MDQ score to indicate clinically significant functional improvement         Start:  06/30/25    Expected End:  08/04/25            Patient will  report at least 10% increase on initial FOTO score to indicate clinically significant functional improvement         Start:  06/30/25    Expected End:  08/04/25            Patient will report 50% overall perceived improvement         Start:  06/30/25    Expected End:  08/04/25            Patient will demonstrate independence and compliance with HEP        Start:  06/30/25    Expected End:  08/04/25            Patient demonstrate improved lumbar a right lower extremity flexibility to WNL's       Start:  06/30/25    Expected End:  08/04/25                Chandler Castellon PT

## 2025-07-03 ENCOUNTER — CLINICAL SUPPORT (OUTPATIENT)
Dept: REHABILITATION | Facility: HOSPITAL | Age: 60
End: 2025-07-03
Payer: COMMERCIAL

## 2025-07-03 DIAGNOSIS — M47.816 SPONDYLOSIS WITHOUT MYELOPATHY OR RADICULOPATHY, LUMBAR REGION: Primary | ICD-10-CM

## 2025-07-03 DIAGNOSIS — M47.817 LUMBOSACRAL SPONDYLOSIS WITHOUT MYELOPATHY: ICD-10-CM

## 2025-07-03 PROCEDURE — 97012 MECHANICAL TRACTION THERAPY: CPT

## 2025-07-03 PROCEDURE — 97110 THERAPEUTIC EXERCISES: CPT

## 2025-07-03 PROCEDURE — 97014 ELECTRIC STIMULATION THERAPY: CPT

## 2025-07-03 PROCEDURE — 97140 MANUAL THERAPY 1/> REGIONS: CPT

## 2025-07-03 NOTE — PROGRESS NOTES
Outpatient Rehab    Physical Therapy Visit    Patient Name: Rinku Herzog  MRN: [id number]  YOB: 1965  Encounter Date: 7/3/2025    Therapy Diagnosis:   Encounter Diagnoses   Name Primary?    Spondylosis without myelopathy or radiculopathy, lumbar region Yes    Lumbosacral spondylosis without myelopathy      Physician: Omar Lira, *    Physician Orders: Eval and Treat  Medical Diagnosis: Lumbosacral spondylosis without myelopathy  Surgical Diagnosis: Not applicable for this Episode   Surgical Date: Not applicable for this Episode  Days Since Last Surgery: Not applicable for this Episode    Visit # / Visits Authorized:  1 / 20  Insurance Authorization Period: 7/1/2025 to 9/8/2025  Date of Evaluation: 6/30/2025  Plan of Care Certification: 6/30/2025 to 9/8/2025      PT/PTA:     Number of PTA visits since last PT visit:   Time In: 0650   Time Out: 0800  Total Time (in minutes): 70   Total Billable Time (in minutes): 70    FOTO:  Therapist reviewed FOTO scores for Rinku Herzog on 6/30/2025.   FOTO report - see Media section or FOTO account episode details.      Intake Score:  40%    Precautions:none       Subjective   Rinku reports that there is some improvement in his lumbar and leg symptoms but they do persist. Describes a buring pain behind the knee and calf on the right side.  Pain reported as 3/10.        Treatment:  Therapeutic Exercise  TE 1: 20 mins; Pelvic tilts, KTC, LTR, bridge, swiss ball bridge, prone pressups  Manual Therapy  MT 1: 10 mins; hamstring stretch, piriformis stretch, LTR  Modalities  Electrical Stimulation (Parameters): 20 mins; IFC e-stim post ex  Mechanical Traction: 20 mins; lumbar traction @100#/65#, cycle 60 sec/20 sec    Time Entry(in minutes):  E-Stim (Unattended) Time Entry: 20  Mechanical Traction Time Entry: 20 (100#/65#; 60/20 second cycle)  Manual Therapy Time Entry: 10  Therapeutic Exercise Time Entry: 20    Assessment & Plan   Assessment: Initiated the  plan of care for lumbar spondylosis with radiculopathy and Rinku tolerated the session well.   Evaluation/Treatment Tolerance: Patient tolerated treatment well    The patient will continue to benefit from skilled outpatient physical therapy in order to address the deficits listed in the problem list on the initial evaluation, provide patient and family education, and maximize the patients level of independence in the home and community environments.     The patient's spiritual, cultural, and educational needs were considered, and the patient is agreeable to the plan of care and goals.           Plan: Continue POC and progress as indicated toward set goals.    Goals:   Active       LTG's       Patient will report at least 20% disability reduction from initial MDQ score to indicate clinically significant functional improvement         Start:  06/30/25    Expected End:  09/08/25            Patient will report at least 20% increase on initial FOTO score to indicate clinically significant functional improvement         Start:  06/30/25    Expected End:  09/08/25            Patient will report 80% overall perceived improvement with pain and function       Start:  06/30/25    Expected End:  09/08/25            Patient will demonstrate independence and compliance with final HEP        Start:  06/30/25    Expected End:  09/08/25               STG's       Patient will report at least 10% disability reduction from initial MDQ score to indicate clinically significant functional improvement         Start:  06/30/25    Expected End:  08/04/25            Patient will report at least 10% increase on initial FOTO score to indicate clinically significant functional improvement         Start:  06/30/25    Expected End:  08/04/25            Patient will report 50% overall perceived improvement         Start:  06/30/25    Expected End:  08/04/25            Patient will demonstrate independence and compliance with HEP        Start:   06/30/25    Expected End:  08/04/25            Patient demonstrate improved lumbar a right lower extremity flexibility to WNL's       Start:  06/30/25    Expected End:  08/04/25                Chandler Castellon PT

## 2025-07-07 ENCOUNTER — CLINICAL SUPPORT (OUTPATIENT)
Dept: REHABILITATION | Facility: HOSPITAL | Age: 60
End: 2025-07-07
Payer: COMMERCIAL

## 2025-07-07 DIAGNOSIS — M47.816 SPONDYLOSIS WITHOUT MYELOPATHY OR RADICULOPATHY, LUMBAR REGION: Primary | ICD-10-CM

## 2025-07-07 DIAGNOSIS — M47.817 LUMBOSACRAL SPONDYLOSIS WITHOUT MYELOPATHY: ICD-10-CM

## 2025-07-07 PROCEDURE — 97110 THERAPEUTIC EXERCISES: CPT

## 2025-07-07 PROCEDURE — 97014 ELECTRIC STIMULATION THERAPY: CPT

## 2025-07-07 PROCEDURE — 97140 MANUAL THERAPY 1/> REGIONS: CPT

## 2025-07-07 PROCEDURE — 97012 MECHANICAL TRACTION THERAPY: CPT

## 2025-07-07 NOTE — PROGRESS NOTES
Outpatient Rehab    Physical Therapy Visit    Patient Name: Rinku Herzog  MRN: [id number]  YOB: 1965  Encounter Date: 7/7/2025    Therapy Diagnosis:   Encounter Diagnoses   Name Primary?    Spondylosis without myelopathy or radiculopathy, lumbar region Yes    Lumbosacral spondylosis without myelopathy      Physician: Omar Lira, *    Physician Orders: Eval and Treat  Medical Diagnosis: Lumbosacral spondylosis without myelopathy  Surgical Diagnosis: Not applicable for this Episode   Surgical Date: Not applicable for this Episode  Days Since Last Surgery: Not applicable for this Episode    Visit # / Visits Authorized:  2 / 20  Insurance Authorization Period: 7/1/2025 to 9/8/2025  Date of Evaluation: 6/30/2025  Plan of Care Certification: 6/30/2025 to 9/8/2025      Time In: 0650   Time Out: 0800  Total Time (in minutes): 70   Total Billable Time (in minutes): 70    FOTO:  Therapist reviewed FOTO scores for Rinku Herzog on 6/30/2025.   FOTO report - see Media section or FOTO account episode details.      Intake Score:  40%    Precautions:none       Subjective   Rinku reports to be doing ok. States his pain is no better but no worse. Saw his doctor and he will be getting JENNY on Friday..  Pain reported as 3/10.        Treatment:  Therapeutic Exercise  TE 1: 20 mins; Pelvic tilts, KTC, LTR, bridge, swiss ball bridge, prone pressups  Manual Therapy  MT 1: 10 mins; hamstring stretch, piriformis stretch, LTR  Modalities  Electrical Stimulation (Parameters): 20 mins; IFC e-stim post ex  Mechanical Traction: 20 mins; lumbar traction @100#/65#, cycle 60 sec/20 sec    Time Entry(in minutes):70  E-Stim (Unattended) Time Entry: 20  Mechanical Traction Time Entry: 20  Manual Therapy Time Entry: 10  Therapeutic Exercise Time Entry: 20    Assessment & Plan   Assessment: Continued with the plan of care for lumbar spondylosis with radiculopathy and Rinku tolerated the session well. No issues with pelvic  traction. Hamstring are really tight and we are working on stretching.  Evaluation/Treatment Tolerance: Patient tolerated treatment well    The patient will continue to benefit from skilled outpatient physical therapy in order to address the deficits listed in the problem list on the initial evaluation, provide patient and family education, and maximize the patients level of independence in the home and community environments.     The patient's spiritual, cultural, and educational needs were considered, and the patient is agreeable to the plan of care and goals.           Plan: Continue POC and progress as indicated toward set goals.    Goals:   Active       LTG's       Patient will report at least 20% disability reduction from initial MDQ score to indicate clinically significant functional improvement   (Progressing)       Start:  06/30/25    Expected End:  09/08/25            Patient will report at least 20% increase on initial FOTO score to indicate clinically significant functional improvement   (Progressing)       Start:  06/30/25    Expected End:  09/08/25            Patient will report 80% overall perceived improvement with pain and function (Progressing)       Start:  06/30/25    Expected End:  09/08/25            Patient will demonstrate independence and compliance with final HEP  (Progressing)       Start:  06/30/25    Expected End:  09/08/25               STG's       Patient will report at least 10% disability reduction from initial MDQ score to indicate clinically significant functional improvement   (Progressing)       Start:  06/30/25    Expected End:  08/04/25            Patient will report at least 10% increase on initial FOTO score to indicate clinically significant functional improvement   (Progressing)       Start:  06/30/25    Expected End:  08/04/25            Patient will report 50% overall perceived improvement   (Progressing)       Start:  06/30/25    Expected End:  08/04/25             Patient will demonstrate independence and compliance with HEP  (Progressing)       Start:  06/30/25    Expected End:  08/04/25            Patient demonstrate improved lumbar a right lower extremity flexibility to WNL's (Progressing)       Start:  06/30/25    Expected End:  08/04/25                Chandler Castellon PT

## 2025-07-14 ENCOUNTER — CLINICAL SUPPORT (OUTPATIENT)
Dept: REHABILITATION | Facility: HOSPITAL | Age: 60
End: 2025-07-14
Payer: COMMERCIAL

## 2025-07-14 DIAGNOSIS — M47.817 LUMBOSACRAL SPONDYLOSIS WITHOUT MYELOPATHY: ICD-10-CM

## 2025-07-14 DIAGNOSIS — M47.816 SPONDYLOSIS WITHOUT MYELOPATHY OR RADICULOPATHY, LUMBAR REGION: Primary | ICD-10-CM

## 2025-07-14 PROCEDURE — 97530 THERAPEUTIC ACTIVITIES: CPT

## 2025-07-14 PROCEDURE — 97140 MANUAL THERAPY 1/> REGIONS: CPT

## 2025-07-14 PROCEDURE — 97012 MECHANICAL TRACTION THERAPY: CPT

## 2025-07-14 PROCEDURE — 97014 ELECTRIC STIMULATION THERAPY: CPT

## 2025-07-14 PROCEDURE — 97110 THERAPEUTIC EXERCISES: CPT

## 2025-07-14 NOTE — PROGRESS NOTES
Outpatient Rehab    Physical Therapy Visit    Patient Name: Rinku Herzog  MRN: [id number]  YOB: 1965  Encounter Date: 7/14/2025    Therapy Diagnosis:   Encounter Diagnoses   Name Primary?    Spondylosis without myelopathy or radiculopathy, lumbar region Yes    Lumbosacral spondylosis without myelopathy      Physician: Omar Lira, *    Physician Orders: Eval and Treat  Medical Diagnosis: Lumbosacral spondylosis without myelopathy  Surgical Diagnosis: Not applicable for this Episode   Surgical Date: Not applicable for this Episode  Days Since Last Surgery: Not applicable for this Episode    Visit # / Visits Authorized:  3 / 20  Insurance Authorization Period: 7/1/2025 to 9/8/2025  Date of Evaluation: 6/30/2025  Plan of Care Certification: 6/30/2025 to 9/8/2025      Time In: 0650   Time Out: 0800  Total Time (in minutes): 70   Total Billable Time (in minutes): 70    FOTO:  Therapist reviewed FOTO scores for Rinku Herzog on 6/30/2025.   FOTO report - see Media section or FOTO account episode details.      Intake Score:  40%    Precautions:none       Subjective   Rinku had his lumbar injection and reports he had 2 days of no pain at all. Then the pain returned but has been minimal with slight numbness into the calf..  Pain reported as 1/10.        Treatment:  Therapeutic Exercise  TE 1: 20 mins; Pelvic tilts, KTC, LTR, bridge, swiss ball bridge, prone pressups  Manual Therapy  MT 1: 10 mins; hamstring stretch, piriformis stretch, LTR  Therapeutic Activity  TA 1: 10 mins; NuStep  Modalities  Electrical Stimulation (Parameters): 20 mins; IFC e-stim post ex  Mechanical Traction: 20 mins; lumbar traction @100#/65#, cycle 60 sec/20 sec    Time Entry(in minutes):70  E-Stim (Unattended) Time Entry: 20 (IFC e-stim with moist heat to the low back post ex)  Mechanical Traction Time Entry: 20  Manual Therapy Time Entry: 10  Therapeutic Activity Time Entry: 12  Therapeutic Exercise Time Entry:  20    Assessment & Plan   Assessment: Continued with the plan of care for lumbar spondylosis with radiculopathy and Rinku tolerated the session well. Really pushed core stabilization and there were no issues. There was no issues with pelvic traction as well. Hamstrings remains really tight and we continued with the stretching.  Evaluation/Treatment Tolerance: Patient tolerated treatment well    The patient will continue to benefit from skilled outpatient physical therapy in order to address the deficits listed in the problem list on the initial evaluation, provide patient and family education, and maximize the patients level of independence in the home and community environments.     The patient's spiritual, cultural, and educational needs were considered, and the patient is agreeable to the plan of care and goals.           Plan: Continue POC and progress as indicated toward set goals.    Goals:   Active       LTG's       Patient will report at least 20% disability reduction from initial MDQ score to indicate clinically significant functional improvement   (Progressing)       Start:  06/30/25    Expected End:  09/08/25            Patient will report at least 20% increase on initial FOTO score to indicate clinically significant functional improvement   (Progressing)       Start:  06/30/25    Expected End:  09/08/25            Patient will report 80% overall perceived improvement with pain and function (Progressing)       Start:  06/30/25    Expected End:  09/08/25            Patient will demonstrate independence and compliance with final HEP  (Progressing)       Start:  06/30/25    Expected End:  09/08/25               STG's       Patient will report at least 10% disability reduction from initial MDQ score to indicate clinically significant functional improvement   (Progressing)       Start:  06/30/25    Expected End:  08/04/25            Patient will report at least 10% increase on initial FOTO score to indicate  clinically significant functional improvement   (Progressing)       Start:  06/30/25    Expected End:  08/04/25            Patient will report 50% overall perceived improvement   (Progressing)       Start:  06/30/25    Expected End:  08/04/25            Patient will demonstrate independence and compliance with HEP  (Progressing)       Start:  06/30/25    Expected End:  08/04/25            Patient demonstrate improved lumbar a right lower extremity flexibility to WNL's (Progressing)       Start:  06/30/25    Expected End:  08/04/25                Chandler Castellon PT

## 2025-07-17 ENCOUNTER — CLINICAL SUPPORT (OUTPATIENT)
Dept: REHABILITATION | Facility: HOSPITAL | Age: 60
End: 2025-07-17
Payer: COMMERCIAL

## 2025-07-17 DIAGNOSIS — M47.817 LUMBOSACRAL SPONDYLOSIS WITHOUT MYELOPATHY: ICD-10-CM

## 2025-07-17 DIAGNOSIS — M47.816 SPONDYLOSIS WITHOUT MYELOPATHY OR RADICULOPATHY, LUMBAR REGION: Primary | ICD-10-CM

## 2025-07-17 PROCEDURE — 97110 THERAPEUTIC EXERCISES: CPT

## 2025-07-17 PROCEDURE — 97014 ELECTRIC STIMULATION THERAPY: CPT

## 2025-07-17 PROCEDURE — 97530 THERAPEUTIC ACTIVITIES: CPT

## 2025-07-17 PROCEDURE — 97012 MECHANICAL TRACTION THERAPY: CPT

## 2025-07-17 NOTE — PROGRESS NOTES
Outpatient Rehab    Physical Therapy Visit    Patient Name: Rinku Herzog  MRN: [id number]  YOB: 1965  Encounter Date: 7/17/2025    Therapy Diagnosis:   Encounter Diagnoses   Name Primary?    Spondylosis without myelopathy or radiculopathy, lumbar region Yes    Lumbosacral spondylosis without myelopathy      Physician: Omar Lira, *    Physician Orders: Eval and Treat  Medical Diagnosis: Lumbosacral spondylosis without myelopathy  Surgical Diagnosis: Not applicable for this Episode   Surgical Date: Not applicable for this Episode  Days Since Last Surgery: Not applicable for this Episode    Visit # / Visits Authorized:  4 / 20  Insurance Authorization Period: 7/1/2025 to 9/8/2025  Date of Evaluation: 6/30/2025  Plan of Care Certification: 6/30/2025 to 9/8/2025      Time In: 0645   Time Out: 0755  Total Time (in minutes): 70   Total Billable Time (in minutes): 70    FOTO:  Therapist reviewed FOTO scores for Rinku Herzog on 6/30/2025.   FOTO report - see Media section or FOTO account episode details.      Intake Score:  40%    Precautions:none       Subjective   Rinku reports leg pain and numbness but more back pain. Discussed maybe some centralization. 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  Pain reported as 1/10. mild leg pain with back pain      Treatment:  Therapeutic Exercise  TE 1: 20 mins; Pelvic tilts, KTC, LTR, bridge, swiss ball bridge, prone pressups, elbow planks, swiss ball bridge with leg lifts  Therapeutic Activity  TA 1: 10 mins; piriformis stretch, hamstring stretch  Modalities  Moist Heat (min): moist heat to low back  Electrical Stimulation (Parameters): 20 mins; IFC e-stim post ex  Mechanical Traction: 20 mins; lumbar traction @100#/65#, cycle 60 sec/20 sec    Time Entry(in minutes):70  E-Stim (Unattended) Time Entry: 20 (IFC e-stim with moist heat to the low back post ex)  Mechanical Traction Time Entry: 20  Therapeutic Activity Time Entry:  10  Therapeutic Exercise Time Entry: 20    Assessment & Plan   Assessment: Continued with the plan of care for lumbar spondylosis with radiculopathy and Rinku tolerated the session and lumbar traction well. Really pushed core stabilization and there were no issues. Added elbow planks and did well. Hamstrings remains really tight and we continued with the stretching. Less l;eg pain with increased back pain as he may be centralizing.  Evaluation/Treatment Tolerance: Patient tolerated treatment well    The patient will continue to benefit from skilled outpatient physical therapy in order to address the deficits listed in the problem list on the initial evaluation, provide patient and family education, and maximize the patients level of independence in the home and community environments.     The patient's spiritual, cultural, and educational needs were considered, and the patient is agreeable to the plan of care and goals.           Plan: Continue POC and progress as indicated toward set goals. 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    Goals:   Active       LTG's       Patient will report at least 20% disability reduction from initial MDQ score to indicate clinically significant functional improvement   (Progressing)       Start:  06/30/25    Expected End:  09/08/25            Patient will report at least 20% increase on initial FOTO score to indicate clinically significant functional improvement   (Progressing)       Start:  06/30/25    Expected End:  09/08/25            Patient will report 80% overall perceived improvement with pain and function (Progressing)       Start:  06/30/25    Expected End:  09/08/25            Patient will demonstrate independence and compliance with final HEP  (Progressing)       Start:  06/30/25    Expected End:  09/08/25               STG's       Patient will report at least 10% disability reduction from initial MDQ score to indicate clinically significant functional  improvement   (Progressing)       Start:  06/30/25    Expected End:  08/04/25            Patient will report at least 10% increase on initial FOTO score to indicate clinically significant functional improvement   (Progressing)       Start:  06/30/25    Expected End:  08/04/25            Patient will report 50% overall perceived improvement   (Progressing)       Start:  06/30/25    Expected End:  08/04/25            Patient will demonstrate independence and compliance with HEP  (Progressing)       Start:  06/30/25    Expected End:  08/04/25            Patient demonstrate improved lumbar a right lower extremity flexibility to WNL's (Progressing)       Start:  06/30/25    Expected End:  08/04/25                Chandler Castellon PT

## 2025-07-31 ENCOUNTER — CLINICAL SUPPORT (OUTPATIENT)
Dept: REHABILITATION | Facility: HOSPITAL | Age: 60
End: 2025-07-31
Payer: COMMERCIAL

## 2025-07-31 DIAGNOSIS — M47.816 SPONDYLOSIS WITHOUT MYELOPATHY OR RADICULOPATHY, LUMBAR REGION: Primary | ICD-10-CM

## 2025-07-31 DIAGNOSIS — M47.817 LUMBOSACRAL SPONDYLOSIS WITHOUT MYELOPATHY: ICD-10-CM

## 2025-07-31 PROCEDURE — 97110 THERAPEUTIC EXERCISES: CPT

## 2025-07-31 PROCEDURE — 97012 MECHANICAL TRACTION THERAPY: CPT

## 2025-07-31 PROCEDURE — 97140 MANUAL THERAPY 1/> REGIONS: CPT

## 2025-07-31 PROCEDURE — 97014 ELECTRIC STIMULATION THERAPY: CPT

## 2025-07-31 PROCEDURE — 97530 THERAPEUTIC ACTIVITIES: CPT

## 2025-07-31 NOTE — PROGRESS NOTES
Outpatient Rehab    Physical Therapy Visit    Patient Name: Rinku Herzog  MRN: [id number]  YOB: 1965  Encounter Date: 7/31/2025    Therapy Diagnosis:   Encounter Diagnoses   Name Primary?    Spondylosis without myelopathy or radiculopathy, lumbar region Yes    Lumbosacral spondylosis without myelopathy      Physician: Omar Lira, *    Physician Orders: Eval and Treat  Medical Diagnosis: Lumbosacral spondylosis without myelopathy  Surgical Diagnosis: Not applicable for this Episode   Surgical Date: Not applicable for this Episode  Days Since Last Surgery: Not applicable for this Episode    Visit # / Visits Authorized:  5 / 20  Insurance Authorization Period: 7/1/2025 to 9/8/2025  Date of Evaluation: 6/30/2025  Plan of Care Certification: 6/30/2025 to 9/8/2025      Time In: 0648   Time Out: 0758  Total Time (in minutes): 70   Total Billable Time (in minutes): 70    FOTO:  Therapist reviewed FOTO scores for Rinku Herzog on 6/30/2025.   FOTO report - see Media section or FOTO account episode details.      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 The right leg was burning but is doing better today.  Pain reported as 1/10.        Treatment:  Therapeutic Exercise  TE 1: 20 mins; Pelvic tilts, KTC, LTR, bridge, swiss ball bridge, prone pressups, elbow planks, swiss ball bridge with leg lifts  Therapeutic Activity  TA 1: 10 mins; piriformis stretch, hamstring stretch  Modalities  Electrical Stimulation (Parameters): 20 mins; IFC e-stim post ex  Mechanical Traction: 20 mins; lumbar traction @100#/65#, cycle 60 sec/20 sec    Time Entry(in minutes):70  E-Stim (Unattended) Time Entry: 20  Mechanical Traction Time Entry: 20  Manual Therapy Time Entry: 10  Therapeutic Activity Time Entry: 10  Therapeutic Exercise Time Entry: 20    Assessment & Plan   Assessment:  Continued with the plan of care for lumbar spondylosis with radiculopathy and Rinku continues to tolerate the session and lumbar traction well. There are no complaints of back or leg pain during the session. We continue to really push core stabilization. Continued with elbow planks and he did well. Hamstrings remains really tight and we continued with the stretching. Less l;eg pain with increased back pain as he may be centralizing.  Evaluation/Treatment Tolerance: Patient tolerated treatment well    The patient will continue to benefit from skilled outpatient physical therapy in order to address the deficits listed in the problem list on the initial evaluation, provide patient and family education, and maximize the patients level of independence in the home and community environments.     The patient's spiritual, cultural, and educational needs were considered, and the patient is agreeable to the plan of care and goals.           Plan: Continue POC and progress as indicated toward set goals.    Goals:   Active       LTG's       Patient will report at least 20% disability reduction from initial MDQ score to indicate clinically significant functional improvement   (Progressing)       Start:  06/30/25    Expected End:  09/08/25            Patient will report at least 20% increase on initial FOTO score to indicate clinically significant functional improvement   (Progressing)       Start:  06/30/25    Expected End:  09/08/25            Patient will report 80% overall perceived improvement with pain and function (Progressing)       Start:  06/30/25    Expected End:  09/08/25            Patient will demonstrate independence and compliance with final HEP  (Progressing)       Start:  06/30/25    Expected End:  09/08/25               STG's       Patient will report at least 10% disability reduction from initial MDQ score to indicate clinically significant functional improvement   (Progressing)       Start:  06/30/25     Expected End:  08/04/25            Patient will report at least 10% increase on initial FOTO score to indicate clinically significant functional improvement   (Progressing)       Start:  06/30/25    Expected End:  08/04/25            Patient will report 50% overall perceived improvement   (Progressing)       Start:  06/30/25    Expected End:  08/04/25            Patient will demonstrate independence and compliance with HEP  (Progressing)       Start:  06/30/25    Expected End:  08/04/25            Patient demonstrate improved lumbar a right lower extremity flexibility to WNL's (Progressing)       Start:  06/30/25    Expected End:  08/04/25                Chandler Castellon PT

## 2025-08-04 ENCOUNTER — CLINICAL SUPPORT (OUTPATIENT)
Dept: REHABILITATION | Facility: HOSPITAL | Age: 60
End: 2025-08-04
Payer: COMMERCIAL

## 2025-08-04 DIAGNOSIS — M54.2 NECK PAIN: ICD-10-CM

## 2025-08-04 DIAGNOSIS — M47.817 LUMBOSACRAL SPONDYLOSIS WITHOUT MYELOPATHY: ICD-10-CM

## 2025-08-04 DIAGNOSIS — M47.816 SPONDYLOSIS WITHOUT MYELOPATHY OR RADICULOPATHY, LUMBAR REGION: Primary | ICD-10-CM

## 2025-08-04 PROCEDURE — 97530 THERAPEUTIC ACTIVITIES: CPT

## 2025-08-04 PROCEDURE — 97110 THERAPEUTIC EXERCISES: CPT

## 2025-08-04 PROCEDURE — 97012 MECHANICAL TRACTION THERAPY: CPT

## 2025-08-04 PROCEDURE — 97014 ELECTRIC STIMULATION THERAPY: CPT

## 2025-08-04 NOTE — PROGRESS NOTES
"  Outpatient Rehab    Physical Therapy Progress Note    Patient Name: Rinku Herzog  MRN: [id number]  YOB: 1965  Encounter Date: 8/4/2025    Therapy Diagnosis:   Encounter Diagnoses   Name Primary?    Spondylosis without myelopathy or radiculopathy, lumbar region Yes    Lumbosacral spondylosis without myelopathy     Neck pain      Physician: Omar Lira, *    Physician Orders: Eval and Treat  Medical Diagnosis: Lumbosacral spondylosis without myelopathy  Surgical Diagnosis: Not applicable for this Episode   Surgical Date: Not applicable for this Episode  Days Since Last Surgery: Not applicable for this Episode    Visit # / Visits Authorized:  6 / 20  Insurance Authorization Period: 7/1/2025 to 9/8/2025  Date of Evaluation: 6/30/2025  Plan of Care Certification: 6/30/2025 to 9/8/2025   Progress Note Date Range: 6/30/2025 to 8/4/2025     Time In: 0645   Time Out: 0755  Total Time (in minutes): 70   Total Billable Time (in minutes): 70    FOTO:  Therapist reviewed FOTO scores for Rinku Herzog on 6/30/2025.   FOTO report - see Media section or FOTO account episode details.     Intake: Patient's Physical FS Primary Measure: 40 ( 63 predicted  by visit #12 )  Intake: Risk Adjusted Statistical FOTO: 45  Intake: Limitation Score:  55%   Intake: Category: Mobility  Intake: MDQ:   49.2 % disability    8/04/2025: Patient's Physical FS Primary Measure: 53 ( 63 predicted  by visit #12 )  8/04/2025:Risk Adjusted Statistical FOTO: 45  8/04/2025: Limitation Score:  47%   8/04/2025: Category: Mobility  8/04/2025: MDQ:  28.3% disability    Precautions:none       Subjective   Rinku reports intermittent pain continues but is less often. It comes as a burning pain into the right leg. Does best when he stays active. "As long as I am moving there is no pain"..  Pain reported as 2/10.      Objective      Lumbar Range of Motion   Active (deg) Passive (deg) Pain   Flexion  (complete)       Extension  (mild)     "   Right Lateral Flexion  (mild)       Right Rotation  (complete)       Left Lateral Flexion  (mild)       Left Rotation  (complete)                Hip Range of Motion   Right Hip   Active (deg) Passive (deg) Pain   Flexion 100 105     Extension         ABduction 35 40     ADduction         External Rotation 90/90 48 50     External Rotation Prone         Internal Rotation 90/90 32 35     Internal Rotation Prone             Left Hip   Active (deg) Passive (deg) Pain   Flexion 100 105     Extension         ABduction 40 40     ADduction         External Rotation 90/90 50 50     External Rotation Prone         Internal Rotation 90/90 32 35     Internal Rotation Prone                         Treatment:  Therapeutic Exercise  TE 1: 20 mins; Pelvic tilts, KTC, LTR, bridge, swiss ball bridge, prone pressups, elbow planks, swiss ball bridge with leg lifts  Therapeutic Activity  TA 1: 10 mins; piriformis stretch, hamstring stretch  Modalities  Electrical Stimulation (Parameters): 20 mins; IFC e-stim post ex  Mechanical Traction: 20 mins; lumbar traction @100#/65#, cycle 60 sec/20 sec    Time Entry(in minutes):70  E-Stim (Unattended) Time Entry: 20  Mechanical Traction Time Entry: 20  Therapeutic Activity Time Entry: 10  Therapeutic Exercise Time Entry: 20    Assessment & Plan   Assessment: Rinku has completed 6 visits of physical therapy and he is making progress. This is evident by his improved score on the functional outcome tool. He has gone from a 55% limitation to a 47% limitation. He demonstrates improved trunk ROM without any real back with the movements. He does continue to experience intermittent R leg burning pain. Continued with the plan of care for lumbar spondylosis with radiculopathy and Rinku continues to tolerate the sessions and lumbar traction well. There are no complaints of back or leg pain during the sessions. We continue to really push core stabilization. Continued with elbow planks and he did well.  Hamstrings remains really tight and we continued with the stretching. Less leg pain overall and less often.  Evaluation/Treatment Tolerance: Patient tolerated treatment well    The patient will continue to benefit from skilled outpatient physical therapy in order to address the deficits listed in the problem list on the initial evaluation, provide patient and family education, and maximize the patients level of independence in the home and community environments.     The patient's spiritual, cultural, and educational needs were considered, and the patient is agreeable to the plan of care and goals.           Plan: Continue POC and progress as indicated toward set goals.    Goals:   Active       LTG's       Patient will report at least 20% disability reduction from initial MDQ score to indicate clinically significant functional improvement   (Progressing)       Start:  06/30/25    Expected End:  09/08/25            Patient will report at least 20% increase on initial FOTO score to indicate clinically significant functional improvement   (Progressing)       Start:  06/30/25    Expected End:  09/08/25            Patient will report 80% overall perceived improvement with pain and function (Progressing)       Start:  06/30/25    Expected End:  09/08/25            Patient will demonstrate independence and compliance with final HEP  (Progressing)       Start:  06/30/25    Expected End:  09/08/25               STG's       Patient will report at least 10% disability reduction from initial MDQ score to indicate clinically significant functional improvement   (Met)       Start:  06/30/25    Expected End:  08/04/25    Resolved:  08/04/25         Patient will report at least 10% increase on initial FOTO score to indicate clinically significant functional improvement   (Met)       Start:  06/30/25    Expected End:  08/04/25    Resolved:  08/04/25         Patient will report 50% overall perceived improvement   (Progressing)        Start:  06/30/25    Expected End:  08/04/25            Patient will demonstrate independence and compliance with HEP  (Progressing)       Start:  06/30/25    Expected End:  08/04/25            Patient demonstrate improved lumbar a right lower extremity flexibility to WNL's (Progressing)       Start:  06/30/25    Expected End:  08/04/25                Chandler Castellon PT

## 2025-08-08 ENCOUNTER — CLINICAL SUPPORT (OUTPATIENT)
Dept: REHABILITATION | Facility: HOSPITAL | Age: 60
End: 2025-08-08
Payer: COMMERCIAL

## 2025-08-08 DIAGNOSIS — M47.816 SPONDYLOSIS WITHOUT MYELOPATHY OR RADICULOPATHY, LUMBAR REGION: Primary | ICD-10-CM

## 2025-08-08 DIAGNOSIS — M54.2 NECK PAIN: ICD-10-CM

## 2025-08-08 DIAGNOSIS — M47.817 LUMBOSACRAL SPONDYLOSIS WITHOUT MYELOPATHY: ICD-10-CM

## 2025-08-08 PROCEDURE — 97110 THERAPEUTIC EXERCISES: CPT

## 2025-08-08 PROCEDURE — 97530 THERAPEUTIC ACTIVITIES: CPT

## 2025-08-08 PROCEDURE — 97014 ELECTRIC STIMULATION THERAPY: CPT

## 2025-08-08 PROCEDURE — 97012 MECHANICAL TRACTION THERAPY: CPT

## 2025-08-08 NOTE — PROGRESS NOTES
"  Outpatient Rehab    Physical Therapy Visit    Patient Name: Rinku Herzog  MRN: [id number]  YOB: 1965  Encounter Date: 8/8/2025    Therapy Diagnosis:   Encounter Diagnoses   Name Primary?    Spondylosis without myelopathy or radiculopathy, lumbar region Yes    Lumbosacral spondylosis without myelopathy     Neck pain      Physician: Omar Lira, *    Physician Orders: Eval and Treat  Medical Diagnosis: Lumbosacral spondylosis without myelopathy  Surgical Diagnosis: Not applicable for this Episode   Surgical Date: Not applicable for this Episode  Days Since Last Surgery: Not applicable for this Episode    Visit # / Visits Authorized:  7 / 20  Insurance Authorization Period: 7/1/2025 to 9/8/2025  Date of Evaluation: 6/30/2025  Plan of Care Certification: 6/30/2025 to 9/8/2025      Time In: 0645   Time Out: 0755  Total Time (in minutes): 70   Total Billable Time (in minutes): 70    FOTO:  Therapist reviewed FOTO scores for Rinku Herzog on 6/30/2025.   FOTO report - see Media section or FOTO account episode details.      Intake: Patient's Physical FS Primary Measure: 40 ( 63 predicted  by visit #12 )  Intake: Risk Adjusted Statistical FOTO: 45  Intake: Limitation Score:  55%   Intake: Category: Mobility  Intake: MDQ:   49.2 % disability     8/04/2025: Patient's Physical FS Primary Measure: 53 ( 63 predicted  by visit #12 )  8/04/2025:Risk Adjusted Statistical FOTO: 45  8/04/2025: Limitation Score:  47%   8/04/2025: Category: Mobility  8/04/2025: MDQ:  28.3% disability     Precautions:none      Subjective   Rinku reports intermittent pain continues but is less often. "It has its moments but is less". Describes as a burning pain into the right leg. "As long as I am moving there is no pain"..  Pain reported as 0/10.        Treatment:  Therapeutic Exercise  TE 1: 20 mins; Pelvic tilts, KTC, LTR, bridge, swiss ball bridge, prone pressups, elbow planks, swiss ball bridge with leg lifts  Therapeutic " Activity  TA 1: 10 mins; piriformis stretch, hamstring stretch  Modalities  Electrical Stimulation (Parameters): 20 mins; IFC e-stim post ex  Mechanical Traction: 20 mins; lumbar traction @100#/65#, cycle 60 sec/20 sec    Time Entry(in minutes):70  E-Stim (Unattended) Time Entry: 20  Mechanical Traction Time Entry: 20  Therapeutic Activity Time Entry: 10  Therapeutic Exercise Time Entry: 20    Assessment & Plan   Assessment: Continued with the plan of care and Rinku is making progress towards goals. Doing well with the stabilization exercises and lumbar traction. He is having less episodes of pain. Hamstrings are less tight.  Evaluation/Treatment Tolerance: Patient tolerated treatment well    The patient will continue to benefit from skilled outpatient physical therapy in order to address the deficits listed in the problem list on the initial evaluation, provide patient and family education, and maximize the patients level of independence in the home and community environments.     The patient's spiritual, cultural, and educational needs were considered, and the patient is agreeable to the plan of care and goals.           Plan: Continue POC and progress as indicated toward set goals.    Goals:   Active       LTG's       Patient will report at least 20% disability reduction from initial MDQ score to indicate clinically significant functional improvement   (Progressing)       Start:  06/30/25    Expected End:  09/08/25            Patient will report at least 20% increase on initial FOTO score to indicate clinically significant functional improvement   (Progressing)       Start:  06/30/25    Expected End:  09/08/25            Patient will report 80% overall perceived improvement with pain and function (Progressing)       Start:  06/30/25    Expected End:  09/08/25            Patient will demonstrate independence and compliance with final HEP  (Progressing)       Start:  06/30/25    Expected End:  09/08/25               Resolved       STG's       Patient will report at least 10% disability reduction from initial MDQ score to indicate clinically significant functional improvement   (Met)       Start:  06/30/25    Expected End:  08/04/25    Resolved:  08/04/25         Patient will report at least 10% increase on initial FOTO score to indicate clinically significant functional improvement   (Met)       Start:  06/30/25    Expected End:  08/04/25    Resolved:  08/04/25         Patient will report 50% overall perceived improvement   (Met)       Start:  06/30/25    Expected End:  08/04/25    Resolved:  08/08/25         Patient will demonstrate independence and compliance with HEP  (Met)       Start:  06/30/25    Expected End:  08/04/25    Resolved:  08/08/25         Patient demonstrate improved lumbar a right lower extremity flexibility to WNL's (Met)       Start:  06/30/25    Expected End:  08/04/25    Resolved:  08/08/25             Chandler Castellon, PT

## 2025-08-11 ENCOUNTER — CLINICAL SUPPORT (OUTPATIENT)
Dept: REHABILITATION | Facility: HOSPITAL | Age: 60
End: 2025-08-11
Payer: COMMERCIAL

## 2025-08-11 DIAGNOSIS — M47.817 LUMBOSACRAL SPONDYLOSIS WITHOUT MYELOPATHY: ICD-10-CM

## 2025-08-11 DIAGNOSIS — M47.816 SPONDYLOSIS WITHOUT MYELOPATHY OR RADICULOPATHY, LUMBAR REGION: Primary | ICD-10-CM

## 2025-08-11 PROCEDURE — 97110 THERAPEUTIC EXERCISES: CPT

## 2025-08-11 PROCEDURE — 97140 MANUAL THERAPY 1/> REGIONS: CPT

## 2025-08-11 PROCEDURE — 97012 MECHANICAL TRACTION THERAPY: CPT

## 2025-08-11 PROCEDURE — 97530 THERAPEUTIC ACTIVITIES: CPT

## 2025-08-11 PROCEDURE — 97014 ELECTRIC STIMULATION THERAPY: CPT

## 2025-08-27 ENCOUNTER — CLINICAL SUPPORT (OUTPATIENT)
Dept: REHABILITATION | Facility: HOSPITAL | Age: 60
End: 2025-08-27
Payer: COMMERCIAL

## 2025-08-27 DIAGNOSIS — M47.817 LUMBOSACRAL SPONDYLOSIS WITHOUT MYELOPATHY: ICD-10-CM

## 2025-08-27 DIAGNOSIS — M47.816 SPONDYLOSIS WITHOUT MYELOPATHY OR RADICULOPATHY, LUMBAR REGION: Primary | ICD-10-CM

## 2025-08-27 PROCEDURE — 97012 MECHANICAL TRACTION THERAPY: CPT

## 2025-08-27 PROCEDURE — 97014 ELECTRIC STIMULATION THERAPY: CPT

## 2025-08-27 PROCEDURE — 97530 THERAPEUTIC ACTIVITIES: CPT

## 2025-08-27 PROCEDURE — 97110 THERAPEUTIC EXERCISES: CPT

## 2025-09-02 ENCOUNTER — CLINICAL SUPPORT (OUTPATIENT)
Dept: REHABILITATION | Facility: HOSPITAL | Age: 60
End: 2025-09-02
Payer: COMMERCIAL

## 2025-09-02 DIAGNOSIS — M47.816 SPONDYLOSIS WITHOUT MYELOPATHY OR RADICULOPATHY, LUMBAR REGION: Primary | ICD-10-CM

## 2025-09-02 DIAGNOSIS — M47.817 LUMBOSACRAL SPONDYLOSIS WITHOUT MYELOPATHY: ICD-10-CM

## 2025-09-02 PROCEDURE — 97530 THERAPEUTIC ACTIVITIES: CPT

## 2025-09-02 PROCEDURE — 97012 MECHANICAL TRACTION THERAPY: CPT

## 2025-09-02 PROCEDURE — 97110 THERAPEUTIC EXERCISES: CPT

## 2025-09-02 PROCEDURE — 97014 ELECTRIC STIMULATION THERAPY: CPT

## 2025-09-03 ENCOUNTER — OFFICE VISIT (OUTPATIENT)
Dept: FAMILY MEDICINE | Facility: CLINIC | Age: 60
End: 2025-09-03
Payer: COMMERCIAL

## 2025-09-03 VITALS
TEMPERATURE: 97 F | DIASTOLIC BLOOD PRESSURE: 80 MMHG | HEIGHT: 72 IN | RESPIRATION RATE: 18 BRPM | BODY MASS INDEX: 35.21 KG/M2 | HEART RATE: 73 BPM | WEIGHT: 260 LBS | SYSTOLIC BLOOD PRESSURE: 132 MMHG | OXYGEN SATURATION: 95 %

## 2025-09-03 DIAGNOSIS — K76.89 LIVER CYST: ICD-10-CM

## 2025-09-03 DIAGNOSIS — B07.9 WARTS OF FOOT: ICD-10-CM

## 2025-09-03 DIAGNOSIS — Z00.00 WELLNESS EXAMINATION: Primary | ICD-10-CM

## 2025-09-03 RX ORDER — AMOXICILLIN 500 MG/1
500 TABLET, FILM COATED ORAL EVERY 12 HOURS
Qty: 14 TABLET | Refills: 0 | Status: SHIPPED | OUTPATIENT
Start: 2025-09-03 | End: 2025-09-03

## 2025-09-03 RX ORDER — DEXBROMPHENIRAMINE MALEATE 2 MG/1
1 TABLET ORAL EVERY 4 HOURS PRN
Qty: 30 TABLET | Refills: 0 | Status: SHIPPED | OUTPATIENT
Start: 2025-09-03 | End: 2025-09-13

## 2025-09-03 RX ORDER — AMOXICILLIN 500 MG/1
500 TABLET, FILM COATED ORAL EVERY 12 HOURS
Qty: 14 TABLET | Refills: 0 | Status: SHIPPED | OUTPATIENT
Start: 2025-09-03 | End: 2025-09-10

## 2025-09-03 RX ORDER — DEXBROMPHENIRAMINE MALEATE 2 MG/1
1 TABLET ORAL EVERY 4 HOURS PRN
Qty: 30 TABLET | Refills: 0 | Status: SHIPPED | OUTPATIENT
Start: 2025-09-03 | End: 2025-09-03

## 2025-09-04 ENCOUNTER — HOSPITAL ENCOUNTER (OUTPATIENT)
Dept: RADIOLOGY | Facility: HOSPITAL | Age: 60
Discharge: HOME | End: 2025-09-04
Attending: FAMILY MEDICINE
Payer: COMMERCIAL

## 2025-09-04 DIAGNOSIS — K76.89 LIVER CYST: ICD-10-CM

## 2025-09-04 PROCEDURE — 76705 ECHO EXAM OF ABDOMEN: CPT | Mod: TC

## 2025-09-05 ENCOUNTER — CLINICAL SUPPORT (OUTPATIENT)
Dept: REHABILITATION | Facility: HOSPITAL | Age: 60
End: 2025-09-05
Payer: COMMERCIAL

## 2025-09-05 DIAGNOSIS — M47.816 SPONDYLOSIS WITHOUT MYELOPATHY OR RADICULOPATHY, LUMBAR REGION: Primary | ICD-10-CM

## 2025-09-05 DIAGNOSIS — M47.817 LUMBOSACRAL SPONDYLOSIS WITHOUT MYELOPATHY: ICD-10-CM

## 2025-09-05 PROCEDURE — 97530 THERAPEUTIC ACTIVITIES: CPT

## 2025-09-05 PROCEDURE — 97014 ELECTRIC STIMULATION THERAPY: CPT

## 2025-09-05 PROCEDURE — 97012 MECHANICAL TRACTION THERAPY: CPT

## 2025-09-05 PROCEDURE — 97110 THERAPEUTIC EXERCISES: CPT

## (undated) DEVICE — CLIP HEMOSTATIC 2.8MM 235CM

## (undated) DEVICE — SNARE EXACTO COLD

## (undated) DEVICE — TRAPEASE POLYP SINGLE 29-750

## (undated) DEVICE — TUBE WATER AUXILIARY

## (undated) DEVICE — PACK ENDOSCOPY GENERAL

## (undated) DEVICE — SOL IRRI STRL WATER 1000ML